# Patient Record
Sex: MALE | Race: WHITE | NOT HISPANIC OR LATINO | Employment: FULL TIME | URBAN - METROPOLITAN AREA
[De-identification: names, ages, dates, MRNs, and addresses within clinical notes are randomized per-mention and may not be internally consistent; named-entity substitution may affect disease eponyms.]

---

## 2020-11-22 ENCOUNTER — HOSPITAL ENCOUNTER (EMERGENCY)
Facility: HOSPITAL | Age: 35
Discharge: HOME | End: 2020-11-22
Attending: EMERGENCY MEDICINE | Admitting: EMERGENCY MEDICINE

## 2020-11-22 VITALS
RESPIRATION RATE: 16 BRPM | SYSTOLIC BLOOD PRESSURE: 112 MMHG | WEIGHT: 141.31 LBS | TEMPERATURE: 98.3 F | HEIGHT: 70 IN | HEART RATE: 76 BPM | BODY MASS INDEX: 20.23 KG/M2 | DIASTOLIC BLOOD PRESSURE: 73 MMHG | OXYGEN SATURATION: 100 %

## 2020-11-22 DIAGNOSIS — S91.209A AVULSION OF TOENAIL, INITIAL ENCOUNTER: Primary | ICD-10-CM

## 2020-11-22 PROCEDURE — 99283 EMERGENCY DEPT VISIT LOW MDM: CPT

## 2020-11-22 NOTE — DISCHARGE INSTRUCTIONS
Follow-up with podiatry as directed.  Return to the emergency room for any new or worsening symptoms or if you have any other questions or concerns.  Apply antibiotic ointment twice daily until healed.

## 2020-11-22 NOTE — ED NOTES
"Pt reports that he \"pulled\" his toenail off on Rt great toe yesterday and is concerned about the amount of bleeding.  Pt states that it has not stopped.  There is noted to be some surrounding redness to the rt great toe.  Oozing blood noted at the site.       Jordy Dumont, RN  11/22/20 3907    "

## 2020-11-22 NOTE — ED PROVIDER NOTES
"Subjective   Chief complaint: Right great toenail came off    35-year-old male presents stating his right great toenail came off yesterday.  He states he thinks he has a fungus on his toenails and the nail was only partially attached yesterday.  He states he felt like it needed to come off so he just pulled it off.  He states it has been bleeding slightly since that time.  He has had no fever.  He denies any other complaints.      History provided by:  Patient      Review of Systems   Constitutional: Negative for fever.   HENT: Negative for congestion.    Respiratory: Negative for cough and shortness of breath.    Cardiovascular: Negative for chest pain.       No past medical history on file.    No Known Allergies    No past surgical history on file.    No family history on file.    Social History     Socioeconomic History   • Marital status: Single     Spouse name: Not on file   • Number of children: Not on file   • Years of education: Not on file   • Highest education level: Not on file       /73   Pulse 76   Temp 98.3 °F (36.8 °C)   Resp 16   Ht 177.8 cm (70\")   Wt 64.1 kg (141 lb 5 oz)   SpO2 100%   BMI 20.28 kg/m²       Objective   Physical Exam  Vitals signs and nursing note reviewed.   Constitutional:       Appearance: Normal appearance.   HENT:      Head: Normocephalic.   Cardiovascular:      Rate and Rhythm: Normal rate and regular rhythm.   Pulmonary:      Effort: Pulmonary effort is normal. No respiratory distress.   Musculoskeletal:      Comments: Right great toenail has been removed.  Nailbed is somewhat frail-appearing but does not have any active bleeding.  There is no surrounding erythema or warmth.  There is no tenderness on palpation.   Skin:     General: Skin is warm and dry.   Neurological:      Mental Status: He is alert and oriented to person, place, and time.         Procedures           ED Course                                           MDM   Nailbed was cleaned and antibiotic " appointment and dressing were applied.  Patient will be given podiatry follow-up.      Final diagnoses:   Avulsion of toenail, initial encounter            William Beltran MD  11/22/20 4439

## 2020-11-24 ENCOUNTER — OFFICE VISIT (OUTPATIENT)
Dept: PODIATRY | Facility: CLINIC | Age: 35
End: 2020-11-24

## 2020-11-24 VITALS
WEIGHT: 147 LBS | DIASTOLIC BLOOD PRESSURE: 75 MMHG | HEART RATE: 57 BPM | BODY MASS INDEX: 21.05 KG/M2 | SYSTOLIC BLOOD PRESSURE: 117 MMHG | HEIGHT: 70 IN

## 2020-11-24 DIAGNOSIS — L60.1 ONYCHOLYSIS: Primary | ICD-10-CM

## 2020-11-24 DIAGNOSIS — B35.1 ONYCHOMYCOSIS: ICD-10-CM

## 2020-11-24 PROCEDURE — 99203 OFFICE O/P NEW LOW 30 MIN: CPT | Performed by: PODIATRIST

## 2020-11-24 NOTE — PATIENT INSTRUCTIONS
Fungal Nail Infection  A fungal nail infection is a common infection of the toenails or fingernails. This condition affects toenails more often than fingernails. It often affects the great, or big, toes. More than one nail may be infected. The condition can be passed from person to person (is contagious).  What are the causes?  This condition is caused by a fungus. Several types of fungi can cause the infection. These fungi are common in moist and warm areas. If your hands or feet come into contact with the fungus, it may get into a crack in your fingernail or toenail and cause the infection.  What increases the risk?  The following factors may make you more likely to develop this condition:  · Being male.  · Being of older age.  · Living with someone who has the fungus.  · Walking barefoot in areas where the fungus thrives, such as showers or locker rooms.  · Wearing shoes and socks that cause your feet to sweat.  · Having a nail injury or a recent nail surgery.  · Having certain medical conditions, such as:  ? Athlete's foot.  ? Diabetes.  ? Psoriasis.  ? Poor circulation.  ? A weak body defense system (immune system).  What are the signs or symptoms?  Symptoms of this condition include:  · A pale spot on the nail.  · Thickening of the nail.  · A nail that becomes yellow or brown.  · A brittle or ragged nail edge.  · A crumbling nail.  · A nail that has lifted away from the nail bed.  How is this diagnosed?  This condition is diagnosed with a physical exam. Your health care provider may take a scraping or clipping from your nail to test for the fungus.  How is this treated?  Treatment is not needed for mild infections. If you have significant nail changes, treatment may include:  · Antifungal medicines taken by mouth (orally). You may need to take the medicine for several weeks or several months, and you may not see the results for a long time. These medicines can cause side effects. Ask your health care provider  what problems to watch for.  · Antifungal nail polish or nail cream. These may be used along with oral antifungal medicines.  · Laser treatment of the nail.  · Surgery to remove the nail. This may be needed for the most severe infections.  It can take a long time, usually up to a year, for the infection to go away. The infection may also come back.  Follow these instructions at home:  Medicines  · Take or apply over-the-counter and prescription medicines only as told by your health care provider.  · Ask your health care provider about using over-the-counter mentholated ointment on your nails.  Nail care  · Trim your nails often.  · Wash and dry your hands and feet every day.  · Keep your feet dry:  ? Wear absorbent socks, and change your socks frequently.  ? Wear shoes that allow air to circulate, such as sandals or canvas tennis shoes. Throw out old shoes.  · Do not use artificial nails.  · If you go to a nail salon, make sure you choose one that uses clean instruments.  · Use antifungal foot powder on your feet and in your shoes.  General instructions  · Do not share personal items, such as towels or nail clippers.  · Do not walk barefoot in shower rooms or locker rooms.  · Wear rubber gloves if you are working with your hands in wet areas.  · Keep all follow-up visits as told by your health care provider. This is important.  Contact a health care provider if:  Your infection is not getting better or it is getting worse after several months.  Summary  · A fungal nail infection is a common infection of the toenails or fingernails.  · Treatment is not needed for mild infections. If you have significant nail changes, treatment may include taking medicine orally and applying medicine to your nails.  · It can take a long time, usually up to a year, for the infection to go away. The infection may also come back.  · Take or apply over-the-counter and prescription medicines only as told by your health care  provider.  · Follow instructions for taking care of your nails to help prevent infection from coming back or spreading.  This information is not intended to replace advice given to you by your health care provider. Make sure you discuss any questions you have with your health care provider.  Document Revised: 04/09/2020 Document Reviewed: 05/24/2019  Elsevier Patient Education © 2020 [employer]

## 2020-11-24 NOTE — PROGRESS NOTES
11/24/2020  Foot and Ankle Surgery - New Patient   Provider: Dr. Moi Sky DPM  Location: [facility]    Subjective:  Keagan Valenzuela is a 35 y.o. male.     Chief Complaint   Patient presents with   • Right Foot - Nail Problem       HPI: Patient is a 35-year-old male that presents with issues involving his right great toe.  He recently had some loosening to the toenail and remove the nail in its entirety.  He states that this occurred 2 days ago.  He reported to the ED and was referred to me.  Patient has an open wound to the nailbed region but denies any significant pain or limitation.  He has not noticed any deep tunneling wound or signs of infection.  He has had issues of discoloration and thickness involving his nails for several years.  He has tried topical and oral antifungal medications without improvement.  He denies any other pain or issues with his feet.    No Known Allergies    History reviewed. No pertinent past medical history.    History reviewed. No pertinent surgical history.    Family History   Problem Relation Age of Onset   • No Known Problems Mother    • No Known Problems Father        Social History     Socioeconomic History   • Marital status: Single     Spouse name: Not on file   • Number of children: Not on file   • Years of education: Not on file   • Highest education level: Not on file   Tobacco Use   • Smoking status: Current Every Day Smoker     Packs/day: 0.50     Types: Cigarettes   • Smokeless tobacco: Never Used   Substance and Sexual Activity   • Alcohol use: Yes     Comment: very little   • Drug use: Defer   • Sexual activity: Defer        No current outpatient medications on file prior to visit.     No current facility-administered medications on file prior to visit.        Review of Systems:  General: Denies fever, chills, fatigue, and weakness.  Eyes: Denies vision loss, blurry vision, and excessive redness.  ENT: Denies hearing issues and difficulty  "swallowing.  Cardiovascular: Denies palpitations, chest pain, or syncopal episodes.  Respiratory: Denies shortness of breath, wheezing, and coughing.  GI: Denies abdominal pain, nausea, and vomiting.   : Denies frequency, hematuria, and urgency.  Musculoskeletal: Denies muscle cramps, joint pains, and stiffness.  Derm: + Nail issues  Neuro: Denies headaches, numbness, loss of coordination, and tremors.  Psych: Denies anxiety and depression.  Endocrine: Denies temperature intolerance and changes in appetite.  Heme: Denies bleeding disorders or abnormal bruising.     Objective   /75   Pulse 57   Ht 177.8 cm (70\")   Wt 66.7 kg (147 lb)   BMI 21.09 kg/m²     Foot/Ankle Exam:       General:   Appearance: appears stated age and healthy    Orientation: AAOx3    Affect: appropriate    Gait: unimpaired      VASCULAR      Right Foot Vascularity   Normal vascular exam    Dorsalis pedis:  2+  Posterior tibial:  2+  Skin Temperature: warm    Edema Grading:  None  CFT:  < 3 seconds  Pedal Hair Growth:  Present  Varicosities: none       Left Foot Vascularity   Normal vascular exam    Dorsalis pedis:  2+  Posterior tibial:  2+  Skin Temperature: warm    Edema Grading:  None  CFT:  < 3 seconds  Pedal Hair Growth:  Present  Varicosities: none        NEUROLOGIC     Right Foot Neurologic   Light touch sensation:  Normal  Hot/Cold sensation: normal    Achilles reflex:  2+     Left Foot Neurologic   Light touch sensation:  Normal  Hot/cold sensation: normal    Achilles reflex:  2+     MUSCULOSKELETAL      Right Foot Musculoskeletal   Ecchymosis:  None  Tenderness: toe 1    Arch:  Normal     Left Foot Musculoskeletal   Ecchymosis:  None  Tenderness: none    Arch:  Normal     MUSCLE STRENGTH     Right Foot Muscle Strength   Normal strength    Foot dorsiflexion:  5  Foot plantar flexion:  5  Foot inversion:  5  Foot eversion:  5     Left Foot Muscle Strength   Normal strength    Foot dorsiflexion:  5  Foot plantar flexion:  " 5  Foot inversion:  5  Foot eversion:  5     RANGE OF MOTION      Right Foot Range of Motion   Foot and ankle ROM within normal limits       Left Foot Range of Motion   Foot and ankle ROM within normal limits       DERMATOLOGIC     Right Foot Dermatologic   Skin comment:  Open nailbed to the right great toe.  Granular base without signs of infection, debris, or necrosis.  Nails: onychomycosis, abnormally thick and dystrophic nails       Left Foot Dermatologic   Skin: skin intact    Nails: onychomycosis, abnormally thick and dystrophic nails        Right Foot Additional Comments No gross deformity or instability.  Mild discomfort with palpation to the right great toe.      Assessment/Plan   Diagnoses and all orders for this visit:    1. Onycholysis (Primary)    2. Onychomycosis      Patient presents with open nailbed to the right great toe after removing his own nail.  Patient denies any other issues today.  After exam, he does not have any gross signs of infection or other concerns.  I have recommended that he proceed with over-the-counter antibiotic ointment and a Band-Aid.  Patient may shower and bathe as needed.  He may proceed with normal daily activities and monitor closely.  I would like him to call with any progressive signs of infection.  I will see him in 2 to 3 weeks for reevaluation.     No orders of the defined types were placed in this encounter.       Note is dictated utilizing voice recognition software. Unfortunately this leads to occasional typographical errors. I apologize in advance if the situation occurs. If questions occur please do not hesitate to call our office.

## 2022-01-26 ENCOUNTER — TRANSCRIBE ORDERS (OUTPATIENT)
Dept: ADMINISTRATIVE | Facility: HOSPITAL | Age: 37
End: 2022-01-26

## 2022-01-26 ENCOUNTER — LAB (OUTPATIENT)
Dept: LAB | Facility: HOSPITAL | Age: 37
End: 2022-01-26

## 2022-01-26 DIAGNOSIS — Z20.822 EXPOSURE TO COVID-19 VIRUS: ICD-10-CM

## 2022-01-26 DIAGNOSIS — Z20.822 EXPOSURE TO COVID-19 VIRUS: Primary | ICD-10-CM

## 2022-01-26 PROCEDURE — C9803 HOPD COVID-19 SPEC COLLECT: HCPCS

## 2022-01-26 PROCEDURE — U0004 COV-19 TEST NON-CDC HGH THRU: HCPCS

## 2022-01-27 LAB — SARS-COV-2 ORF1AB RESP QL NAA+PROBE: DETECTED

## 2024-04-15 ENCOUNTER — HOSPITAL ENCOUNTER (EMERGENCY)
Facility: HOSPITAL | Age: 39
Discharge: HOME | End: 2024-04-15
Attending: EMERGENCY MEDICINE | Admitting: EMERGENCY MEDICINE
Payer: COMMERCIAL

## 2024-04-15 VITALS
HEART RATE: 68 BPM | HEIGHT: 70 IN | RESPIRATION RATE: 16 BRPM | WEIGHT: 127.87 LBS | BODY MASS INDEX: 18.31 KG/M2 | DIASTOLIC BLOOD PRESSURE: 84 MMHG | TEMPERATURE: 98.7 F | SYSTOLIC BLOOD PRESSURE: 118 MMHG | OXYGEN SATURATION: 97 %

## 2024-04-15 DIAGNOSIS — T44.3X5A: Primary | ICD-10-CM

## 2024-04-15 LAB
ALBUMIN SERPL-MCNC: 4.2 G/DL (ref 3.5–5.2)
ALBUMIN/GLOB SERPL: 1.6 G/DL
ALP SERPL-CCNC: 63 U/L (ref 39–117)
ALT SERPL W P-5'-P-CCNC: 11 U/L (ref 1–41)
AMPHET+METHAMPHET UR QL: NEGATIVE
ANION GAP SERPL CALCULATED.3IONS-SCNC: 11 MMOL/L (ref 5–15)
APAP SERPL-MCNC: <5 MCG/ML (ref 0–30)
AST SERPL-CCNC: 20 U/L (ref 1–40)
BACTERIA UR QL AUTO: ABNORMAL /HPF
BARBITURATES UR QL SCN: NEGATIVE
BASOPHILS # BLD AUTO: 0.01 10*3/MM3 (ref 0–0.2)
BASOPHILS NFR BLD AUTO: 0.1 % (ref 0–1.5)
BENZODIAZ UR QL SCN: NEGATIVE
BILIRUB SERPL-MCNC: 0.6 MG/DL (ref 0–1.2)
BILIRUB UR QL STRIP: NEGATIVE
BUN SERPL-MCNC: 9 MG/DL (ref 6–20)
BUN/CREAT SERPL: 7.1 (ref 7–25)
C TRACH RRNA CVX QL NAA+PROBE: NOT DETECTED
CALCIUM SPEC-SCNC: 9 MG/DL (ref 8.6–10.5)
CANNABINOIDS SERPL QL: NEGATIVE
CHLORIDE SERPL-SCNC: 96 MMOL/L (ref 98–107)
CK SERPL-CCNC: 158 U/L (ref 20–200)
CLARITY UR: CLEAR
CO2 SERPL-SCNC: 28 MMOL/L (ref 22–29)
COCAINE UR QL: NEGATIVE
COLOR UR: YELLOW
CREAT SERPL-MCNC: 1.27 MG/DL (ref 0.76–1.27)
DEPRECATED RDW RBC AUTO: 40.2 FL (ref 37–54)
EGFRCR SERPLBLD CKD-EPI 2021: 73.7 ML/MIN/1.73
EOSINOPHIL # BLD AUTO: 0.03 10*3/MM3 (ref 0–0.4)
EOSINOPHIL NFR BLD AUTO: 0.4 % (ref 0.3–6.2)
ERYTHROCYTE [DISTWIDTH] IN BLOOD BY AUTOMATED COUNT: 12.6 % (ref 12.3–15.4)
GLOBULIN UR ELPH-MCNC: 2.6 GM/DL
GLUCOSE SERPL-MCNC: 115 MG/DL (ref 65–99)
GLUCOSE UR STRIP-MCNC: NEGATIVE MG/DL
HCT VFR BLD AUTO: 39.9 % (ref 37.5–51)
HGB BLD-MCNC: 13.8 G/DL (ref 13–17.7)
HGB UR QL STRIP.AUTO: NEGATIVE
HYALINE CASTS UR QL AUTO: ABNORMAL /LPF
IMM GRANULOCYTES # BLD AUTO: 0.01 10*3/MM3 (ref 0–0.05)
IMM GRANULOCYTES NFR BLD AUTO: 0.1 % (ref 0–0.5)
KETONES UR QL STRIP: NEGATIVE
LEUKOCYTE ESTERASE UR QL STRIP.AUTO: ABNORMAL
LYMPHOCYTES # BLD AUTO: 1.41 10*3/MM3 (ref 0.7–3.1)
LYMPHOCYTES NFR BLD AUTO: 18.9 % (ref 19.6–45.3)
MCH RBC QN AUTO: 30.2 PG (ref 26.6–33)
MCHC RBC AUTO-ENTMCNC: 34.6 G/DL (ref 31.5–35.7)
MCV RBC AUTO: 87.3 FL (ref 79–97)
METHADONE UR QL SCN: NEGATIVE
MONOCYTES # BLD AUTO: 0.55 10*3/MM3 (ref 0.1–0.9)
MONOCYTES NFR BLD AUTO: 7.4 % (ref 5–12)
N GONORRHOEA RRNA SPEC QL NAA+PROBE: NOT DETECTED
NEUTROPHILS NFR BLD AUTO: 5.46 10*3/MM3 (ref 1.7–7)
NEUTROPHILS NFR BLD AUTO: 73.1 % (ref 42.7–76)
NITRITE UR QL STRIP: NEGATIVE
NRBC BLD AUTO-RTO: 0 /100 WBC (ref 0–0.2)
OPIATES UR QL: NEGATIVE
OXYCODONE UR QL SCN: NEGATIVE
PH UR STRIP.AUTO: 7 [PH] (ref 5–8)
PLATELET # BLD AUTO: 176 10*3/MM3 (ref 140–450)
PMV BLD AUTO: 10.8 FL (ref 6–12)
POTASSIUM SERPL-SCNC: 3.3 MMOL/L (ref 3.5–5.2)
PROT SERPL-MCNC: 6.8 G/DL (ref 6–8.5)
PROT UR QL STRIP: NEGATIVE
RBC # BLD AUTO: 4.57 10*6/MM3 (ref 4.14–5.8)
RBC # UR STRIP: ABNORMAL /HPF
REF LAB TEST METHOD: ABNORMAL
SALICYLATES SERPL-MCNC: <0.3 MG/DL
SODIUM SERPL-SCNC: 135 MMOL/L (ref 136–145)
SP GR UR STRIP: <=1.005 (ref 1–1.03)
SQUAMOUS #/AREA URNS HPF: ABNORMAL /HPF
T4 FREE SERPL-MCNC: 1.46 NG/DL (ref 0.93–1.7)
TSH SERPL DL<=0.05 MIU/L-ACNC: 1.85 UIU/ML (ref 0.27–4.2)
UROBILINOGEN UR QL STRIP: ABNORMAL
WBC # UR STRIP: ABNORMAL /HPF
WBC NRBC COR # BLD AUTO: 7.47 10*3/MM3 (ref 3.4–10.8)

## 2024-04-15 PROCEDURE — 80307 DRUG TEST PRSMV CHEM ANLYZR: CPT | Performed by: EMERGENCY MEDICINE

## 2024-04-15 PROCEDURE — 82550 ASSAY OF CK (CPK): CPT | Performed by: EMERGENCY MEDICINE

## 2024-04-15 PROCEDURE — 80050 GENERAL HEALTH PANEL: CPT | Performed by: EMERGENCY MEDICINE

## 2024-04-15 PROCEDURE — 80179 DRUG ASSAY SALICYLATE: CPT | Performed by: EMERGENCY MEDICINE

## 2024-04-15 PROCEDURE — 80143 DRUG ASSAY ACETAMINOPHEN: CPT | Performed by: EMERGENCY MEDICINE

## 2024-04-15 PROCEDURE — 25810000003 LACTATED RINGERS SOLUTION: Performed by: EMERGENCY MEDICINE

## 2024-04-15 PROCEDURE — 87491 CHLMYD TRACH DNA AMP PROBE: CPT | Performed by: EMERGENCY MEDICINE

## 2024-04-15 PROCEDURE — 87591 N.GONORRHOEAE DNA AMP PROB: CPT | Performed by: EMERGENCY MEDICINE

## 2024-04-15 PROCEDURE — 99283 EMERGENCY DEPT VISIT LOW MDM: CPT

## 2024-04-15 PROCEDURE — 84439 ASSAY OF FREE THYROXINE: CPT | Performed by: EMERGENCY MEDICINE

## 2024-04-15 PROCEDURE — 81001 URINALYSIS AUTO W/SCOPE: CPT | Performed by: EMERGENCY MEDICINE

## 2024-04-15 PROCEDURE — 93005 ELECTROCARDIOGRAM TRACING: CPT | Performed by: EMERGENCY MEDICINE

## 2024-04-15 RX ORDER — SODIUM CHLORIDE 0.9 % (FLUSH) 0.9 %
10 SYRINGE (ML) INJECTION AS NEEDED
Status: DISCONTINUED | OUTPATIENT
Start: 2024-04-15 | End: 2024-04-15 | Stop reason: HOSPADM

## 2024-04-15 RX ORDER — POTASSIUM CHLORIDE 20 MEQ/1
40 TABLET, EXTENDED RELEASE ORAL ONCE
Status: COMPLETED | OUTPATIENT
Start: 2024-04-15 | End: 2024-04-15

## 2024-04-15 RX ADMIN — SODIUM CHLORIDE, POTASSIUM CHLORIDE, SODIUM LACTATE AND CALCIUM CHLORIDE 1000 ML: 600; 310; 30; 20 INJECTION, SOLUTION INTRAVENOUS at 08:31

## 2024-04-15 RX ADMIN — POTASSIUM CHLORIDE 40 MEQ: 1500 TABLET, EXTENDED RELEASE ORAL at 10:21

## 2024-04-15 NOTE — ED NOTES
Patient came in because he drank a half bottle of zquil, pupils dilated. Said he did not want to harm himself. He just wanted to sleep.,

## 2024-04-15 NOTE — ED PROVIDER NOTES
Subjective   History of Present Illness  Chief complaint blurring of vision status post drinking half a bottle of ZzzQuil    History of present illness this is a 38-year-old male could not sleep last night took a half a bottle of ZzzQuil before he went to bed the patient complains of some blurring of his vision this morning.  He denies any headache he denies any loss of vision chest pain shortness of breath cough congestion.  States he is not suicidal or homicidal no other ingestion just want to go to sleep.  Patient has no other complaints or ingestions at this time.  He also thinks his genitalia do not look right.  No pain to his genitalia no discharge or sores or lesions but he thinks it looks smaller than usual.      Review of Systems   Constitutional:  Negative for chills and fever.   Eyes:  Positive for visual disturbance.   Respiratory:  Negative for chest tightness and shortness of breath.    Cardiovascular:  Negative for chest pain and palpitations.   Gastrointestinal:  Negative for abdominal pain and vomiting.   Genitourinary:  Negative for difficulty urinating.   Skin:  Negative for pallor and rash.   Neurological:  Positive for dizziness and light-headedness. Negative for facial asymmetry, speech difficulty and weakness.   Psychiatric/Behavioral:  Negative for confusion.        No past medical history on file.    No Known Allergies    No past surgical history on file.    Family History   Problem Relation Age of Onset    No Known Problems Mother     No Known Problems Father        Social History     Socioeconomic History    Marital status: Single   Tobacco Use    Smoking status: Every Day     Current packs/day: 0.50     Types: Cigarettes    Smokeless tobacco: Never   Substance and Sexual Activity    Alcohol use: Yes     Comment: very little    Drug use: Defer    Sexual activity: Defer     Prior to Admission medications    Not on File   No routine medication      Objective   Physical Exam  Constitutional  this is a 38-year-old male awake alert no distress triage vital signs reviewed HEENT extraocular muscles are intact pupils equal round react there is no papilledema pupils slightly dilated mouth clear but dry neck supple no adenopathy no meningeal signs lungs clear no retraction heart regular without murmur abdomen soft nontender good bowel sounds no peritoneal findings or pulsatile masses extremities no edema cords or Homans' sign or evidence of DVT skin warm dry without rashes neurologic awake alert orientated x 4 no facial asymmetry speech normal he is able to walk without difficulty no ataxia.   examination normal external genitalia no discharge lesions sores no inguinal adenopathy no pain to the testicles no swelling normal cremaster reflex no redness warmth edema no crepitus subcutaneous air no signs of foreigners gangrene or testicular torsion normal lie normal cremaster reflex.  No discharge  Procedures           ED Course      Results for orders placed or performed during the hospital encounter of 04/15/24   Chlamydia trachomatis, Neisseria gonorrhoeae, PCR - Urine, Urine, Clean Catch    Specimen: Urine, Clean Catch   Result Value Ref Range    Chlamydia DNA by PCR Not Detected Not Detected, Invalid    Neisseria gonorrhoeae by PCR Not Detected Not Detected   Comprehensive Metabolic Panel    Specimen: Blood   Result Value Ref Range    Glucose 115 (H) 65 - 99 mg/dL    BUN 9 6 - 20 mg/dL    Creatinine 1.27 0.76 - 1.27 mg/dL    Sodium 135 (L) 136 - 145 mmol/L    Potassium 3.3 (L) 3.5 - 5.2 mmol/L    Chloride 96 (L) 98 - 107 mmol/L    CO2 28.0 22.0 - 29.0 mmol/L    Calcium 9.0 8.6 - 10.5 mg/dL    Total Protein 6.8 6.0 - 8.5 g/dL    Albumin 4.2 3.5 - 5.2 g/dL    ALT (SGPT) 11 1 - 41 U/L    AST (SGOT) 20 1 - 40 U/L    Alkaline Phosphatase 63 39 - 117 U/L    Total Bilirubin 0.6 0.0 - 1.2 mg/dL    Globulin 2.6 gm/dL    A/G Ratio 1.6 g/dL    BUN/Creatinine Ratio 7.1 7.0 - 25.0    Anion Gap 11.0 5.0 - 15.0 mmol/L     eGFR 73.7 >60.0 mL/min/1.73   Salicylate Level    Specimen: Blood   Result Value Ref Range    Salicylate <0.3 <=30.0 mg/dL   Acetaminophen Level    Specimen: Blood   Result Value Ref Range    Acetaminophen <5.0 0.0 - 30.0 mcg/mL   Urine Drug Screen - Urine, Clean Catch    Specimen: Urine, Clean Catch   Result Value Ref Range    Amphet/Methamphet, Screen Negative Negative    Barbiturates Screen, Urine Negative Negative    Benzodiazepine Screen, Urine Negative Negative    Cocaine Screen, Urine Negative Negative    Opiate Screen Negative Negative    THC, Screen, Urine Negative Negative    Methadone Screen, Urine Negative Negative    Oxycodone Screen, Urine Negative Negative   CK    Specimen: Blood   Result Value Ref Range    Creatine Kinase 158 20 - 200 U/L   T4, Free    Specimen: Blood   Result Value Ref Range    Free T4 1.46 0.93 - 1.70 ng/dL   TSH    Specimen: Blood   Result Value Ref Range    TSH 1.850 0.270 - 4.200 uIU/mL   Urinalysis With Microscopic If Indicated (No Culture) - Urine, Clean Catch    Specimen: Urine, Clean Catch   Result Value Ref Range    Color, UA Yellow Yellow, Straw    Appearance, UA Clear Clear    pH, UA 7.0 5.0 - 8.0    Specific Gravity, UA <=1.005 1.005 - 1.030    Glucose, UA Negative Negative    Ketones, UA Negative Negative    Bilirubin, UA Negative Negative    Blood, UA Negative Negative    Protein, UA Negative Negative    Leuk Esterase, UA Trace (A) Negative    Nitrite, UA Negative Negative    Urobilinogen, UA 0.2 E.U./dL 0.2 - 1.0 E.U./dL   CBC Auto Differential    Specimen: Blood   Result Value Ref Range    WBC 7.47 3.40 - 10.80 10*3/mm3    RBC 4.57 4.14 - 5.80 10*6/mm3    Hemoglobin 13.8 13.0 - 17.7 g/dL    Hematocrit 39.9 37.5 - 51.0 %    MCV 87.3 79.0 - 97.0 fL    MCH 30.2 26.6 - 33.0 pg    MCHC 34.6 31.5 - 35.7 g/dL    RDW 12.6 12.3 - 15.4 %    RDW-SD 40.2 37.0 - 54.0 fl    MPV 10.8 6.0 - 12.0 fL    Platelets 176 140 - 450 10*3/mm3    Neutrophil % 73.1 42.7 - 76.0 %    Lymphocyte  % 18.9 (L) 19.6 - 45.3 %    Monocyte % 7.4 5.0 - 12.0 %    Eosinophil % 0.4 0.3 - 6.2 %    Basophil % 0.1 0.0 - 1.5 %    Immature Grans % 0.1 0.0 - 0.5 %    Neutrophils, Absolute 5.46 1.70 - 7.00 10*3/mm3    Lymphocytes, Absolute 1.41 0.70 - 3.10 10*3/mm3    Monocytes, Absolute 0.55 0.10 - 0.90 10*3/mm3    Eosinophils, Absolute 0.03 0.00 - 0.40 10*3/mm3    Basophils, Absolute 0.01 0.00 - 0.20 10*3/mm3    Immature Grans, Absolute 0.01 0.00 - 0.05 10*3/mm3    nRBC 0.0 0.0 - 0.2 /100 WBC   Urinalysis, Microscopic Only - Urine, Clean Catch    Specimen: Urine, Clean Catch   Result Value Ref Range    RBC, UA None Seen None Seen, 0-2 /HPF    WBC, UA 0-2 None Seen, 0-2 /HPF    Bacteria, UA Trace (A) None Seen /HPF    Squamous Epithelial Cells, UA None Seen None Seen, 0-2 /HPF    Hyaline Casts, UA None Seen None Seen /LPF    Methodology Manual Light Microscopy    ECG 12 Lead Altered Mental Status   Result Value Ref Range    QT Interval 403 ms    QTC Interval 411 ms     No radiology results for the last day  Medications   sodium chloride 0.9 % flush 10 mL (has no administration in time range)   lactated ringers bolus 1,000 mL (1,000 mL Intravenous New Bag 4/15/24 0831)   potassium chloride (KLOR-CON M20) CR tablet 40 mEq (40 mEq Oral Given 4/15/24 1021)                                       EKG my interpretation normal sinus rhythm rate 63 normal axis hypertrophy QTc of 411 normal EKG.  Visit acuity with his correction 20/70 left 20/40 on the right bilateral 20/40       Medical Decision Making  Medical decision making.  Patient had IV established patient was given a liter of lactated Ringer's.  He had an EKG my interpretation normal sinus rhythm normal axis hypertrophy rate of 63 QTc 411 was normal EKG visual acuity with his contacts was 2070 left 20/40 the right and 4040 out of both eyes.  Labs obtained my independent review gonorrhea chlamydia normal.  Comprehensive metabolic profile unremarkable and the sodium 135  potassium 3.3.  Tylenol aspirin negative drug screen negative CK normal TSH T4 normal urine normal.  CBC unremarkable.  Please control would also been consulted as well and just recommended the EKG and no further treatment at this time.  Patient given potassium 40 mill equivalents p.o.  He is up awake he is alert and orientated x 4 no facial asymmetry speech normal he is walking without difficulty.  I do not see evidence of an acute stroke meningitis encephalitis he denies any injury no evidence of intracerebral hemorrhage or sentinel bleeding cardiac issue dysrhythmia prolonged QT patient is not suicidal or homicidal.  At this point I suspect he is suffering ill effects of drinking half a bottle of ZzzQuil from anticholinergic effects we did talk about this he voices understanding he was discharged home for outpatient management I do not see evidence testicular torsion or any type of genitalia issue at this time.    Problems Addressed:  Adverse effect of anticholinergic: complicated acute illness or injury    Amount and/or Complexity of Data Reviewed  Labs: ordered. Decision-making details documented in ED Course.  ECG/medicine tests: ordered and independent interpretation performed. Decision-making details documented in ED Course.        Final diagnoses:   Adverse effect of anticholinergic       ED Disposition  ED Disposition       ED Disposition   Discharge    Condition   Stable    Comment   --               Keri Shrestha, APRN  [address] [facility], [place] [address] [phone]    In 1 day           Medication List      No changes were made to your prescriptions during this visit.            Chandrakant Blas MD  04/17/24 2458

## 2024-04-15 NOTE — DISCHARGE INSTRUCTIONS
Rest today plenty of fluids  No more Benadryl ZzzQuil or any other antihistamine or decongestant or over-the-counter cold medicine.  Return for altered mental status vomiting loss of vision paralysis unable to eat drink talk walk or function normally or any other new or worse problems or concerns return immediately ER.

## 2024-04-16 LAB
QT INTERVAL: 403 MS
QTC INTERVAL: 411 MS

## 2024-04-17 ENCOUNTER — APPOINTMENT (OUTPATIENT)
Dept: GENERAL RADIOLOGY | Facility: HOSPITAL | Age: 39
DRG: 918 | End: 2024-04-17
Payer: COMMERCIAL

## 2024-04-17 ENCOUNTER — APPOINTMENT (OUTPATIENT)
Dept: CT IMAGING | Facility: HOSPITAL | Age: 39
DRG: 918 | End: 2024-04-17
Payer: COMMERCIAL

## 2024-04-17 ENCOUNTER — HOSPITAL ENCOUNTER (INPATIENT)
Facility: HOSPITAL | Age: 39
LOS: 5 days | Discharge: HOME | DRG: 918 | End: 2024-04-22
Attending: INTERNAL MEDICINE | Admitting: INTERNAL MEDICINE
Payer: COMMERCIAL

## 2024-04-17 DIAGNOSIS — K92.0 HEMATEMESIS, UNSPECIFIED WHETHER NAUSEA PRESENT: ICD-10-CM

## 2024-04-17 DIAGNOSIS — T44.3X4A ANTICHOLINERGIC DRUG OVERDOSE, UNDETERMINED INTENT, INITIAL ENCOUNTER: ICD-10-CM

## 2024-04-17 DIAGNOSIS — M62.82 NON-TRAUMATIC RHABDOMYOLYSIS: ICD-10-CM

## 2024-04-17 DIAGNOSIS — R41.82 ALTERED MENTAL STATUS, UNSPECIFIED ALTERED MENTAL STATUS TYPE: Primary | ICD-10-CM

## 2024-04-17 PROBLEM — T50.901A OVERDOSE: Status: ACTIVE | Noted: 2024-04-17

## 2024-04-17 LAB
ALBUMIN SERPL-MCNC: 3.7 G/DL (ref 3.5–5.2)
ALBUMIN SERPL-MCNC: 4.7 G/DL (ref 3.5–5.2)
ALBUMIN/GLOB SERPL: 1.9 G/DL
ALBUMIN/GLOB SERPL: 2 G/DL
ALP SERPL-CCNC: 63 U/L (ref 39–117)
ALP SERPL-CCNC: 79 U/L (ref 39–117)
ALT SERPL W P-5'-P-CCNC: 31 U/L (ref 1–41)
ALT SERPL W P-5'-P-CCNC: 51 U/L (ref 1–41)
AMPHET+METHAMPHET UR QL: NEGATIVE
ANION GAP SERPL CALCULATED.3IONS-SCNC: 11 MMOL/L (ref 5–15)
ANION GAP SERPL CALCULATED.3IONS-SCNC: 16 MMOL/L (ref 5–15)
AST SERPL-CCNC: 122 U/L (ref 1–40)
AST SERPL-CCNC: 273 U/L (ref 1–40)
ATMOSPHERIC PRESS: ABNORMAL MM[HG]
BACTERIA UR QL AUTO: ABNORMAL /HPF
BARBITURATES UR QL SCN: NEGATIVE
BASE EXCESS BLDV CALC-SCNC: -3.7 MMOL/L (ref -2–2)
BASOPHILS # BLD AUTO: 0.01 10*3/MM3 (ref 0–0.2)
BASOPHILS # BLD AUTO: 0.01 10*3/MM3 (ref 0–0.2)
BASOPHILS NFR BLD AUTO: 0.1 % (ref 0–1.5)
BASOPHILS NFR BLD AUTO: 0.1 % (ref 0–1.5)
BDY SITE: ABNORMAL
BENZODIAZ UR QL SCN: NEGATIVE
BILIRUB SERPL-MCNC: 0.6 MG/DL (ref 0–1.2)
BILIRUB SERPL-MCNC: 0.6 MG/DL (ref 0–1.2)
BILIRUB UR QL STRIP: NEGATIVE
BUN SERPL-MCNC: 10 MG/DL (ref 6–20)
BUN SERPL-MCNC: 8 MG/DL (ref 6–20)
BUN/CREAT SERPL: 6.5 (ref 7–25)
BUN/CREAT SERPL: 8.3 (ref 7–25)
CA-I BLDA-SCNC: 1.16 MMOL/L (ref 1.15–1.33)
CALCIUM SPEC-SCNC: 10 MG/DL (ref 8.6–10.5)
CALCIUM SPEC-SCNC: 8.7 MG/DL (ref 8.6–10.5)
CANNABINOIDS SERPL QL: NEGATIVE
CHLORIDE SERPL-SCNC: 106 MMOL/L (ref 98–107)
CHLORIDE SERPL-SCNC: 99 MMOL/L (ref 98–107)
CK SERPL-CCNC: ABNORMAL U/L (ref 20–200)
CLARITY UR: CLEAR
CO2 SERPL-SCNC: 23 MMOL/L (ref 22–29)
CO2 SERPL-SCNC: 25 MMOL/L (ref 22–29)
COCAINE UR QL: NEGATIVE
COLOR UR: YELLOW
CREAT BLDA-MCNC: 1.16 MG/DL (ref 0.6–1.3)
CREAT SERPL-MCNC: 1.2 MG/DL (ref 0.76–1.27)
CREAT SERPL-MCNC: 1.23 MG/DL (ref 0.76–1.27)
D-LACTATE SERPL-SCNC: 1 MMOL/L (ref 0.5–2)
D-LACTATE SERPL-SCNC: 7.1 MMOL/L (ref 0.2–2)
DEPRECATED RDW RBC AUTO: 38.2 FL (ref 37–54)
DEPRECATED RDW RBC AUTO: 41.1 FL (ref 37–54)
EGFRCR SERPLBLD CKD-EPI 2021: 76.6 ML/MIN/1.73
EGFRCR SERPLBLD CKD-EPI 2021: 78.9 ML/MIN/1.73
EGFRCR SERPLBLD CKD-EPI 2021: 82.2 ML/MIN/1.73
EOSINOPHIL # BLD AUTO: 0.01 10*3/MM3 (ref 0–0.4)
EOSINOPHIL # BLD AUTO: 0.01 10*3/MM3 (ref 0–0.4)
EOSINOPHIL NFR BLD AUTO: 0.1 % (ref 0.3–6.2)
EOSINOPHIL NFR BLD AUTO: 0.1 % (ref 0.3–6.2)
ERYTHROCYTE [DISTWIDTH] IN BLOOD BY AUTOMATED COUNT: 12.5 % (ref 12.3–15.4)
ERYTHROCYTE [DISTWIDTH] IN BLOOD BY AUTOMATED COUNT: 12.9 % (ref 12.3–15.4)
ETHANOL UR QL: <0.01 %
FINE GRAN CASTS URNS QL MICRO: ABNORMAL /LPF
GLOBULIN UR ELPH-MCNC: 2 GM/DL
GLOBULIN UR ELPH-MCNC: 2.4 GM/DL
GLUCOSE BLDC GLUCOMTR-MCNC: 100 MG/DL (ref 70–105)
GLUCOSE BLDC GLUCOMTR-MCNC: 107 MG/DL (ref 74–100)
GLUCOSE BLDC GLUCOMTR-MCNC: 107 MG/DL (ref 74–100)
GLUCOSE SERPL-MCNC: 85 MG/DL (ref 65–99)
GLUCOSE SERPL-MCNC: 92 MG/DL (ref 65–99)
GLUCOSE UR STRIP-MCNC: NEGATIVE MG/DL
HCO3 BLDV-SCNC: 19.9 MMOL/L (ref 22–26)
HCT VFR BLD AUTO: 37.5 % (ref 37.5–51)
HCT VFR BLD AUTO: 40.9 % (ref 37.5–51)
HCT VFR BLDA CALC: 41 % (ref 38–51)
HGB BLD-MCNC: 12.8 G/DL (ref 13–17.7)
HGB BLD-MCNC: 14.7 G/DL (ref 13–17.7)
HGB BLDA-MCNC: 14 G/DL (ref 12–17)
HGB UR QL STRIP.AUTO: ABNORMAL
HOLD SPECIMEN: NORMAL
HOLD SPECIMEN: NORMAL
HYALINE CASTS UR QL AUTO: ABNORMAL /LPF
IMM GRANULOCYTES # BLD AUTO: 0.03 10*3/MM3 (ref 0–0.05)
IMM GRANULOCYTES # BLD AUTO: 0.05 10*3/MM3 (ref 0–0.05)
IMM GRANULOCYTES NFR BLD AUTO: 0.3 % (ref 0–0.5)
IMM GRANULOCYTES NFR BLD AUTO: 0.4 % (ref 0–0.5)
INHALED O2 CONCENTRATION: 21 %
KETONES UR QL STRIP: NEGATIVE
LEUKOCYTE ESTERASE UR QL STRIP.AUTO: NEGATIVE
LYMPHOCYTES # BLD AUTO: 1.13 10*3/MM3 (ref 0.7–3.1)
LYMPHOCYTES # BLD AUTO: 1.49 10*3/MM3 (ref 0.7–3.1)
LYMPHOCYTES NFR BLD AUTO: 11.2 % (ref 19.6–45.3)
LYMPHOCYTES NFR BLD AUTO: 11.8 % (ref 19.6–45.3)
MCH RBC QN AUTO: 29.8 PG (ref 26.6–33)
MCH RBC QN AUTO: 30.4 PG (ref 26.6–33)
MCHC RBC AUTO-ENTMCNC: 34.1 G/DL (ref 31.5–35.7)
MCHC RBC AUTO-ENTMCNC: 35.9 G/DL (ref 31.5–35.7)
MCV RBC AUTO: 84.5 FL (ref 79–97)
MCV RBC AUTO: 87.4 FL (ref 79–97)
METHADONE UR QL SCN: NEGATIVE
MODALITY: ABNORMAL
MONOCYTES # BLD AUTO: 0.44 10*3/MM3 (ref 0.1–0.9)
MONOCYTES # BLD AUTO: 0.6 10*3/MM3 (ref 0.1–0.9)
MONOCYTES NFR BLD AUTO: 4.5 % (ref 5–12)
MONOCYTES NFR BLD AUTO: 4.6 % (ref 5–12)
NEUTROPHILS NFR BLD AUTO: 11.15 10*3/MM3 (ref 1.7–7)
NEUTROPHILS NFR BLD AUTO: 7.98 10*3/MM3 (ref 1.7–7)
NEUTROPHILS NFR BLD AUTO: 83.1 % (ref 42.7–76)
NEUTROPHILS NFR BLD AUTO: 83.7 % (ref 42.7–76)
NITRITE UR QL STRIP: NEGATIVE
NRBC BLD AUTO-RTO: 0 /100 WBC (ref 0–0.2)
NRBC BLD AUTO-RTO: 0 /100 WBC (ref 0–0.2)
OPIATES UR QL: NEGATIVE
OXYCODONE UR QL SCN: NEGATIVE
PCO2 BLDV: 31.5 MM HG (ref 42–51)
PH BLDV: 7.41 PH UNITS (ref 7.32–7.43)
PH UR STRIP.AUTO: 5.5 [PH] (ref 5–8)
PLATELET # BLD AUTO: 164 10*3/MM3 (ref 140–450)
PLATELET # BLD AUTO: 213 10*3/MM3 (ref 140–450)
PMV BLD AUTO: 11 FL (ref 6–12)
PMV BLD AUTO: 11.6 FL (ref 6–12)
PO2 BLDV: 87.3 MM HG (ref 40–42)
POTASSIUM BLDA-SCNC: 3.9 MMOL/L (ref 3.5–4.5)
POTASSIUM SERPL-SCNC: 3.4 MMOL/L (ref 3.5–5.2)
POTASSIUM SERPL-SCNC: 4.3 MMOL/L (ref 3.5–5.2)
PROT SERPL-MCNC: 5.7 G/DL (ref 6–8.5)
PROT SERPL-MCNC: 7.1 G/DL (ref 6–8.5)
PROT UR QL STRIP: ABNORMAL
RBC # BLD AUTO: 4.29 10*6/MM3 (ref 4.14–5.8)
RBC # BLD AUTO: 4.84 10*6/MM3 (ref 4.14–5.8)
RBC # UR STRIP: ABNORMAL /HPF
REF LAB TEST METHOD: ABNORMAL
SAO2 % BLDCOV: 96.9 % (ref 45–75)
SODIUM BLD-SCNC: 140 MMOL/L (ref 138–146)
SODIUM SERPL-SCNC: 140 MMOL/L (ref 136–145)
SODIUM SERPL-SCNC: 140 MMOL/L (ref 136–145)
SP GR UR STRIP: 1.02 (ref 1–1.03)
SQUAMOUS #/AREA URNS HPF: ABNORMAL /HPF
UROBILINOGEN UR QL STRIP: ABNORMAL
WBC # UR STRIP: ABNORMAL /HPF
WBC CLUMPS # UR AUTO: ABNORMAL /HPF
WBC NRBC COR # BLD AUTO: 13.31 10*3/MM3 (ref 3.4–10.8)
WBC NRBC COR # BLD AUTO: 9.6 10*3/MM3 (ref 3.4–10.8)
WHOLE BLOOD HOLD COAG: NORMAL
WHOLE BLOOD HOLD SPECIMEN: NORMAL

## 2024-04-17 PROCEDURE — 82550 ASSAY OF CK (CPK): CPT | Performed by: PHYSICIAN ASSISTANT

## 2024-04-17 PROCEDURE — 93005 ELECTROCARDIOGRAM TRACING: CPT

## 2024-04-17 PROCEDURE — 25810000003 SODIUM CHLORIDE 0.9 % SOLUTION: Performed by: PHYSICIAN ASSISTANT

## 2024-04-17 PROCEDURE — 25810000003 SODIUM CHLORIDE 0.9 % SOLUTION: Performed by: NURSE PRACTITIONER

## 2024-04-17 PROCEDURE — 85018 HEMOGLOBIN: CPT

## 2024-04-17 PROCEDURE — 82077 ASSAY SPEC XCP UR&BREATH IA: CPT | Performed by: PHYSICIAN ASSISTANT

## 2024-04-17 PROCEDURE — 25010000002 LORAZEPAM PER 2 MG

## 2024-04-17 PROCEDURE — 85025 COMPLETE CBC W/AUTO DIFF WBC: CPT | Performed by: PHYSICIAN ASSISTANT

## 2024-04-17 PROCEDURE — 25010000002 LORAZEPAM PER 2 MG: Performed by: STUDENT IN AN ORGANIZED HEALTH CARE EDUCATION/TRAINING PROGRAM

## 2024-04-17 PROCEDURE — 25010000002 LORAZEPAM PER 2 MG: Performed by: INTERNAL MEDICINE

## 2024-04-17 PROCEDURE — 80307 DRUG TEST PRSMV CHEM ANLYZR: CPT | Performed by: PHYSICIAN ASSISTANT

## 2024-04-17 PROCEDURE — 83605 ASSAY OF LACTIC ACID: CPT

## 2024-04-17 PROCEDURE — 82565 ASSAY OF CREATININE: CPT

## 2024-04-17 PROCEDURE — 81001 URINALYSIS AUTO W/SCOPE: CPT | Performed by: PHYSICIAN ASSISTANT

## 2024-04-17 PROCEDURE — 25510000001 IOPAMIDOL PER 1 ML: Performed by: PHYSICIAN ASSISTANT

## 2024-04-17 PROCEDURE — 25010000002 LORAZEPAM PER 2 MG: Performed by: NURSE PRACTITIONER

## 2024-04-17 PROCEDURE — 82552 ASSAY OF CPK IN BLOOD: CPT | Performed by: STUDENT IN AN ORGANIZED HEALTH CARE EDUCATION/TRAINING PROGRAM

## 2024-04-17 PROCEDURE — 80179 DRUG ASSAY SALICYLATE: CPT | Performed by: STUDENT IN AN ORGANIZED HEALTH CARE EDUCATION/TRAINING PROGRAM

## 2024-04-17 PROCEDURE — 70450 CT HEAD/BRAIN W/O DYE: CPT

## 2024-04-17 PROCEDURE — 80051 ELECTROLYTE PANEL: CPT

## 2024-04-17 PROCEDURE — 74177 CT ABD & PELVIS W/CONTRAST: CPT

## 2024-04-17 PROCEDURE — 82550 ASSAY OF CK (CPK): CPT | Performed by: STUDENT IN AN ORGANIZED HEALTH CARE EDUCATION/TRAINING PROGRAM

## 2024-04-17 PROCEDURE — 80053 COMPREHEN METABOLIC PANEL: CPT | Performed by: NURSE PRACTITIONER

## 2024-04-17 PROCEDURE — 36415 COLL VENOUS BLD VENIPUNCTURE: CPT

## 2024-04-17 PROCEDURE — 93010 ELECTROCARDIOGRAM REPORT: CPT | Performed by: INTERNAL MEDICINE

## 2024-04-17 PROCEDURE — 25010000002 CEFTRIAXONE PER 250 MG: Performed by: INTERNAL MEDICINE

## 2024-04-17 PROCEDURE — 82948 REAGENT STRIP/BLOOD GLUCOSE: CPT

## 2024-04-17 PROCEDURE — 80053 COMPREHEN METABOLIC PANEL: CPT | Performed by: PHYSICIAN ASSISTANT

## 2024-04-17 PROCEDURE — 82803 BLOOD GASES ANY COMBINATION: CPT

## 2024-04-17 PROCEDURE — 82330 ASSAY OF CALCIUM: CPT

## 2024-04-17 PROCEDURE — 25810000003 SODIUM CHLORIDE 0.9 % SOLUTION: Performed by: STUDENT IN AN ORGANIZED HEALTH CARE EDUCATION/TRAINING PROGRAM

## 2024-04-17 PROCEDURE — 99285 EMERGENCY DEPT VISIT HI MDM: CPT

## 2024-04-17 PROCEDURE — 25010000002 HEPARIN (PORCINE) PER 1000 UNITS: Performed by: INTERNAL MEDICINE

## 2024-04-17 PROCEDURE — 87040 BLOOD CULTURE FOR BACTERIA: CPT | Performed by: INTERNAL MEDICINE

## 2024-04-17 PROCEDURE — 85025 COMPLETE CBC W/AUTO DIFF WBC: CPT | Performed by: NURSE PRACTITIONER

## 2024-04-17 PROCEDURE — 80143 DRUG ASSAY ACETAMINOPHEN: CPT | Performed by: STUDENT IN AN ORGANIZED HEALTH CARE EDUCATION/TRAINING PROGRAM

## 2024-04-17 PROCEDURE — 93005 ELECTROCARDIOGRAM TRACING: CPT | Performed by: STUDENT IN AN ORGANIZED HEALTH CARE EDUCATION/TRAINING PROGRAM

## 2024-04-17 PROCEDURE — 71045 X-RAY EXAM CHEST 1 VIEW: CPT

## 2024-04-17 RX ORDER — AMOXICILLIN 250 MG
2 CAPSULE ORAL 2 TIMES DAILY PRN
Status: DISCONTINUED | OUTPATIENT
Start: 2024-04-17 | End: 2024-04-22 | Stop reason: HOSPADM

## 2024-04-17 RX ORDER — LORAZEPAM 2 MG/ML
4 INJECTION INTRAMUSCULAR ONCE
Status: COMPLETED | OUTPATIENT
Start: 2024-04-17 | End: 2024-04-17

## 2024-04-17 RX ORDER — SODIUM CHLORIDE 9 MG/ML
150 INJECTION, SOLUTION INTRAVENOUS CONTINUOUS
Status: DISCONTINUED | OUTPATIENT
Start: 2024-04-17 | End: 2024-04-18

## 2024-04-17 RX ORDER — ONDANSETRON 4 MG/1
4 TABLET, ORALLY DISINTEGRATING ORAL EVERY 6 HOURS PRN
Status: DISCONTINUED | OUTPATIENT
Start: 2024-04-17 | End: 2024-04-22 | Stop reason: HOSPADM

## 2024-04-17 RX ORDER — POLYETHYLENE GLYCOL 3350 17 G/17G
17 POWDER, FOR SOLUTION ORAL DAILY PRN
Status: DISCONTINUED | OUTPATIENT
Start: 2024-04-17 | End: 2024-04-22 | Stop reason: HOSPADM

## 2024-04-17 RX ORDER — LORAZEPAM 2 MG/ML
INJECTION INTRAMUSCULAR
Status: COMPLETED
Start: 2024-04-17 | End: 2024-04-17

## 2024-04-17 RX ORDER — SODIUM CHLORIDE 0.9 % (FLUSH) 0.9 %
10 SYRINGE (ML) INJECTION AS NEEDED
Status: DISCONTINUED | OUTPATIENT
Start: 2024-04-17 | End: 2024-04-22 | Stop reason: HOSPADM

## 2024-04-17 RX ORDER — PANTOPRAZOLE SODIUM 40 MG/10ML
40 INJECTION, POWDER, LYOPHILIZED, FOR SOLUTION INTRAVENOUS EVERY 12 HOURS
Status: DISCONTINUED | OUTPATIENT
Start: 2024-04-18 | End: 2024-04-22 | Stop reason: HOSPADM

## 2024-04-17 RX ORDER — SODIUM CHLORIDE 9 MG/ML
40 INJECTION, SOLUTION INTRAVENOUS AS NEEDED
Status: DISCONTINUED | OUTPATIENT
Start: 2024-04-17 | End: 2024-04-22 | Stop reason: HOSPADM

## 2024-04-17 RX ORDER — SODIUM CHLORIDE 0.9 % (FLUSH) 0.9 %
10 SYRINGE (ML) INJECTION EVERY 12 HOURS SCHEDULED
Status: DISCONTINUED | OUTPATIENT
Start: 2024-04-17 | End: 2024-04-22 | Stop reason: HOSPADM

## 2024-04-17 RX ORDER — LORAZEPAM 2 MG/ML
4 INJECTION INTRAMUSCULAR
Status: DISCONTINUED | OUTPATIENT
Start: 2024-04-17 | End: 2024-04-22 | Stop reason: HOSPADM

## 2024-04-17 RX ORDER — ONDANSETRON 2 MG/ML
4 INJECTION INTRAMUSCULAR; INTRAVENOUS EVERY 6 HOURS PRN
Status: DISCONTINUED | OUTPATIENT
Start: 2024-04-17 | End: 2024-04-22 | Stop reason: HOSPADM

## 2024-04-17 RX ORDER — PANTOPRAZOLE SODIUM 40 MG/10ML
80 INJECTION, POWDER, LYOPHILIZED, FOR SOLUTION INTRAVENOUS ONCE
Status: COMPLETED | OUTPATIENT
Start: 2024-04-17 | End: 2024-04-17

## 2024-04-17 RX ORDER — LORAZEPAM 2 MG/ML
4 INJECTION INTRAMUSCULAR ONCE
Status: DISCONTINUED | OUTPATIENT
Start: 2024-04-17 | End: 2024-04-17

## 2024-04-17 RX ORDER — BISACODYL 10 MG
10 SUPPOSITORY, RECTAL RECTAL DAILY PRN
Status: DISCONTINUED | OUTPATIENT
Start: 2024-04-17 | End: 2024-04-22 | Stop reason: HOSPADM

## 2024-04-17 RX ORDER — LORAZEPAM 2 MG/ML
1 INJECTION INTRAMUSCULAR EVERY 6 HOURS PRN
Status: DISCONTINUED | OUTPATIENT
Start: 2024-04-17 | End: 2024-04-22 | Stop reason: HOSPADM

## 2024-04-17 RX ORDER — LORAZEPAM 2 MG/ML
2 INJECTION INTRAMUSCULAR
Status: DISCONTINUED | OUTPATIENT
Start: 2024-04-17 | End: 2024-04-22 | Stop reason: HOSPADM

## 2024-04-17 RX ORDER — HEPARIN SODIUM 5000 [USP'U]/ML
5000 INJECTION, SOLUTION INTRAVENOUS; SUBCUTANEOUS EVERY 8 HOURS SCHEDULED
Status: DISCONTINUED | OUTPATIENT
Start: 2024-04-17 | End: 2024-04-18

## 2024-04-17 RX ORDER — BISACODYL 5 MG/1
5 TABLET, DELAYED RELEASE ORAL DAILY PRN
Status: DISCONTINUED | OUTPATIENT
Start: 2024-04-17 | End: 2024-04-22 | Stop reason: HOSPADM

## 2024-04-17 RX ORDER — SODIUM CHLORIDE 9 MG/ML
150 INJECTION, SOLUTION INTRAVENOUS CONTINUOUS
Status: DISCONTINUED | OUTPATIENT
Start: 2024-04-17 | End: 2024-04-17

## 2024-04-17 RX ORDER — POTASSIUM CHLORIDE 20 MEQ/1
40 TABLET, EXTENDED RELEASE ORAL ONCE
Status: COMPLETED | OUTPATIENT
Start: 2024-04-17 | End: 2024-04-17

## 2024-04-17 RX ADMIN — LORAZEPAM 4 MG: 2 INJECTION, SOLUTION INTRAMUSCULAR; INTRAVENOUS at 21:23

## 2024-04-17 RX ADMIN — LORAZEPAM 4 MG: 2 INJECTION INTRAMUSCULAR; INTRAVENOUS at 21:04

## 2024-04-17 RX ADMIN — SODIUM CHLORIDE 150 ML/HR: 9 INJECTION, SOLUTION INTRAVENOUS at 23:31

## 2024-04-17 RX ADMIN — LORAZEPAM 1 MG: 2 INJECTION INTRAMUSCULAR; INTRAVENOUS at 18:44

## 2024-04-17 RX ADMIN — CEFTRIAXONE 1000 MG: 1 INJECTION, POWDER, FOR SOLUTION INTRAMUSCULAR; INTRAVENOUS at 18:44

## 2024-04-17 RX ADMIN — SODIUM CHLORIDE 1000 ML: 9 INJECTION, SOLUTION INTRAVENOUS at 15:10

## 2024-04-17 RX ADMIN — IOPAMIDOL 100 ML: 755 INJECTION, SOLUTION INTRAVENOUS at 15:06

## 2024-04-17 RX ADMIN — LORAZEPAM 4 MG: 2 INJECTION INTRAMUSCULAR; INTRAVENOUS at 21:23

## 2024-04-17 RX ADMIN — Medication 10 ML: at 21:44

## 2024-04-17 RX ADMIN — PANTOPRAZOLE SODIUM 80 MG: 40 INJECTION, POWDER, FOR SOLUTION INTRAVENOUS at 17:44

## 2024-04-17 RX ADMIN — SODIUM CHLORIDE 1000 ML: 9 INJECTION, SOLUTION INTRAVENOUS at 21:43

## 2024-04-17 RX ADMIN — POTASSIUM CHLORIDE 40 MEQ: 1500 TABLET, EXTENDED RELEASE ORAL at 18:42

## 2024-04-17 RX ADMIN — LORAZEPAM 4 MG: 2 INJECTION, SOLUTION INTRAMUSCULAR; INTRAVENOUS at 21:15

## 2024-04-17 RX ADMIN — HEPARIN SODIUM 5000 UNITS: 5000 INJECTION INTRAVENOUS; SUBCUTANEOUS at 23:30

## 2024-04-17 NOTE — LETTER
EMS Transport Request  For use at [facility], [place], [place], [place], and [place] only   Patient Name: Keagan Valenzuela : 1985   Weight:58 kg (127 lb 13.9 oz) Pick-up Location: [facility] BLS/ALS: BLS/ALS: BLS   Insurance: [employer] BLUE CROSS Auth End Date: N/A   Pre-Cert #: D/C Summary complete:    Destination: Other [facility]  ([address] [place] [id number])   Contact Precautions: None   Equipment (O2, Fluids, etc.): None   Arrive By Date/Time: bed ready after 4 PM Stretcher/WC: Stretcher   CM Requesting: Megan Naegele, RN Ext: [phone]   Notes/Medical Necessity: SI, intentional overdose.     ______________________________________________________________________    *Only 2 patient bags OR 1 carry-on size bag are permitted.  Wheelchairs and walkers CANNOT transported with the patient. Acknowledge: Yes

## 2024-04-17 NOTE — ED NOTES
Pt brought in via EMS; EMS stated multiple OTC medications observed @  alcohol in the home; pt altered, localizing with eyes, pt answering some questions asked by RN with head nods; pt unable to tell RN what he took; pt laying in bed at this time; applied to monitor; pt in gown;

## 2024-04-17 NOTE — Clinical Note
Level of Care: Telemetry [5]   Admitting Physician: LUISANA BRYAN [[id number]]   Attending Physician: LUISANA BRYAN [[id number]]

## 2024-04-17 NOTE — ED PROVIDER NOTES
Subjective   History of Present Illness  Patient is a 38-year-old male who presents today with confusion.  His mother is at bedside and reportedly called EMS and police for a wellness check.  They had to break down the door to get into the home.  Because he was unresponsive on the floor.  He had vomited on the floor gaudencio blood.        Review of Systems   Unable to perform ROS: Mental status change       No past medical history on file.    No Known Allergies    No past surgical history on file.    Family History   Problem Relation Age of Onset    No Known Problems Mother     No Known Problems Father        Social History     Socioeconomic History    Marital status: Single   Tobacco Use    Smoking status: Every Day     Current packs/day: 0.50     Types: Cigarettes    Smokeless tobacco: Never   Substance and Sexual Activity    Alcohol use: Yes     Comment: very little    Drug use: Defer    Sexual activity: Defer           Objective   Physical Exam  Constitutional:       Appearance: Normal appearance. He is well-developed. He is ill-appearing and toxic-appearing.   HENT:      Head: Normocephalic and atraumatic.      Nose: Nose normal.   Eyes:      Conjunctiva/sclera: Conjunctivae normal.      Pupils:      Right eye: Pupil is round and reactive.      Left eye: Pupil is not reactive. Pupil is round.      Comments: Pupils equal and sluggish.   Cardiovascular:      Rate and Rhythm: Normal rate and regular rhythm.   Pulmonary:      Effort: Pulmonary effort is normal. No respiratory distress.      Breath sounds: Normal breath sounds. No stridor. No wheezing, rhonchi or rales.   Musculoskeletal:         General: Normal range of motion.      Cervical back: Normal range of motion.   Skin:     General: Skin is warm and dry.   Neurological:      General: No focal deficit present.      Mental Status: He is disoriented and confused.   Psychiatric:         Mood and Affect: Mood is anxious.         Thought Content: Thought content  "normal.         Judgment: Judgment normal.         Procedures           ED Course  ED Course as of 04/17/24 1633   Wed Apr 17, 2024   1611 EKG interpreted by ER physician reviewed by myself.  Sinus tachycardia rate of 115 PVC present. [MG]      ED Course User Index  [MG] Aretha Up PA-C                                             Medical Decision Making  Appropriate PPE worn during exam.    /95   Pulse 100   Temp 98.5 °F (36.9 °C) (Rectal)   Resp 16   Ht 177.8 cm (70\")   Wt 58 kg (127 lb 13.9 oz)   SpO2 97%   BMI 18.35 kg/m²      Co-morbidities --  has no past medical history on file.  Radiology interpretation --  X-rays reviewed by me and independently interpreted by radiologist:  CT Abdomen Pelvis With Contrast    Result Date: 4/17/2024  Impression: 1. Generalized air of fluid distention of the bowel, greatest in the ascending colon and transverse colon. Findings favor the presence of diffuse ileus. 2. There is some layering high density material within the lumen of the stomach, which could represent ingested material or perhaps could represent a small amount of blood products, given the provided history of hematemesis. No focal gastric inflammatory  change or mass lesion is identified. 3. Tiny dots of air are seen along the periphery of the ascending colon, favored represent air within the lumen of the bowel rather than that of pneumatosis. However, to be on the safe side, I would recommend short-term abdominal x-ray/KUB follow-up to ensure stability, improvement or resolution. Electronically Signed: Vane Silva MD  4/17/2024 3:18 PM EDT  Workstation ID: [id number]    CT Head Without Contrast    Result Date: 4/17/2024  Impression: No acute intracranial pathology. Examination limited due to patient motion. Electronically Signed: Davis Hassan MD  4/17/2024 3:12 PM EDT  Workstation ID: [id number]    XR Chest 1 View    Result Date: 4/17/2024  Impression: No acute chest finding. Electronically Signed: " Vane Silva MD  4/17/2024 2:07 PM EDT  Workstation ID: [id number]    Patient is a 38-year-old male who presents with confusion and altered mental status of note he was seen here 2 days ago after having an effect from Benadryl use.  He again reports that he took a lot of Benadryl to try to help him sleep.  His urine tox was negative.  Does have acute rhabdomyolysis.  He did vomit gaudencio blood.  GI consult will be obtained.  Patient was given IV fluids for rhabdomyolysis.  I did discuss with case with Dr. Beltran who recommended admittance and IV fluids and monitoring for resolution of symptoms.  CT head was negative.  He was stable with mild improvement while he was here family at bedside in agreement with plan.  I discussed the findings and recommendations with the patient who voices understanding. Stable while in the ER.     Note Disclaimer: At [facility], we believe that sharing information builds trust and better relationships. You are receiving this note because you are receiving care at [facility] or recently visited. It is possible you will see health information before a provider has talked with you about it. This kind of information can be easy to misunderstand. To help you fully understand what it means for your health, we urge you to discuss this note with your provider.        Problems Addressed:  Altered mental status, unspecified altered mental status type: complicated acute illness or injury  Anticholinergic drug overdose, undetermined intent, initial encounter: complicated acute illness or injury  Hematemesis, unspecified whether nausea present: complicated acute illness or injury  Non-traumatic rhabdomyolysis: complicated acute illness or injury    Amount and/or Complexity of Data Reviewed  Labs: ordered.  Radiology: ordered.    Risk  Prescription drug management.  Decision regarding hospitalization.        Final diagnoses:   Altered mental status, unspecified altered mental status type    Non-traumatic rhabdomyolysis   Hematemesis, unspecified whether nausea present   Anticholinergic drug overdose, undetermined intent, initial encounter       ED Disposition  ED Disposition       ED Disposition   Decision to Admit    Condition   --    Comment   --               No follow-up provider specified.       Medication List      No changes were made to your prescriptions during this visit.            Aretha Up PA-C  04/17/24 1130

## 2024-04-17 NOTE — H&P
[facility]  History & Physical    Patient Name: Keagan Valenzuela  : 1985  MRN: [id number]  Primary Care Physician:  Keri Shrestha APRN  Date of admission: 2024  Date and Time of Service: 2024    Subjective      Chief Complaint: Unresponsive    History of Present Illness:   This is a 37 years old male with no significant past medical history who was brought in after he was found unconscious at home.  Per family he has been acting strange and isolating himself, looks depressed and was recently seen in the ED, he was discharged home and instructed to stay away from Benadryl and NyQuil.  Given his family did not hear from him today EMS was called, his stool was broken and patient was followed unresponsive on the floor with empty bottles of Benadryl, NyQuil, DayQuil and he was then brought to the ED for further workup and management    Vital signs on presentation showed a blood pressure of 135/95, afebrile, respiratory rate 16 saturating 97% on room air with a heart rate of 100.     Labs showed creatinine kinase 13,383, sodium 140, potassium 3.4, bicarb 25, anion gap 16, WBC 13.3 otherwise CBC within normal limit, urinalysis was positive for WBC and protein, urine culture was negative.    Chest x-ray showed no acute chest finding  CT abdomen and pelvis showed generalized air of fluid distention of the bowel, greatest in the ascending colon and transverse colon. Findings favor the presence of diffuse ileus.  There is some layering high density material within the lumen of the stomach, which could represent ingested material or perhaps could represent a small amount of blood products, given the provided history of hematemesis. Tiny dots of air are seen along the periphery of the ascending colon, favored represent air within the lumen of the bowel rather than that of pneumatosis.       eReview of Systems    Personal History     No past medical history on file.    No  past surgical history on file.    Family History: family history includes No Known Problems in his father and mother. Otherwise pertinent FHx was reviewed and not pertinent to current issue.    Social History:  reports that he has been smoking cigarettes. He has never used smokeless tobacco. He reports current alcohol use. Drug use questions deferred to the physician.    Home Medications:  Prior to Admission Medications       None              Allergies:  No Known Allergies    Objective      Vitals:   Temp:  [98.5 °F (36.9 °C)] 98.5 °F (36.9 °C)  Heart Rate:  [100-113] 100  Resp:  [16] 16  BP: (114-155)/(55-95) 135/95  Body mass index is 18.35 kg/m².  Physical Exam    Appearance: No apparent distress, non-toxic appearing   HEENT/Neck: Neck is supple, Extraocular movements intact,   Cardiovascular: Tachycardic with regular rhythm  Pulmonary: Clear to auscultation Bilaterally.   Abdomen: BS+, Soft, non-tender, non-distended.   Ext: No Cyanosis, Clubbing, Edema.   Neuro: Non-focal, Alert & Oriented x 3          Diagnostic Data:  Lab Results (last 24 hours)       Procedure Component Value Units Date/Time    Urine Drug Screen - Urine, Clean Catch [[id number]]  (Normal) Collected: 04/17/24 1416    Specimen: Urine, Clean Catch Updated: 04/17/24 1609     Amphet/Methamphet, Screen Negative     Barbiturates Screen, Urine Negative     Benzodiazepine Screen, Urine Negative     Cocaine Screen, Urine Negative     Opiate Screen Negative     THC, Screen, Urine Negative     Methadone Screen, Urine Negative     Oxycodone Screen, Urine Negative    Narrative:      Negative Thresholds Per Drugs Screened:    Amphetamines                 500 ng/ml  Barbiturates                 200 ng/ml  Benzodiazepines              100 ng/ml  Cocaine                      300 ng/ml  Methadone                    300 ng/ml  Opiates                      300 ng/ml  Oxycodone                    100 ng/ml  THC                           50 ng/ml    The Normal  Value for all drugs tested is negative. This report includes final unconfirmed screening results to be used for medical treatment purposes only. Unconfirmed results must not be used for non-medical purposes such as employment or legal testing. Clinical consideration should be applied to any drug of abuse test, particularly when unconfirmed results are used.          All urine drugs of abuse requests without chain of custody are for medical screening purposes only.  False positives are possible.      Urinalysis, Microscopic Only - Indwelling Urethral Catheter [[id number]]  (Abnormal) Collected: 04/17/24 1416    Specimen: Urine from Indwelling Urethral Catheter Updated: 04/17/24 1520     RBC, UA 0-2 /HPF      WBC, UA 3-5 /HPF      Bacteria, UA None Seen /HPF      Squamous Epithelial Cells, UA 0-2 /HPF      Hyaline Casts, UA 7-12 /LPF      Fine Granular Casts, UA 0-2 /LPF      WBC Clumps, UA Small/1+ /HPF      Methodology Manual Light Microscopy    Gilman Draw [[id number]] Collected: 04/17/24 1405    Specimen: Blood Updated: 04/17/24 1515    Narrative:      The following orders were created for panel order Gilman Draw.  Procedure                               Abnormality         Status                     ---------                               -----------         ------                     Green Top (Gel)[[id number]]                                  Final result               Lavender Top[[id number]]                                     Final result               Gold Top - SST[[id number]]                                   Final result               Light Blue Top[[id number]]                                   Final result                 Please view results for these tests on the individual orders.    Green Top (Gel) [[id number]] Collected: 04/17/24 1405    Specimen: Blood Updated: 04/17/24 1515     Extra Tube Hold for add-ons.     Comment: Auto resulted.       Lavender Top [[id number]] Collected: 04/17/24 1405    Specimen:  Blood Updated: 04/17/24 1515     Extra Tube hold for add-on     Comment: Auto resulted       Light Blue Top [[id number]] Collected: 04/17/24 1405    Specimen: Blood Updated: 04/17/24 1515     Extra Tube Hold for add-ons.     Comment: Auto resulted       CK [[id number]]  (Abnormal) Collected: 04/17/24 1405    Specimen: Blood Updated: 04/17/24 1446     Creatine Kinase 13,383 U/L     Ethanol [[id number]] Collected: 04/17/24 1405    Specimen: Blood Updated: 04/17/24 1444     Ethanol % <0.010 %     Narrative:      Plasma Ethanol Clinical Symptoms:    ETOH (%)               Clinical Symptom  .01-.05              No apparent influence  .03-.12              Euphoria, Diminished judgment and attention   .09-.25              Impaired comprehension, Muscle incoordination  .18-.30              Confusion, Staggered gait, Slurred speech  .25-.40              Markedly decreased response to stimuli, unable to stand or                        walk, vomitting, sleep or stupor  .35-.50              Comatose, Anesthesia, Subnormal body temperature        Gold Top - SST [[id number]] Collected: 04/17/24 1405    Specimen: Blood Updated: 04/17/24 1439     Extra Tube hold    POC Glucose Once [[id number]]  (Normal) Collected: 04/17/24 1435    Specimen: Blood Updated: 04/17/24 1436     Glucose 100 mg/dL      Comment: Serial Number: [id number]:  [id number]       Comprehensive Metabolic Panel [[id number]]  (Abnormal) Collected: 04/17/24 1405    Specimen: Blood Updated: 04/17/24 1434     Glucose 92 mg/dL      BUN 10 mg/dL      Creatinine 1.20 mg/dL      Sodium 140 mmol/L      Potassium 3.4 mmol/L      Comment: Slight hemolysis detected by analyzer. Result may be falsely elevated.        Chloride 99 mmol/L      CO2 25.0 mmol/L      Calcium 10.0 mg/dL      Total Protein 7.1 g/dL      Albumin 4.7 g/dL      ALT (SGPT) 31 U/L      AST (SGOT) 122 U/L      Alkaline Phosphatase 79 U/L      Total Bilirubin 0.6 mg/dL      Globulin 2.4 gm/dL      A/G Ratio  2.0 g/dL      BUN/Creatinine Ratio 8.3     Anion Gap 16.0 mmol/L      eGFR 78.9 mL/min/1.73     Narrative:      GFR Normal >60  Chronic Kidney Disease <60  Kidney Failure <15      Urinalysis With Microscopic If Indicated (No Culture) - Indwelling Urethral Catheter [[id number]]  (Abnormal) Collected: 04/17/24 1416    Specimen: Urine from Indwelling Urethral Catheter Updated: 04/17/24 1431     Color, UA Yellow     Appearance, UA Clear     pH, UA 5.5     Specific Gravity, UA 1.020     Glucose, UA Negative     Ketones, UA Negative     Bilirubin, UA Negative     Blood, UA Large (3+)     Protein, UA 30 mg/dL (1+)     Leuk Esterase, UA Negative     Nitrite, UA Negative     Urobilinogen, UA 1.0 E.U./dL    CBC & Differential [[id number]]  (Abnormal) Collected: 04/17/24 1405    Specimen: Blood Updated: 04/17/24 1412    Narrative:      The following orders were created for panel order CBC & Differential.  Procedure                               Abnormality         Status                     ---------                               -----------         ------                     CBC Auto Differential[[id number]]        Abnormal            Final result                 Please view results for these tests on the individual orders.    CBC Auto Differential [[id number]]  (Abnormal) Collected: 04/17/24 1405    Specimen: Blood Updated: 04/17/24 1412     WBC 13.31 10*3/mm3      RBC 4.84 10*6/mm3      Hemoglobin 14.7 g/dL      Hematocrit 40.9 %      MCV 84.5 fL      MCH 30.4 pg      MCHC 35.9 g/dL      RDW 12.5 %      RDW-SD 38.2 fl      MPV 11.0 fL      Platelets 213 10*3/mm3      Neutrophil % 83.7 %      Lymphocyte % 11.2 %      Monocyte % 4.5 %      Eosinophil % 0.1 %      Basophil % 0.1 %      Immature Grans % 0.4 %      Neutrophils, Absolute 11.15 10*3/mm3      Lymphocytes, Absolute 1.49 10*3/mm3      Monocytes, Absolute 0.60 10*3/mm3      Eosinophils, Absolute 0.01 10*3/mm3      Basophils, Absolute 0.01 10*3/mm3      Immature Grans,  Absolute 0.05 10*3/mm3      nRBC 0.0 /100 WBC              Imaging Results (Last 24 Hours)       Procedure Component Value Units Date/Time    CT Abdomen Pelvis With Contrast [[id number]] Collected: 04/17/24 1509     Updated: 04/17/24 1520    Narrative:      CT ABDOMEN PELVIS W CONTRAST    Date of Exam: 4/17/2024 3:02 PM EDT    Indication: vomiting blood. AMS.    Comparison: None available.    Technique: Axial CT images were obtained of the abdomen and pelvis following the uneventful intravenous administration of iodinated contrast. Sagittal and coronal reconstructions were performed.  Automated exposure control and iterative reconstruction   methods were used.        Findings:  Layering high density material is seen within the stomach, which could represent blood products, given the history of hematemesis, or could represent ingested high density material within the gastric lumen. No gastric mass or focal gastric inflammatory   change is appreciated.    There is moderate air-fluid distention of the ascending colon and transverse colon and splenic flexure of the colon. More mild air-fluid distention is seen within the large and small bowel loops. The overall pattern is favored to represent generalized   ileus.    The appendix is filled with air and appears normal (series 4 image 87).    Small dots of air are seen along the walls of the ascending colon, thought to be within the lumen of the bowel rather than representing bowel wall pneumatosis.    No free air or free fluid or adenopathy is seen within the abdomen/pelvis.    Urinary bladder, prostate and rectum are normal.    Liver, gallbladder, spleen, pancreas, adrenals, and kidneys are within normal limits.    The lung bases are clear. The heart size is within normal limits.    No acute or suspicious osseous abnormalities are identified.      Impression:      Impression:    1. Generalized air of fluid distention of the bowel, greatest in the ascending colon and  transverse colon. Findings favor the presence of diffuse ileus.  2. There is some layering high density material within the lumen of the stomach, which could represent ingested material or perhaps could represent a small amount of blood products, given the provided history of hematemesis. No focal gastric inflammatory   change or mass lesion is identified.  3. Tiny dots of air are seen along the periphery of the ascending colon, favored represent air within the lumen of the bowel rather than that of pneumatosis. However, to be on the safe side, I would recommend short-term abdominal x-ray/KUB follow-up to   ensure stability, improvement or resolution.          Electronically Signed: Vane Silva MD    4/17/2024 3:18 PM EDT    Workstation ID: [id number]    CT Head Without Contrast [[id number]] Collected: 04/17/24 1510     Updated: 04/17/24 1514    Narrative:      CT HEAD WO CONTRAST    Date of Exam: 4/17/2024 3:02 PM EDT    Indication: AMS.    Comparison: None available.    Technique: Axial CT images were obtained of the head without contrast administration.  Coronal reconstructions were performed.  Automated exposure control and iterative reconstruction methods were used.    Findings: Exam limited by patient motion throughout the study. Gray-white matter differentiation is maintained without evidence of an acute infarction. No intracranial mass or mass effect. No extra-axial mass or collection. The ventricles and sulci are   normal in size and configuration. The posterior fossa appears normal. Sellar and suprasellar structures are normal.    Orbital and paranasal soft tissues are normal. The paranasal sinuses, ethmoid air cells, and mastoid air cells are aerated. The bony calvarium appears intact. No acute fractures. No lytic or blastic bony diseases. Cerumen within the external acoustic   canals.      Impression:      Impression: No acute intracranial pathology. Examination limited due to patient  motion.          Electronically Signed: Davis Hassan MD    4/17/2024 3:12 PM EDT    Workstation ID: [id number]    XR Chest 1 View [[id number]] Collected: 04/17/24 1407     Updated: 04/17/24 1410    Narrative:      XR CHEST 1 VW    Date of Exam: 4/17/2024 2:04 PM EDT    Indication: AMS Protocol  AMS Protocol    Comparison: None available.    Findings:  No acute airspace disease. Heart size is within normal limits. No pleural effusion or pneumothorax or acute osseous abnormality      Impression:      Impression:  No acute chest finding.      Electronically Signed: Vane Silva MD    4/17/2024 2:07 PM EDT    Workstation ID: [id number]              Assessment & Plan    Drug overdose  -Family found empty bottles of Benadryl, NyQuil, DayQuil while patient was found unconscious.  -He is awake now, still confused and mildly tachycardic.  -? hematemesis on presentation.  -Continue to monitor patient on telemetry, continue aggressive volume resuscitation  -Gastroenterology has been consulted.        Rhabdomyolysis  -Rhabdomyolysis likely from laying on the floor, it is unknown how long he was down.    -Continue volume resuscitation and trend creatinine kinase.  -continue to monitor       Leukocytosis  -Mild leukocytosis, with WBC on urinalysis, could be hemoconcentration however will cover with ceftriaxone for now until patient is more alert to answer questions.      Hypokalemia  -Replete with 40 mEq of potassium chloride x 1          DVT prophylaxis:  Full code  Continue inpatient level care  Medical DVT prophylaxis orders are signed and held.            Signature:     This document has been electronically signed by Anitra Martino MD on April 17, 2024 17:21 EDT   [facility] Hospitalist Team

## 2024-04-17 NOTE — ED NOTES
Family at bedside; family states pt took OTC meds Monday due to not being able to sleep; pt denies medical history for pt

## 2024-04-17 NOTE — ED NOTES
Pt states he is feeling better; pt states he took benadryl, unknown amount; pt states he didn't drink alcohol; pt would not respond as to why he took benadryl;

## 2024-04-17 NOTE — PROGRESS NOTES
At this time RT is unable to attain ABG. Patient was flailing arms, RT had assistance with holding patient arms, as soon as RT stuck patient began jerking arms around again, at this time due to patient and staff safety RT has recommended to hold off on ABG until patient has had a chance to relax. RT spoke to NP and agree to wait for now.

## 2024-04-18 ENCOUNTER — INPATIENT HOSPITAL (OUTPATIENT)
Dept: URBAN - METROPOLITAN AREA HOSPITAL 84 | Facility: HOSPITAL | Age: 39
End: 2024-04-18

## 2024-04-18 DIAGNOSIS — K92.0 HEMATEMESIS: ICD-10-CM

## 2024-04-18 LAB
ALBUMIN SERPL-MCNC: 3.5 G/DL (ref 3.5–5.2)
ALBUMIN/GLOB SERPL: 1.5 G/DL
ALP SERPL-CCNC: 61 U/L (ref 39–117)
ALT SERPL W P-5'-P-CCNC: 61 U/L (ref 1–41)
ANION GAP SERPL CALCULATED.3IONS-SCNC: 6 MMOL/L (ref 5–15)
APAP SERPL-MCNC: <5 MCG/ML (ref 0–30)
AST SERPL-CCNC: 325 U/L (ref 1–40)
BASOPHILS # BLD AUTO: 0.02 10*3/MM3 (ref 0–0.2)
BASOPHILS NFR BLD AUTO: 0.3 % (ref 0–1.5)
BILIRUB SERPL-MCNC: 0.6 MG/DL (ref 0–1.2)
BUN SERPL-MCNC: 9 MG/DL (ref 6–20)
BUN/CREAT SERPL: 7.9 (ref 7–25)
CALCIUM SPEC-SCNC: 8.5 MG/DL (ref 8.6–10.5)
CHLORIDE SERPL-SCNC: 108 MMOL/L (ref 98–107)
CK SERPL-CCNC: ABNORMAL U/L (ref 20–200)
CO2 SERPL-SCNC: 26 MMOL/L (ref 22–29)
CREAT SERPL-MCNC: 1.14 MG/DL (ref 0.76–1.27)
DEPRECATED RDW RBC AUTO: 42.8 FL (ref 37–54)
EGFRCR SERPLBLD CKD-EPI 2021: 83.9 ML/MIN/1.73
EOSINOPHIL # BLD AUTO: 0.03 10*3/MM3 (ref 0–0.4)
EOSINOPHIL NFR BLD AUTO: 0.4 % (ref 0.3–6.2)
ERYTHROCYTE [DISTWIDTH] IN BLOOD BY AUTOMATED COUNT: 13.2 % (ref 12.3–15.4)
GLOBULIN UR ELPH-MCNC: 2.3 GM/DL
GLUCOSE BLDC GLUCOMTR-MCNC: 119 MG/DL (ref 70–105)
GLUCOSE BLDC GLUCOMTR-MCNC: 184 MG/DL (ref 70–105)
GLUCOSE BLDC GLUCOMTR-MCNC: 69 MG/DL (ref 70–105)
GLUCOSE SERPL-MCNC: 88 MG/DL (ref 65–99)
HCT VFR BLD AUTO: 38.6 % (ref 37.5–51)
HGB BLD-MCNC: 12.8 G/DL (ref 13–17.7)
IMM GRANULOCYTES # BLD AUTO: 0.02 10*3/MM3 (ref 0–0.05)
IMM GRANULOCYTES NFR BLD AUTO: 0.3 % (ref 0–0.5)
LYMPHOCYTES # BLD AUTO: 1.13 10*3/MM3 (ref 0.7–3.1)
LYMPHOCYTES NFR BLD AUTO: 14.6 % (ref 19.6–45.3)
MCH RBC QN AUTO: 29.8 PG (ref 26.6–33)
MCHC RBC AUTO-ENTMCNC: 33.2 G/DL (ref 31.5–35.7)
MCV RBC AUTO: 89.8 FL (ref 79–97)
MONOCYTES # BLD AUTO: 0.46 10*3/MM3 (ref 0.1–0.9)
MONOCYTES NFR BLD AUTO: 6 % (ref 5–12)
NEUTROPHILS NFR BLD AUTO: 6.07 10*3/MM3 (ref 1.7–7)
NEUTROPHILS NFR BLD AUTO: 78.4 % (ref 42.7–76)
NRBC BLD AUTO-RTO: 0 /100 WBC (ref 0–0.2)
PLATELET # BLD AUTO: 145 10*3/MM3 (ref 140–450)
PMV BLD AUTO: 10.7 FL (ref 6–12)
POTASSIUM SERPL-SCNC: 3.8 MMOL/L (ref 3.5–5.2)
PROT SERPL-MCNC: 5.8 G/DL (ref 6–8.5)
QT INTERVAL: 342 MS
QTC INTERVAL: 474 MS
RBC # BLD AUTO: 4.3 10*6/MM3 (ref 4.14–5.8)
SALICYLATES SERPL-MCNC: <0.3 MG/DL
SODIUM SERPL-SCNC: 140 MMOL/L (ref 136–145)
WBC NRBC COR # BLD AUTO: 7.73 10*3/MM3 (ref 3.4–10.8)

## 2024-04-18 PROCEDURE — 25010000002 HEPARIN (PORCINE) PER 1000 UNITS: Performed by: INTERNAL MEDICINE

## 2024-04-18 PROCEDURE — 99222 1ST HOSP IP/OBS MODERATE 55: CPT | Performed by: NURSE PRACTITIONER

## 2024-04-18 PROCEDURE — 85025 COMPLETE CBC W/AUTO DIFF WBC: CPT | Performed by: STUDENT IN AN ORGANIZED HEALTH CARE EDUCATION/TRAINING PROGRAM

## 2024-04-18 PROCEDURE — 25810000003 DEXTROSE-NACL PER 500 ML: Performed by: INTERNAL MEDICINE

## 2024-04-18 PROCEDURE — 25010000002 LORAZEPAM PER 2 MG: Performed by: NURSE PRACTITIONER

## 2024-04-18 PROCEDURE — 80053 COMPREHEN METABOLIC PANEL: CPT | Performed by: STUDENT IN AN ORGANIZED HEALTH CARE EDUCATION/TRAINING PROGRAM

## 2024-04-18 PROCEDURE — 82948 REAGENT STRIP/BLOOD GLUCOSE: CPT

## 2024-04-18 PROCEDURE — 25010000002 CEFTRIAXONE PER 250 MG: Performed by: INTERNAL MEDICINE

## 2024-04-18 PROCEDURE — 82550 ASSAY OF CK (CPK): CPT | Performed by: INTERNAL MEDICINE

## 2024-04-18 RX ORDER — DEXTROSE MONOHYDRATE 25 G/50ML
INJECTION, SOLUTION INTRAVENOUS
Status: COMPLETED
Start: 2024-04-18 | End: 2024-04-18

## 2024-04-18 RX ORDER — NICOTINE POLACRILEX 4 MG
15 LOZENGE BUCCAL
Status: DISCONTINUED | OUTPATIENT
Start: 2024-04-18 | End: 2024-04-22 | Stop reason: HOSPADM

## 2024-04-18 RX ORDER — DEXTROSE AND SODIUM CHLORIDE 5; .9 G/100ML; G/100ML
150 INJECTION, SOLUTION INTRAVENOUS CONTINUOUS
Status: DISCONTINUED | OUTPATIENT
Start: 2024-04-18 | End: 2024-04-20

## 2024-04-18 RX ORDER — DEXTROSE MONOHYDRATE 25 G/50ML
25 INJECTION, SOLUTION INTRAVENOUS
Status: DISCONTINUED | OUTPATIENT
Start: 2024-04-18 | End: 2024-04-22 | Stop reason: HOSPADM

## 2024-04-18 RX ORDER — IBUPROFEN 600 MG/1
1 TABLET ORAL
Status: DISCONTINUED | OUTPATIENT
Start: 2024-04-18 | End: 2024-04-22 | Stop reason: HOSPADM

## 2024-04-18 RX ADMIN — PANTOPRAZOLE SODIUM 40 MG: 40 INJECTION, POWDER, FOR SOLUTION INTRAVENOUS at 18:00

## 2024-04-18 RX ADMIN — LORAZEPAM 2 MG: 2 INJECTION INTRAMUSCULAR; INTRAVENOUS at 01:45

## 2024-04-18 RX ADMIN — HEPARIN SODIUM 5000 UNITS: 5000 INJECTION INTRAVENOUS; SUBCUTANEOUS at 05:57

## 2024-04-18 RX ADMIN — Medication 10 ML: at 11:17

## 2024-04-18 RX ADMIN — Medication 10 ML: at 20:07

## 2024-04-18 RX ADMIN — LORAZEPAM 2 MG: 2 INJECTION INTRAMUSCULAR; INTRAVENOUS at 11:16

## 2024-04-18 RX ADMIN — DEXTROSE MONOHYDRATE 25 G: 25 INJECTION, SOLUTION INTRAVENOUS at 08:01

## 2024-04-18 RX ADMIN — LORAZEPAM 2 MG: 2 INJECTION INTRAMUSCULAR; INTRAVENOUS at 01:22

## 2024-04-18 RX ADMIN — DEXTROSE AND SODIUM CHLORIDE 150 ML/HR: 5; 900 INJECTION, SOLUTION INTRAVENOUS at 20:16

## 2024-04-18 RX ADMIN — LORAZEPAM 2 MG: 2 INJECTION INTRAMUSCULAR; INTRAVENOUS at 05:56

## 2024-04-18 RX ADMIN — CEFTRIAXONE 1000 MG: 1 INJECTION, POWDER, FOR SOLUTION INTRAMUSCULAR; INTRAVENOUS at 18:00

## 2024-04-18 RX ADMIN — PANTOPRAZOLE SODIUM 40 MG: 40 INJECTION, POWDER, FOR SOLUTION INTRAVENOUS at 05:56

## 2024-04-18 NOTE — PLAN OF CARE
Was called to evaluate patient due to change of status    Patient arrived to the ED around 2:00 this afternoon and was admitted by hospitalist for Benadryl overdose.  Nurse reports patient has been redirectable throughout the day but over the past hour has become very anxious and combative.  Patient received 1 mg of Ativan with no change in symptoms.  Patient still communicating but appears to be experiencing signs of psychosis.  Hospital security was called and patient was placed in violent restraints.  Patient's father and brother are at bedside.  Per patient's brother there was empty bottles found by fire department earlier today of NyQuil, Benadryl and melatonin.  Patient has ingested unknown amount.  Unknown if other substances were consumed.  Talk screen was negative.  Suspect overdose was intentional as patient was also admitted Monday this week with similar and was discharged home.    Review of chart shows patient positive for rhabdo myelosis with CK 13,383.  Patient pulling at lines.  Difficult to monitor vital signs difficult to provide fluids.  Noted heart rate as high in the 150s.   ALT and bilirubin within normal limits.  Kidney function appears stable as of 2 PM with BUN 10 and creatinine 1.2.  Anion gap was 16.  Need to monitor labs and vital signs closely.  Monitor urine output closely and monitor kidney function.  Repeat EKG and monitor QT interval    Patient evaluated at bedside.  Pupils dilated.  Patient very anxious and diaphoretic.  Fighting restraints.  Positive for nystagmus.  Call was placed to poison control.  I discussed case with Viktoria.  Recommended to load with benzodiazepines.  Recommended Ativan 1 to 4 mg every 30 minutes or Valium or Versed 5 to 10 mg every 30 minutes.  If no response to Ativan and Valium or Versed may escalate to phenobarbital.  Was recommended to avoid antipsychotics and Precedex.  Last resort would be intubation with propofol.  Infectious disease recommended to  continue aggressive IV fluid hydration but monitor for urinary retention by performing periodic bladder scans as this may also cause severe agitation.  Recommending not routinely using medications such as physostigmine but could be a possibility but would need to discuss case with toxicology prior to administration.  Controlled advised to call back for any further questions and also will also follow-up .Will need to monitor EKG for widening QT interval    Placed patient on suicide precautions.  Consult psychiatry.  It was also noted that patient had vomited blood in triage.  GI has been notified and recommended PPI.  Patient is at high risk  need for intubation.  Also consider may need NG tube to remove what CT identified as ingested material perhaps small amount of blood products.  Will need to monitor for signs of aspiration    Intensivist NP has been consulted and aware of possible need for intubation and is following closely

## 2024-04-18 NOTE — CONSULTS
GI CONSULT  NOTE:    Referring Provider:  Dr. Eugene    Chief complaint: Hematemesis    Subjective .  Unable to currently assess/patient sleeping    History of present illness: Keagan Valenzuela is a 38 y.o. male who presents with an intentional overdose of Benadryl, NyQuil and DayQuil for suicide attempt.  Family found him unresponsive at home.  Per report, patient had nausea and vomiting in the ER with some blood noted.  Nurse reports no bowel movement since admission.  Father reports that no vomiting has been witnessed by him.  Patient is more alert today and has been talking to the nurse.    Endo History:  None    Past Medical History:  History reviewed. No pertinent past medical history.    Past Surgical History:  History reviewed. No pertinent surgical history.    Social History:  Social History     Tobacco Use    Smoking status: Every Day     Current packs/day: 0.50     Types: Cigarettes    Smokeless tobacco: Never   Vaping Use    Vaping status: Never Used   Substance Use Topics    Alcohol use: Yes     Comment: very little    Drug use: Defer       Family History:  Family History   Problem Relation Age of Onset    No Known Problems Mother     No Known Problems Father        Medications:  No medications prior to admission.       Scheduled Meds:cefTRIAXone, 1,000 mg, Intravenous, Q24H  heparin (porcine), 5,000 Units, Subcutaneous, Q8H  pantoprazole, 40 mg, Intravenous, Q12H  sodium chloride, 10 mL, Intravenous, Q12H      Continuous Infusions:sodium chloride, 150 mL/hr, Last Rate: 150 mL/hr (04/17/24 3859)      PRN Meds:.  senna-docusate sodium **AND** polyethylene glycol **AND** bisacodyl **AND** bisacodyl    dextrose    dextrose    glucagon (human recombinant)    LORazepam    LORazepam    LORazepam    ondansetron ODT **OR** ondansetron    sodium chloride    sodium chloride    sodium chloride    ALLERGIES:  Patient has no known allergies.    ROS:  Unable to fully assess    Objective sleeping comfortably,  "ill-appearing but no acute distress, family at bedside, sitter in room    Vital Signs:   Vitals:    04/18/24 0600 04/18/24 0630 04/18/24 0700 04/18/24 0800   BP: 116/74 100/72 104/72 109/75   Pulse: 91 87 90 93   Resp:   18    Temp: 97.7 °F (36.5 °C) 97.9 °F (36.6 °C) 98.1 °F (36.7 °C) 98.2 °F (36.8 °C)   TempSrc:    Bladder   SpO2: 99% 99% 98% 100%   Weight:       Height:           Physical Exam:     General Appearance:  Sleeping   Head:    Normocephalic, without obvious abnormality, atraumatic   Throat:   No oral lesions, no thrush, oral mucosa moist   Lungs:     Respirations regular, even and unlabored   Chest Wall:    No abnormalities observed   Abdomen:     nondistended   Rectal:     Deferred   Extremities:   Moves all extremities,  no cyanosis       Skin:   No rash, no jaundice, normal palpation       Neurologic: Sleeping       Results Review:   I reviewed the patient's labs and imaging.  CBC    Results from last 7 days   Lab Units 04/18/24 0922 04/17/24 2318 04/17/24 2120 04/17/24  1405 04/15/24  0831   WBC 10*3/mm3 7.73 9.60  --  13.31* 7.47   HEMOGLOBIN g/dL 12.8* 12.8*  --  14.7 13.8   HEMOGLOBIN, POC g/dL  --   --  14.0  --   --    PLATELETS 10*3/mm3 145 164  --  213 176     CMP   Results from last 7 days   Lab Units 04/18/24 0922 04/17/24 2318 04/17/24 2120 04/17/24  1405 04/15/24  0831   SODIUM mmol/L 140 140  --  140 135*   POTASSIUM mmol/L 3.8 4.3  --  3.4* 3.3*   CHLORIDE mmol/L 108* 106  --  99 96*   CO2 mmol/L 26.0 23.0  --  25.0 28.0   BUN mg/dL 9 8  --  10 9   CREATININE mg/dL 1.14 1.23 1.16 1.20 1.27   GLUCOSE mg/dL 88 85  --  92 115*   ALBUMIN g/dL 3.5 3.7  --  4.7 4.2   BILIRUBIN mg/dL 0.6 0.6  --  0.6 0.6   ALK PHOS U/L 61 63  --  79 63   AST (SGOT) U/L 325* 273*  --  122* 20   ALT (SGPT) U/L 61* 51*  --  31 11     Cr Clearance Estimated Creatinine Clearance: 71.4 mL/min (by C-G formula based on SCr of 1.14 mg/dL).  Coag     HbA1C No results found for: \"HGBA1C\"  Blood Glucose   Glucose "   Date/Time Value Ref Range Status   04/18/2024 0814 184 (H) 70 - 105 mg/dL Final     Comment:     Serial Number: [id number]:  [id number]   04/18/2024 0741 69 (L) 70 - 105 mg/dL Final     Comment:     Serial Number: [id number]:  [id number]   04/17/2024 2120 107 (H) 74 - 100 mg/dL Final   04/17/2024 2120 107 (H) 74 - 100 mg/dL Final     Comment:     Serial Number: [id number]:  [id number]   04/17/2024 1435 100 70 - 105 mg/dL Final     Comment:     Serial Number: [id number]:  [id number]     Infection     UA    Results from last 7 days   Lab Units 04/17/24  1416   NITRITE UA  Negative   WBC UA /HPF 3-5*   BACTERIA UA /HPF None Seen   SQUAM EPITHEL UA /HPF 0-2     Imaging Results (Last 72 Hours)       Procedure Component Value Units Date/Time    CT Abdomen Pelvis With Contrast [[id number]] Collected: 04/17/24 1509     Updated: 04/17/24 1520    Narrative:      CT ABDOMEN PELVIS W CONTRAST    Date of Exam: 4/17/2024 3:02 PM EDT    Indication: vomiting blood. AMS.    Comparison: None available.    Technique: Axial CT images were obtained of the abdomen and pelvis following the uneventful intravenous administration of iodinated contrast. Sagittal and coronal reconstructions were performed.  Automated exposure control and iterative reconstruction   methods were used.        Findings:  Layering high density material is seen within the stomach, which could represent blood products, given the history of hematemesis, or could represent ingested high density material within the gastric lumen. No gastric mass or focal gastric inflammatory   change is appreciated.    There is moderate air-fluid distention of the ascending colon and transverse colon and splenic flexure of the colon. More mild air-fluid distention is seen within the large and small bowel loops. The overall pattern is favored to represent generalized   ileus.    The appendix is filled with air and appears normal (series 4 image 87).    Small dots of air  are seen along the walls of the ascending colon, thought to be within the lumen of the bowel rather than representing bowel wall pneumatosis.    No free air or free fluid or adenopathy is seen within the abdomen/pelvis.    Urinary bladder, prostate and rectum are normal.    Liver, gallbladder, spleen, pancreas, adrenals, and kidneys are within normal limits.    The lung bases are clear. The heart size is within normal limits.    No acute or suspicious osseous abnormalities are identified.      Impression:      Impression:    1. Generalized air of fluid distention of the bowel, greatest in the ascending colon and transverse colon. Findings favor the presence of diffuse ileus.  2. There is some layering high density material within the lumen of the stomach, which could represent ingested material or perhaps could represent a small amount of blood products, given the provided history of hematemesis. No focal gastric inflammatory   change or mass lesion is identified.  3. Tiny dots of air are seen along the periphery of the ascending colon, favored represent air within the lumen of the bowel rather than that of pneumatosis. However, to be on the safe side, I would recommend short-term abdominal x-ray/KUB follow-up to   ensure stability, improvement or resolution.          Electronically Signed: Vane Silva MD    4/17/2024 3:18 PM EDT    Workstation ID: [id number]    CT Head Without Contrast [[id number]] Collected: 04/17/24 1510     Updated: 04/17/24 1514    Narrative:      CT HEAD WO CONTRAST    Date of Exam: 4/17/2024 3:02 PM EDT    Indication: AMS.    Comparison: None available.    Technique: Axial CT images were obtained of the head without contrast administration.  Coronal reconstructions were performed.  Automated exposure control and iterative reconstruction methods were used.    Findings: Exam limited by patient motion throughout the study. Gray-white matter differentiation is maintained without evidence of an  acute infarction. No intracranial mass or mass effect. No extra-axial mass or collection. The ventricles and sulci are   normal in size and configuration. The posterior fossa appears normal. Sellar and suprasellar structures are normal.    Orbital and paranasal soft tissues are normal. The paranasal sinuses, ethmoid air cells, and mastoid air cells are aerated. The bony calvarium appears intact. No acute fractures. No lytic or blastic bony diseases. Cerumen within the external acoustic   canals.      Impression:      Impression: No acute intracranial pathology. Examination limited due to patient motion.          Electronically Signed: Davis Hassan MD    4/17/2024 3:12 PM EDT    Workstation ID: [id number]    XR Chest 1 View [[id number]] Collected: 04/17/24 1407     Updated: 04/17/24 1410    Narrative:      XR CHEST 1 VW    Date of Exam: 4/17/2024 2:04 PM EDT    Indication: AMS Protocol  AMS Protocol    Comparison: None available.    Findings:  No acute airspace disease. Heart size is within normal limits. No pleural effusion or pneumothorax or acute osseous abnormality      Impression:      Impression:  No acute chest finding.      Electronically Signed: Vane Silva MD    4/17/2024 2:07 PM EDT    Workstation ID: [id number]            ASSESSMENT:  Nausea and vomiting with reported hematemesis  Elevated LFTs -likely multifactorial  Rhabdomyolysis  Suicide attempt with overdose  Delirium  Hypokalemia    PLAN:  Patient is a 38-year-old male who presents after being found unresponsive at home after a suicide attempt with overdose of Benadryl, NyQuil and DayQuil.  GI asked consult for nausea and vomiting with a hematemesis episode apparently witnessed in the ER.  Nurse reports no bowel movement since admission and no further vomiting overnight.    Hemoglobin 12.8 this morning which is stable from midnight last night.  BUN 9.  Would continue twice daily PPI and we will hold off on endoscopic evaluation unless overt bleeding  recurs or significant drop in hemoglobin.  Elevated LFTs noted, likely multifactorial with rhabdo and possible Tylenol overdose.  Tylenol level negative on admission.  Start Acetadote per protocol.  Continue supportive care.    I discussed the patients findings and my recommendations with the patient.    We appreciate the referral    Electronically signed by DHARMESH Braun, 04/18/24, 10:20 AM EDT.

## 2024-04-18 NOTE — CASE MANAGEMENT/SOCIAL WORK
Discharge Planning Assessment   Sami     Patient Name: Keagan Valenzuela  MRN: [id number]  Today's Date: 4/18/2024    Admit Date: 4/17/2024    Plan: Plan to return home alone, pending clinical course, can stay with mother if need.   Discharge Needs Assessment       Row Name 04/18/24 1056       Living Environment    People in Home alone    Current Living Arrangements apartment    Potentially Unsafe Housing Conditions none    In the past 12 months has the electric, gas, oil, or water company threatened to shut off services in your home? No    Primary Care Provided by self    Provides Primary Care For no one    Family Caregiver if Needed child(hemant), adult    Family Caregiver Names Parents - Dewayne/Jo Ann ()    Quality of Family Relationships helpful;involved;supportive    Able to Return to Prior Arrangements yes    Living Arrangement Comments fernando lives alone in apartment but can stay with mother if need       Resource/Environmental Concerns    Resource/Environmental Concerns none    Transportation Concerns none       Transportation Needs    In the past 12 months, has lack of transportation kept you from medical appointments or from getting medications? no    In the past 12 months, has lack of transportation kept you from meetings, work, or from getting things needed for daily living? No       Food Insecurity    Within the past 12 months, you worried that your food would run out before you got the money to buy more. Never true    Within the past 12 months, the food you bought just didn't last and you didn't have money to get more. Never true       Transition Planning    Patient/Family Anticipates Transition to home    Patient/Family Anticipated Services at Transition none    Transportation Anticipated car, drives self;family or friend will provide       Discharge Needs Assessment    Readmission Within the Last 30 Days no previous admission in last 30 days    Equipment Currently Used at Home  none    Concerns to be Addressed discharge planning    Anticipated Changes Related to Illness none    Equipment Needed After Discharge none                   Discharge Plan       Row Name 04/18/24 1058       Plan    Plan Plan to return home alone, pending clinical course, can stay with mother if need.    Patient/Family in Agreement with Plan yes    Plan Comments CM met with father at bedside, patient unable to answer questions - not oriented or verbal at this time.  Patient lives in apartment alone.  Parents are  but are available if need, can stay with mother if need.  Family will transport at discharge. Patient performs IADLs. PCP and pharmacy confirmed. Agreeable to M2B.  Denies financial assistance needs for medication and/or food. Denies any current DME, HH, Caregiver, or rehab services. DC Barriers:  sitter, confused, FC, 2L NC, IVF, Poison control following, Psych consulted/pending eval.               Expected Discharge Date and Time       Expected Discharge Date Expected Discharge Time    Apr 19, 2024            Demographic Summary       Row Name 04/18/24 1056       General Information    Arrived From emergency department    Referral Source admission list    Reason for Consult discharge planning    Preferred Language English                   Functional Status       Row Name 04/18/24 1056       Functional Status    Usual Activity Tolerance excellent    Current Activity Tolerance fair       Functional Status, IADL    Medications independent    Meal Preparation independent    Housekeeping independent    Laundry independent    Shopping independent       Mental Status    General Appearance WDL WDL       Mental Status Summary    Recent Changes in Mental Status/Cognitive Functioning no changes           AMPARO Fleming RN  SIPS/ICU   O: [phone]  C: [phone]  [email or web address]

## 2024-04-18 NOTE — ED NOTES
Pt becoming extremely agitated; pt was previously redirectable; pt is now not following commands; pt has 3 family members at bedside; pt was gripping parents arm and would not let go for multiple minutes; provider stated they would be here soon to evaluate pt

## 2024-04-18 NOTE — ED NOTES
SI precautions ordered, unnecessary cords and wires removed, all ligature risks removed. Pt connected to the heart monitor, oxygen sensor and blood pressure cuff.sitter at bedside. Provider at bedside, provider wants patient in SI precautions due to him being unable to answer.

## 2024-04-18 NOTE — SIGNIFICANT NOTE
Violent or Self-Destructive Behavioral Restraint Provider Face to Face Evaluation      Patient Name: Keagan Valenzuela  : 1985  MRN: [id number]  Date: 2024    Face to Face Evaluation:   I have completed a face to face evaluation of the patient for dangerous or threatening behavior within 1 hour of the restraint placement.     Time restraints were placed:     Time patient was evaluated:     Assessment of Patient's Immediate Situation: Aggressive, Assaultive, and Self Harm    Assessment of Patient's Reaction to Intervention: Patient's behaviors have lessened but are still present.     Evaluation of Patient's Medical and Behavioral Conditions That Could Impact Impact the Response to Interventions:  Behavioral Assessment: Altered Mental Status, Delirium, and Psychosis    I have reviewed all available relevant medical and behavioral history for the patient including:     Complete Review of Systems: Yes  Review of Patient's Medical History: Yes  Drugs and/or Alcohol: Yes  Medications: Yes  Recent Labs and Diagnostics: Yes  Allergies: Yes    Restraints/Seclusion Continued or Discontinued: Continue    Discontinued Indications: Calm and Cooperative      Brenden Weiss DO  2024   22:37 EDT

## 2024-04-18 NOTE — PLAN OF CARE
Goal Outcome Evaluation:      New admit from ED for PCU overflow. Medicated x2 with ativan, see MAR. De-escalated violent restraints with vest to x4 soft restraints at 2300. X4 soft wrist restraints maintained for pt safety, puling lines, tubes and equipment. No s/s pain/discomfort at this time. VSS.

## 2024-04-18 NOTE — CONSULTS
NEPHROLOGY CONSULTATION-----[facility]    [facility]  [phone]  Maikol Mclean MD    Patient Care Team:  Keri Shrestha APRN as PCP - General (Nurse Practitioner)  Maikol Mclean MD as Consulting Physician (Nephrology)    CC/REASON FOR CONSULTATION: Elevated creatinine/rhabdomyolysis    PHYSICIAN REQUESTING CONSULTATION: Dr. Eugene    History of Present Illness  37-year-old male with no significant past medical history brought in after he was found unconscious at home.  He is admitted with multidrug overdose.  Mostly Benadryl, NyQuil, DayQuil.  His creatinine was 1.2 on admission.  He CK was 13,000, increased to greater than 20,000 today.  His current Steiner catheter and making urine.  No chest pain or shortness of air.  No gross hematuria.  CT abdomen negative for obstructive uropathy.    Review of Systems   As noted above otherwise 10 systems reviewed and were negative.    History reviewed. No pertinent past medical history.    History reviewed. No pertinent surgical history.    Family History   Problem Relation Age of Onset    No Known Problems Mother     No Known Problems Father        Social History     Tobacco Use    Smoking status: Every Day     Current packs/day: 0.50     Types: Cigarettes    Smokeless tobacco: Never   Vaping Use    Vaping status: Never Used   Substance Use Topics    Alcohol use: Yes     Comment: very little    Drug use: Defer       Home Meds:   No medications prior to admission.       Scheduled Meds:  cefTRIAXone, 1,000 mg, Intravenous, Q24H  pantoprazole, 40 mg, Intravenous, Q12H  sodium chloride, 10 mL, Intravenous, Q12H        Continuous Infusions:  dextrose 5 % and sodium chloride 0.9 %, 150 mL/hr  sodium chloride, 150 mL/hr, Last Rate: 150 mL/hr (04/17/24 2432)        PRN Meds:    senna-docusate sodium **AND** polyethylene glycol **AND** bisacodyl **AND** bisacodyl    dextrose    dextrose    glucagon (human  "recombinant)    LORazepam    LORazepam    LORazepam    ondansetron ODT **OR** ondansetron    sodium chloride    sodium chloride    sodium chloride    Allergies:  Patient has no known allergies.    OBJECTIVE    Vital Signs  Temp:  [97.2 °F (36.2 °C)-100.6 °F (38.1 °C)] 98.6 °F (37 °C)  Heart Rate:  [0-150] 84  Resp:  [16-21] 18  BP: ()/(60-92) 124/82    I/O this shift:  In: -   Out: 1250 [Urine:1250]  I/O last 3 completed shifts:  In: 838 [I.V.:838]  Out: 1750 [Urine:1750]    Physical Exam:  General Appearance: alert, appears stated age and cooperative  Head: normocephalic, without obvious abnormality and atraumatic  Eyes: conjunctivae and sclerae normal and no icterus  Neck: supple and no JVD  Lungs: clear to auscultation and respirations regular  Heart: regular rhythm & normal rate and normal S1, S2  Chest Wall: no abnormalities observed  Abdomen: normal bowel sounds and soft, nontender  Extremities: moves extremities well, no edema, no cyanosis  Skin: no bleeding, bruising or rash  Neurologic: Alert, and oriented. No focal deficits    Results Review:    I reviewed the patient's new clinical results.    WBC WBC   Date Value Ref Range Status   04/18/2024 7.73 3.40 - 10.80 10*3/mm3 Final   04/17/2024 9.60 3.40 - 10.80 10*3/mm3 Final   04/17/2024 13.31 (H) 3.40 - 10.80 10*3/mm3 Final      HGB Hemoglobin   Date Value Ref Range Status   04/18/2024 12.8 (L) 13.0 - 17.7 g/dL Final   04/17/2024 12.8 (L) 13.0 - 17.7 g/dL Final   04/17/2024 14.0 12.0 - 17.0 g/dL Final   04/17/2024 14.7 13.0 - 17.7 g/dL Final      HCT Hematocrit   Date Value Ref Range Status   04/18/2024 38.6 37.5 - 51.0 % Final   04/17/2024 37.5 37.5 - 51.0 % Final   04/17/2024 41 38 - 51 % Final   04/17/2024 40.9 37.5 - 51.0 % Final      Platelets No results found for: \"LABPLAT\"   MCV MCV   Date Value Ref Range Status   04/18/2024 89.8 79.0 - 97.0 fL Final   04/17/2024 87.4 79.0 - 97.0 fL Final   04/17/2024 84.5 79.0 - 97.0 fL Final          Sodium " "Sodium   Date Value Ref Range Status   04/18/2024 140 136 - 145 mmol/L Final   04/17/2024 140 136 - 145 mmol/L Final   04/17/2024 140 136 - 145 mmol/L Final      Potassium Potassium   Date Value Ref Range Status   04/18/2024 3.8 3.5 - 5.2 mmol/L Final   04/17/2024 4.3 3.5 - 5.2 mmol/L Final     Comment:     Slight hemolysis detected by analyzer. Result may be falsely elevated.  Result checked     04/17/2024 3.4 (L) 3.5 - 5.2 mmol/L Final     Comment:     Slight hemolysis detected by analyzer. Result may be falsely elevated.      Chloride Chloride   Date Value Ref Range Status   04/18/2024 108 (H) 98 - 107 mmol/L Final   04/17/2024 106 98 - 107 mmol/L Final   04/17/2024 99 98 - 107 mmol/L Final      CO2 CO2   Date Value Ref Range Status   04/18/2024 26.0 22.0 - 29.0 mmol/L Final   04/17/2024 23.0 22.0 - 29.0 mmol/L Final   04/17/2024 25.0 22.0 - 29.0 mmol/L Final      BUN BUN   Date Value Ref Range Status   04/18/2024 9 6 - 20 mg/dL Final   04/17/2024 8 6 - 20 mg/dL Final   04/17/2024 10 6 - 20 mg/dL Final      Creatinine Creatinine   Date Value Ref Range Status   04/18/2024 1.14 0.76 - 1.27 mg/dL Final   04/17/2024 1.23 0.76 - 1.27 mg/dL Final   04/17/2024 1.16 0.60 - 1.30 mg/dL Final     Comment:     Serial Number: [id number]:  [id number]   04/17/2024 1.20 0.76 - 1.27 mg/dL Final      Calcium Calcium   Date Value Ref Range Status   04/18/2024 8.5 (L) 8.6 - 10.5 mg/dL Final   04/17/2024 8.7 8.6 - 10.5 mg/dL Final   04/17/2024 10.0 8.6 - 10.5 mg/dL Final      PO4 No results found for: \"CAPO4\"   Albumin Albumin   Date Value Ref Range Status   04/18/2024 3.5 3.5 - 5.2 g/dL Final   04/17/2024 3.7 3.5 - 5.2 g/dL Final   04/17/2024 4.7 3.5 - 5.2 g/dL Final      Magnesium No results found for: \"MG\"   Uric Acid No results found for: \"URICACID\"       Imaging Results (Last 72 Hours)       Procedure Component Value Units Date/Time    CT Abdomen Pelvis With Contrast [[id number]] Collected: 04/17/24 1509     Updated: 04/17/24 1520 "    Narrative:      CT ABDOMEN PELVIS W CONTRAST    Date of Exam: 4/17/2024 3:02 PM EDT    Indication: vomiting blood. AMS.    Comparison: None available.    Technique: Axial CT images were obtained of the abdomen and pelvis following the uneventful intravenous administration of iodinated contrast. Sagittal and coronal reconstructions were performed.  Automated exposure control and iterative reconstruction   methods were used.        Findings:  Layering high density material is seen within the stomach, which could represent blood products, given the history of hematemesis, or could represent ingested high density material within the gastric lumen. No gastric mass or focal gastric inflammatory   change is appreciated.    There is moderate air-fluid distention of the ascending colon and transverse colon and splenic flexure of the colon. More mild air-fluid distention is seen within the large and small bowel loops. The overall pattern is favored to represent generalized   ileus.    The appendix is filled with air and appears normal (series 4 image 87).    Small dots of air are seen along the walls of the ascending colon, thought to be within the lumen of the bowel rather than representing bowel wall pneumatosis.    No free air or free fluid or adenopathy is seen within the abdomen/pelvis.    Urinary bladder, prostate and rectum are normal.    Liver, gallbladder, spleen, pancreas, adrenals, and kidneys are within normal limits.    The lung bases are clear. The heart size is within normal limits.    No acute or suspicious osseous abnormalities are identified.      Impression:      Impression:    1. Generalized air of fluid distention of the bowel, greatest in the ascending colon and transverse colon. Findings favor the presence of diffuse ileus.  2. There is some layering high density material within the lumen of the stomach, which could represent ingested material or perhaps could represent a small amount of blood  products, given the provided history of hematemesis. No focal gastric inflammatory   change or mass lesion is identified.  3. Tiny dots of air are seen along the periphery of the ascending colon, favored represent air within the lumen of the bowel rather than that of pneumatosis. However, to be on the safe side, I would recommend short-term abdominal x-ray/KUB follow-up to   ensure stability, improvement or resolution.          Electronically Signed: Vane Silva MD    4/17/2024 3:18 PM EDT    Workstation ID: [id number]    CT Head Without Contrast [[id number]] Collected: 04/17/24 1510     Updated: 04/17/24 1514    Narrative:      CT HEAD WO CONTRAST    Date of Exam: 4/17/2024 3:02 PM EDT    Indication: AMS.    Comparison: None available.    Technique: Axial CT images were obtained of the head without contrast administration.  Coronal reconstructions were performed.  Automated exposure control and iterative reconstruction methods were used.    Findings: Exam limited by patient motion throughout the study. Gray-white matter differentiation is maintained without evidence of an acute infarction. No intracranial mass or mass effect. No extra-axial mass or collection. The ventricles and sulci are   normal in size and configuration. The posterior fossa appears normal. Sellar and suprasellar structures are normal.    Orbital and paranasal soft tissues are normal. The paranasal sinuses, ethmoid air cells, and mastoid air cells are aerated. The bony calvarium appears intact. No acute fractures. No lytic or blastic bony diseases. Cerumen within the external acoustic   canals.      Impression:      Impression: No acute intracranial pathology. Examination limited due to patient motion.          Electronically Signed: Davis Hassan MD    4/17/2024 3:12 PM EDT    Workstation ID: [id number]    XR Chest 1 View [[id number]] Collected: 04/17/24 1407     Updated: 04/17/24 1410    Narrative:      XR CHEST 1 VW    Date of Exam: 4/17/2024 2:04  PM EDT    Indication: AMS Protocol  AMS Protocol    Comparison: None available.    Findings:  No acute airspace disease. Heart size is within normal limits. No pleural effusion or pneumothorax or acute osseous abnormality      Impression:      Impression:  No acute chest finding.      Electronically Signed: Vane Silva MD    4/17/2024 2:07 PM EDT    Workstation ID: [id number]              Results for orders placed during the hospital encounter of 04/17/24    XR Chest 1 View    Narrative  XR CHEST 1 VW    Date of Exam: 4/17/2024 2:04 PM EDT    Indication: AMS Protocol  AMS Protocol    Comparison: None available.    Findings:  No acute airspace disease. Heart size is within normal limits. No pleural effusion or pneumothorax or acute osseous abnormality    Impression  Impression:  No acute chest finding.      Electronically Signed: Vane Silva MD  4/17/2024 2:07 PM EDT  Workstation ID: [id number]            ASSESSMENT / PLAN      Overdose    TYLER-likely ATN related to rhabdomyolysis  Acute rhabdomyolysis-traumatic/fall.  Found on floor.  Aggressive IV hydration.  Trend CK  Drug overdose-Benadryl/NyQuil/DayQuil  Hypokalemia on admission-resolved  Mild metabolic acidosis-resolved    Nonoliguric  Continue aggressive IV hydration with D5 normal saline  Trend CK  Monitor renal function fluid status electrolytes  Discussed with family and RN at bedside        I discussed the patient's findings and my recommendations with patient, family, and consulting provider    Will follow along closely. Thank you for allowing us to see this patient in renal consultation.    [facility]  [phone]  MD Maikol Vasquez MD  04/18/24  17:37 EDT

## 2024-04-18 NOTE — PROGRESS NOTES
[facility] MEDICINE SERVICE  DAILY PROGRESS NOTE    NAME: Keagan Valenzuela  : 1985  MRN: [id number]      LOS: 1 day     PROVIDER OF SERVICE: DHARMESH Gary    Chief Complaint: Overdose    Subjective:     Interval History:  History taken from: patient chart family  Patient seen and examined at bedside.  Patient very sleepy during exam, with minimal interaction.  Father at bedside and answered questions.    Objective:     Vital Signs  Temp:  [97.2 °F (36.2 °C)-100.6 °F (38.1 °C)] 98.2 °F (36.8 °C)  Heart Rate:  [0-150] 93  Resp:  [16-21] 18  BP: ()/(55-95) 109/75  Flow (L/min):  [2] 2   Body mass index is 18.35 kg/m².    Physical Exam  PHYSICAL EXAM  Constitutional:  Well developed, well nourished, no acute distress, non-toxic appearance   Eyes:  PERRL, conjunctiva normal, EOMI   HENT:  Atraumatic, external ears normal, nose normal, oropharynx moist, no pharyngeal exudates. Neck-normal range of motion, no tenderness, supple, trachea midline  Respiratory: CTAB, non-labored respirations without accessory muscle use  Cardiovascular:  Normal rate, normal rhythm, no murmurs, no gallops, no rubs   GI:  Soft, nondistended, normal bowel sounds, nontender, no organomegaly, no mass, no rebound, no guarding   :  No costovertebral angle tenderness   Musculoskeletal:  No edema, no tenderness, no deformities  Integument:  Well hydrated, healing wound to the lower lip  Lymphatic:  No lymphadenopathy noted   Neurologic: Nonfocal, sleepy but arousable.  Generalized tremors noted.  Psychiatric:  Speech and behavior appropriate      Scheduled Meds   cefTRIAXone, 1,000 mg, Intravenous, Q24H  heparin (porcine), 5,000 Units, Subcutaneous, Q8H  pantoprazole, 40 mg, Intravenous, Q12H  sodium chloride, 10 mL, Intravenous, Q12H       PRN Meds     senna-docusate sodium **AND** polyethylene glycol **AND** bisacodyl **AND** bisacodyl    dextrose    dextrose    glucagon (human recombinant)    LORazepam     LORazepam    LORazepam    ondansetron ODT **OR** ondansetron    sodium chloride    sodium chloride    sodium chloride   Infusions  dextrose 5 % and sodium chloride 0.9 %, 150 mL/hr  sodium chloride, 150 mL/hr, Last Rate: 150 mL/hr (04/17/24 1492)          Diagnostic Data    Results from last 7 days   Lab Units 04/18/24  0922   WBC 10*3/mm3 7.73   HEMOGLOBIN g/dL 12.8*   HEMATOCRIT % 38.6   PLATELETS 10*3/mm3 145   GLUCOSE mg/dL 88   CREATININE mg/dL 1.14   BUN mg/dL 9   SODIUM mmol/L 140   POTASSIUM mmol/L 3.8   AST (SGOT) U/L 325*   ALT (SGPT) U/L 61*   ALK PHOS U/L 61   BILIRUBIN mg/dL 0.6   ANION GAP mmol/L 6.0       CT Abdomen Pelvis With Contrast    Result Date: 4/17/2024  Impression: 1. Generalized air of fluid distention of the bowel, greatest in the ascending colon and transverse colon. Findings favor the presence of diffuse ileus. 2. There is some layering high density material within the lumen of the stomach, which could represent ingested material or perhaps could represent a small amount of blood products, given the provided history of hematemesis. No focal gastric inflammatory  change or mass lesion is identified. 3. Tiny dots of air are seen along the periphery of the ascending colon, favored represent air within the lumen of the bowel rather than that of pneumatosis. However, to be on the safe side, I would recommend short-term abdominal x-ray/KUB follow-up to ensure stability, improvement or resolution. Electronically Signed: Vane Silva MD  4/17/2024 3:18 PM EDT  Workstation ID: [id number]    CT Head Without Contrast    Result Date: 4/17/2024  Impression: No acute intracranial pathology. Examination limited due to patient motion. Electronically Signed: Davis Hassan MD  4/17/2024 3:12 PM EDT  Workstation ID: [id number]    XR Chest 1 View    Result Date: 4/17/2024  Impression: No acute chest finding. Electronically Signed: Vane Silva MD  4/17/2024 2:07 PM EDT  Workstation ID: [id number]       I  reviewed the patient's new clinical results.  I reviewed the patient's new imaging results and agree with the interpretation.    Assessment/Plan:     Active and Resolved Problems  Active Hospital Problems    Diagnosis  POA    **Overdose [T50.901A]  Yes      Resolved Hospital Problems   No resolved problems to display.       Intentional drug ingestion  - Acute aggressive events overnight,  - Patient initially placed in abdominal restraints, downgraded to soft restraints, and now patient is resting comfortably and restraints are not needed at this time.  - Continue volume resuscitation with D5 normal saline at 150 an hour  - Continue telemetry monitoring  - Neurochecks every 4 hours  - GI consult ordered  - Psychiatry consult ordered    Rhabdomyolysis  -CK increased today to greater than 20,000 on 4/18/2024  -Lactate improved, now at 1 as of 4/18/2024  - Continue aggressive fluid resuscitation    Leukocytosis  - WBC 7.73.  UA noted to have WBCs, blood, protein.  - Ceftriaxone given.    Hypokalemia  - Most recent potassium 3.8.  - Electrolyte replacement protocol if potassium is below 3.5.      Discussed plan of care with family.  Will continue to treat patient with aggressive fluid resuscitation and monitoring lab work as well as renal function.  Consulted psychiatry due to patient's intentional harm.    DVT prophylaxis:  Medical DVT prophylaxis orders are present.         Code status is   Code Status and Medical Interventions:   Ordered at: 04/17/24 1720     Code Status (Patient has no pulse and is not breathing):    CPR (Attempt to Resuscitate)     Medical Interventions (Patient has pulse or is breathing):    Full Support       Plan for disposition: 4/22/2024    Time: 30 minutes    Signature: Electronically signed by DHARMESH Gary, 04/18/24, 12:16 EDT.  [facility] Hospitalist Team      Addendum:    I saw and examined the patient in addition to the JULIA.  I agree with assessment and plan with the  following addendum:    General Appearance:  Alert, cooperative, no distress, appears stated age, lethargic but arousable  Head:  Normocephalic, without obvious abnormality, atraumatic  Eyes:  PERRL, conjunctiva/corneas clear, EOM's intact, fundi benign, both eyes  Ears:  Normal TM's and external ear canals, both ears  Nose: Nares normal, septum midline, mucosa normal, no drainage or sinus tenderness  Throat: Lips, mucosa, and tongue normal; teeth and gums normal  Neck: Supple, symmetrical, trachea midline, no adenopathy, thyroid: not enlarged, symmetric, no tenderness/mass/nodules, no carotid bruit or JVD  Lungs:   Clear to auscultation bilaterally, respirations unlabored  Heart: Regular rate and rhythm, S1, S2 normal, no murmur, rub or gallop  Abdomen:  Soft, non-tender, bowel sounds active all four quadrants,  no masses, no organomegaly  Extremities: Extremities normal, atraumatic, no cyanosis or edema  Pulses: 2+ and symmetric  Skin: Skin color, texture, turgor normal, no rashes or lesions  Neurologic: Normal      Patient did not want to converse very much with me.  I spoke to the patient's father at the bedside.  We discussed that the patient had told the nurse that he was actively suicidal.  Most likely this was a true suicide attempt.  Medically he appears to be fairly stable at this time, and he has not had any further episodes of hematemesis or any withdrawal symptoms.  Acetaminophen level is undetectable.  He does have significant rhabdomyolysis, likely related to being found down and unresponsive for several hours prior to admission.  Continue IV fluids with D5 NS, advance diet as patient is more alert.  Awaiting psychiatry evaluation.  Anticipate he will likely need inpatient psychiatry given that he has had depressive symptoms for quite a while and has attempted suicide prior to presentation.  Patient is not yet medically stable for discharge.      Disposition: Anticipate discharge on 4/21      [facility]  MD Slavador EugeneGood [facility]  04/18/24  13:08 EDT

## 2024-04-18 NOTE — PAYOR COMM NOTE
"AUTHORIZATION PENDING:   PLEASE CALL OR FAX DETERMINATION TO CONTACT BELOW. THANK YOU.        Gauri Merritt RN MSN  /UR  [facility]  [phone] office  [phone] fax  [email or web address]    [facility]  NPI: [id number]  Tax: [phone]            Keagan Rubin (38 y.o. Male)       Date of Birth   1985    Social Security Number       Address   [address] [place] [address] [place] [facility]    Home Phone   [phone]    MRN   [id number]       Oriental orthodox   None    Marital Status   Single                            Admission Date   4/17/24    Admission Type   Emergency    Admitting Provider       Attending Provider   Miguel Angel Eugene MD    Department, Room/Bed   [facility] INTENSIVE CARE UNIT, 2316/1       Discharge Date       Discharge Disposition       Discharge Destination                                 Attending Provider: Miguel Angel Eugene MD    Allergies: No Known Allergies    Isolation: None   Infection: None   Code Status: CPR    Ht: 177.8 cm (70\")   Wt: 58 kg (127 lb 13.9 oz)    Admission Cmt: None   Principal Problem: Overdose [T50.901A]                   Active Insurance as of 4/17/2024       Primary Coverage       Payor Plan Insurance Group Employer/Plan Group    [facility] Code Fever [facility] BLUE CROSS BLUE [facility] PPO [id number]       Payor Plan Address Payor Plan Phone Number Payor Plan Fax Number Effective Dates    [address] [phone]  1/1/2022 - None Entered    [place] [address]         Subscriber Name Subscriber Birth Date Member ID       KEAGAN RUBIN 1985 [id number]                     Emergency Contacts        (Rel.) Home Phone Work Phone Mobile Phone    PILLO RUBIN (Father) -- -- [phone]    shad rubin (Mother) -- -- [phone]          04/17/24 1716  Inpatient Admission  Once     Completed     Level of Care: Telemetry  Diagnosis: Overdose [202577]  Admitting " Physician: ANITRA BRYAN [[id number]]  Attending Physician: ANITRA BRYAN [[id number]]  Certification: I Certify That Inpatient Hospital Services Are Medically Necessary For Greater Than 2 Midnights             History & Physical        Anitra Bryan MD at 24 1721              [facility] Medicine Services  History & Physical    Patient Name: Keagan Valenzuela  : 1985  MRN: [id number]  Primary Care Physician:  Keri Shrestha APRN  Date of admission: 2024  Date and Time of Service: 2024    Subjective      Chief Complaint: Unresponsive    History of Present Illness:   This is a 37 years old male with no significant past medical history who was brought in after he was found unconscious at home.  Per family he has been acting strange and isolating himself, looks depressed and was recently seen in the ED, he was discharged home and instructed to stay away from Benadryl and NyQuil.  Given his family did not hear from him today EMS was called, his stool was broken and patient was followed unresponsive on the floor with empty bottles of Benadryl, NyQuil, DayQuil and he was then brought to the ED for further workup and management    Vital signs on presentation showed a blood pressure of 135/95, afebrile, respiratory rate 16 saturating 97% on room air with a heart rate of 100.     Labs showed creatinine kinase 13,383, sodium 140, potassium 3.4, bicarb 25, anion gap 16, WBC 13.3 otherwise CBC within normal limit, urinalysis was positive for WBC and protein, urine culture was negative.    Chest x-ray showed no acute chest finding  CT abdomen and pelvis showed generalized air of fluid distention of the bowel, greatest in the ascending colon and transverse colon. Findings favor the presence of diffuse ileus.  There is some layering high density material within the lumen of the stomach, which could represent ingested material or perhaps could represent a small amount of blood products,  given the provided history of hematemesis. Tiny dots of air are seen along the periphery of the ascending colon, favored represent air within the lumen of the bowel rather than that of pneumatosis.       eReview of Systems    Personal History     No past medical history on file.    No past surgical history on file.    Family History: family history includes No Known Problems in his father and mother. Otherwise pertinent FHx was reviewed and not pertinent to current issue.    Social History:  reports that he has been smoking cigarettes. He has never used smokeless tobacco. He reports current alcohol use. Drug use questions deferred to the physician.    Home Medications:  Prior to Admission Medications       None              Allergies:  No Known Allergies    Objective      Vitals:   Temp:  [98.5 °F (36.9 °C)] 98.5 °F (36.9 °C)  Heart Rate:  [100-113] 100  Resp:  [16] 16  BP: (114-155)/(55-95) 135/95  Body mass index is 18.35 kg/m².  Physical Exam    Appearance: No apparent distress, non-toxic appearing   HEENT/Neck: Neck is supple, Extraocular movements intact,   Cardiovascular: Tachycardic with regular rhythm  Pulmonary: Clear to auscultation Bilaterally.   Abdomen: BS+, Soft, non-tender, non-distended.   Ext: No Cyanosis, Clubbing, Edema.   Neuro: Non-focal, Alert & Oriented x 3          Diagnostic Data:  Lab Results (last 24 hours)       Procedure Component Value Units Date/Time    Urine Drug Screen - Urine, Clean Catch [[id number]]  (Normal) Collected: 04/17/24 1416    Specimen: Urine, Clean Catch Updated: 04/17/24 1609     Amphet/Methamphet, Screen Negative     Barbiturates Screen, Urine Negative     Benzodiazepine Screen, Urine Negative     Cocaine Screen, Urine Negative     Opiate Screen Negative     THC, Screen, Urine Negative     Methadone Screen, Urine Negative     Oxycodone Screen, Urine Negative    Narrative:      Negative Thresholds Per Drugs Screened:    Amphetamines                 500  ng/ml  Barbiturates                 200 ng/ml  Benzodiazepines              100 ng/ml  Cocaine                      300 ng/ml  Methadone                    300 ng/ml  Opiates                      300 ng/ml  Oxycodone                    100 ng/ml  THC                           50 ng/ml    The Normal Value for all drugs tested is negative. This report includes final unconfirmed screening results to be used for medical treatment purposes only. Unconfirmed results must not be used for non-medical purposes such as employment or legal testing. Clinical consideration should be applied to any drug of abuse test, particularly when unconfirmed results are used.          All urine drugs of abuse requests without chain of custody are for medical screening purposes only.  False positives are possible.      Urinalysis, Microscopic Only - Indwelling Urethral Catheter [[id number]]  (Abnormal) Collected: 04/17/24 1416    Specimen: Urine from Indwelling Urethral Catheter Updated: 04/17/24 1520     RBC, UA 0-2 /HPF      WBC, UA 3-5 /HPF      Bacteria, UA None Seen /HPF      Squamous Epithelial Cells, UA 0-2 /HPF      Hyaline Casts, UA 7-12 /LPF      Fine Granular Casts, UA 0-2 /LPF      WBC Clumps, UA Small/1+ /HPF      Methodology Manual Light Microscopy    Girard Draw [[id number]] Collected: 04/17/24 1405    Specimen: Blood Updated: 04/17/24 1515    Narrative:      The following orders were created for panel order Girard Draw.  Procedure                               Abnormality         Status                     ---------                               -----------         ------                     Green Top (Gel)[[id number]]                                  Final result               Lavender Top[[id number]]                                     Final result               Gold Top - SST[[id number]]                                   Final result               Light Blue Top[[id number]]                                   Final result                  Please view results for these tests on the individual orders.    Green Top (Gel) [[id number]] Collected: 04/17/24 1405    Specimen: Blood Updated: 04/17/24 1515     Extra Tube Hold for add-ons.     Comment: Auto resulted.       Lavender Top [[id number]] Collected: 04/17/24 1405    Specimen: Blood Updated: 04/17/24 1515     Extra Tube hold for add-on     Comment: Auto resulted       Light Blue Top [[id number]] Collected: 04/17/24 1405    Specimen: Blood Updated: 04/17/24 1515     Extra Tube Hold for add-ons.     Comment: Auto resulted       CK [[id number]]  (Abnormal) Collected: 04/17/24 1405    Specimen: Blood Updated: 04/17/24 1446     Creatine Kinase 13,383 U/L     Ethanol [[id number]] Collected: 04/17/24 1405    Specimen: Blood Updated: 04/17/24 1444     Ethanol % <0.010 %     Narrative:      Plasma Ethanol Clinical Symptoms:    ETOH (%)               Clinical Symptom  .01-.05              No apparent influence  .03-.12              Euphoria, Diminished judgment and attention   .09-.25              Impaired comprehension, Muscle incoordination  .18-.30              Confusion, Staggered gait, Slurred speech  .25-.40              Markedly decreased response to stimuli, unable to stand or                        walk, vomitting, sleep or stupor  .35-.50              Comatose, Anesthesia, Subnormal body temperature        Gold Top - SST [[id number]] Collected: 04/17/24 1405    Specimen: Blood Updated: 04/17/24 1439     Extra Tube hold    POC Glucose Once [[id number]]  (Normal) Collected: 04/17/24 1435    Specimen: Blood Updated: 04/17/24 1436     Glucose 100 mg/dL      Comment: Serial Number: [id number]:  [id number]       Comprehensive Metabolic Panel [[id number]]  (Abnormal) Collected: 04/17/24 1405    Specimen: Blood Updated: 04/17/24 1434     Glucose 92 mg/dL      BUN 10 mg/dL      Creatinine 1.20 mg/dL      Sodium 140 mmol/L      Potassium 3.4 mmol/L      Comment: Slight hemolysis detected by analyzer.  Result may be falsely elevated.        Chloride 99 mmol/L      CO2 25.0 mmol/L      Calcium 10.0 mg/dL      Total Protein 7.1 g/dL      Albumin 4.7 g/dL      ALT (SGPT) 31 U/L      AST (SGOT) 122 U/L      Alkaline Phosphatase 79 U/L      Total Bilirubin 0.6 mg/dL      Globulin 2.4 gm/dL      A/G Ratio 2.0 g/dL      BUN/Creatinine Ratio 8.3     Anion Gap 16.0 mmol/L      eGFR 78.9 mL/min/1.73     Narrative:      GFR Normal >60  Chronic Kidney Disease <60  Kidney Failure <15      Urinalysis With Microscopic If Indicated (No Culture) - Indwelling Urethral Catheter [[id number]]  (Abnormal) Collected: 04/17/24 1416    Specimen: Urine from Indwelling Urethral Catheter Updated: 04/17/24 1431     Color, UA Yellow     Appearance, UA Clear     pH, UA 5.5     Specific Gravity, UA 1.020     Glucose, UA Negative     Ketones, UA Negative     Bilirubin, UA Negative     Blood, UA Large (3+)     Protein, UA 30 mg/dL (1+)     Leuk Esterase, UA Negative     Nitrite, UA Negative     Urobilinogen, UA 1.0 E.U./dL    CBC & Differential [[id number]]  (Abnormal) Collected: 04/17/24 1405    Specimen: Blood Updated: 04/17/24 1412    Narrative:      The following orders were created for panel order CBC & Differential.  Procedure                               Abnormality         Status                     ---------                               -----------         ------                     CBC Auto Differential[[id number]]        Abnormal            Final result                 Please view results for these tests on the individual orders.    CBC Auto Differential [[id number]]  (Abnormal) Collected: 04/17/24 1405    Specimen: Blood Updated: 04/17/24 1412     WBC 13.31 10*3/mm3      RBC 4.84 10*6/mm3      Hemoglobin 14.7 g/dL      Hematocrit 40.9 %      MCV 84.5 fL      MCH 30.4 pg      MCHC 35.9 g/dL      RDW 12.5 %      RDW-SD 38.2 fl      MPV 11.0 fL      Platelets 213 10*3/mm3      Neutrophil % 83.7 %      Lymphocyte % 11.2 %      Monocyte % 4.5  %      Eosinophil % 0.1 %      Basophil % 0.1 %      Immature Grans % 0.4 %      Neutrophils, Absolute 11.15 10*3/mm3      Lymphocytes, Absolute 1.49 10*3/mm3      Monocytes, Absolute 0.60 10*3/mm3      Eosinophils, Absolute 0.01 10*3/mm3      Basophils, Absolute 0.01 10*3/mm3      Immature Grans, Absolute 0.05 10*3/mm3      nRBC 0.0 /100 WBC              Imaging Results (Last 24 Hours)       Procedure Component Value Units Date/Time    CT Abdomen Pelvis With Contrast [[id number]] Collected: 04/17/24 1509     Updated: 04/17/24 1520    Narrative:      CT ABDOMEN PELVIS W CONTRAST    Date of Exam: 4/17/2024 3:02 PM EDT    Indication: vomiting blood. AMS.    Comparison: None available.    Technique: Axial CT images were obtained of the abdomen and pelvis following the uneventful intravenous administration of iodinated contrast. Sagittal and coronal reconstructions were performed.  Automated exposure control and iterative reconstruction   methods were used.        Findings:  Layering high density material is seen within the stomach, which could represent blood products, given the history of hematemesis, or could represent ingested high density material within the gastric lumen. No gastric mass or focal gastric inflammatory   change is appreciated.    There is moderate air-fluid distention of the ascending colon and transverse colon and splenic flexure of the colon. More mild air-fluid distention is seen within the large and small bowel loops. The overall pattern is favored to represent generalized   ileus.    The appendix is filled with air and appears normal (series 4 image 87).    Small dots of air are seen along the walls of the ascending colon, thought to be within the lumen of the bowel rather than representing bowel wall pneumatosis.    No free air or free fluid or adenopathy is seen within the abdomen/pelvis.    Urinary bladder, prostate and rectum are normal.    Liver, gallbladder, spleen, pancreas, adrenals, and  kidneys are within normal limits.    The lung bases are clear. The heart size is within normal limits.    No acute or suspicious osseous abnormalities are identified.      Impression:      Impression:    1. Generalized air of fluid distention of the bowel, greatest in the ascending colon and transverse colon. Findings favor the presence of diffuse ileus.  2. There is some layering high density material within the lumen of the stomach, which could represent ingested material or perhaps could represent a small amount of blood products, given the provided history of hematemesis. No focal gastric inflammatory   change or mass lesion is identified.  3. Tiny dots of air are seen along the periphery of the ascending colon, favored represent air within the lumen of the bowel rather than that of pneumatosis. However, to be on the safe side, I would recommend short-term abdominal x-ray/KUB follow-up to   ensure stability, improvement or resolution.          Electronically Signed: Vane Silva MD    4/17/2024 3:18 PM EDT    Workstation ID: [id number]    CT Head Without Contrast [[id number]] Collected: 04/17/24 1510     Updated: 04/17/24 1514    Narrative:      CT HEAD WO CONTRAST    Date of Exam: 4/17/2024 3:02 PM EDT    Indication: AMS.    Comparison: None available.    Technique: Axial CT images were obtained of the head without contrast administration.  Coronal reconstructions were performed.  Automated exposure control and iterative reconstruction methods were used.    Findings: Exam limited by patient motion throughout the study. Gray-white matter differentiation is maintained without evidence of an acute infarction. No intracranial mass or mass effect. No extra-axial mass or collection. The ventricles and sulci are   normal in size and configuration. The posterior fossa appears normal. Sellar and suprasellar structures are normal.    Orbital and paranasal soft tissues are normal. The paranasal sinuses, ethmoid air cells,  and mastoid air cells are aerated. The bony calvarium appears intact. No acute fractures. No lytic or blastic bony diseases. Cerumen within the external acoustic   canals.      Impression:      Impression: No acute intracranial pathology. Examination limited due to patient motion.          Electronically Signed: Davis Hassan MD    4/17/2024 3:12 PM EDT    Workstation ID: [id number]    XR Chest 1 View [[id number]] Collected: 04/17/24 1407     Updated: 04/17/24 1410    Narrative:      XR CHEST 1 VW    Date of Exam: 4/17/2024 2:04 PM EDT    Indication: AMS Protocol  AMS Protocol    Comparison: None available.    Findings:  No acute airspace disease. Heart size is within normal limits. No pleural effusion or pneumothorax or acute osseous abnormality      Impression:      Impression:  No acute chest finding.      Electronically Signed: Vane Silva MD    4/17/2024 2:07 PM EDT    Workstation ID: [id number]              Assessment & Plan    Drug overdose  -Family found empty bottles of Benadryl, NyQuil, DayQuil while patient was found unconscious.  -He is awake now, still confused and mildly tachycardic.  -? hematemesis on presentation.  -Continue to monitor patient on telemetry, continue aggressive volume resuscitation  -Gastroenterology has been consulted.        Rhabdomyolysis  -Rhabdomyolysis likely from laying on the floor, it is unknown how long he was down.    -Continue volume resuscitation and trend creatinine kinase.  -continue to monitor       Leukocytosis  -Mild leukocytosis, with WBC on urinalysis, could be hemoconcentration however will cover with ceftriaxone for now until patient is more alert to answer questions.      Hypokalemia  -Replete with 40 mEq of potassium chloride x 1          DVT prophylaxis:  Full code  Continue inpatient level care  Medical DVT prophylaxis orders are signed and held.            Signature:     This document has been electronically signed by Anitra Martino MD on April 17, 2024  17:21 EDT   [facility] [place]    Electronically signed by Anitra Martino MD at 04/17/24 1750          Emergency Department Notes        Alexandra Sr, RN at 04/17/24 2120          SI precautions ordered, unnecessary cords and wires removed, all ligature risks removed. Pt connected to the heart monitor, oxygen sensor and blood pressure cuff.sitter at bedside. Provider at bedside, provider wants patient in SI precautions due to him being unable to answer.    Electronically signed by Alexandra Sr, DANY at 04/18/24 0153       Carissa Lester, RN at 04/17/24 2037          Provider APRN doing face to face with patient and restraints ordered    Electronically signed by Carissa Lester RN at 04/17/24 2037       Alexandra Sr RN at 04/17/24 2030          Poison control called and spoke with Consuelo NP    Electronically signed by Alexandra Sr RN at 04/18/24 0154       Carmen Kaufman RN at 04/17/24 2030          Provider contacted that pt becoming more agitated; security at bedside    Electronically signed by Carmen Kaufman RN at 04/17/24 2129       Carmen Kaufman RN at 04/17/24 2014          Pt becoming extremely agitated; pt was previously redirectable; pt is now not following commands; pt has 3 family members at bedside; pt was gripping parents arm and would not let go for multiple minutes; provider stated they would be here soon to evaluate pt    Electronically signed by Carmen Kaufman RN at 04/17/24 2016       Carmen Kaufman RN at 04/17/24 1826          Pt provided urinal; pt sitting in bed at this time; family at bedside    Electronically signed by Carmen Kaufman RN at 04/17/24 1826       Carmen Kaufman RN at 04/17/24 1710          Pt has family at bedside; provider notified that pt would like some water    Electronically signed by Carmen Kaufman RN at 04/17/24 1827       Aretha Up PA-C at 04/17/24 1631       Attestation signed by Dion Beltran MD at 04/17/24 2149        SHARED APC NON FACE TO  FACE: I performed a substantive part of the MDM during the patient's E/M visit. I personally made or approved the documented management plan and acknowledge its risk of complications.   Dion Beltran MD 4/17/2024 21:49 EDT                         Subjective   History of Present Illness  Patient is a 38-year-old male who presents today with confusion.  His mother is at bedside and reportedly called EMS and police for a wellness check.  They had to break down the door to get into the home.  Because he was unresponsive on the floor.  He had vomited on the floor gaudencio blood.        Review of Systems   Unable to perform ROS: Mental status change       No past medical history on file.    No Known Allergies    No past surgical history on file.    Family History   Problem Relation Age of Onset    No Known Problems Mother     No Known Problems Father        Social History     Socioeconomic History    Marital status: Single   Tobacco Use    Smoking status: Every Day     Current packs/day: 0.50     Types: Cigarettes    Smokeless tobacco: Never   Substance and Sexual Activity    Alcohol use: Yes     Comment: very little    Drug use: Defer    Sexual activity: Defer           Objective   Physical Exam  Constitutional:       Appearance: Normal appearance. He is well-developed. He is ill-appearing and toxic-appearing.   HENT:      Head: Normocephalic and atraumatic.      Nose: Nose normal.   Eyes:      Conjunctiva/sclera: Conjunctivae normal.      Pupils:      Right eye: Pupil is round and reactive.      Left eye: Pupil is not reactive. Pupil is round.      Comments: Pupils equal and sluggish.   Cardiovascular:      Rate and Rhythm: Normal rate and regular rhythm.   Pulmonary:      Effort: Pulmonary effort is normal. No respiratory distress.      Breath sounds: Normal breath sounds. No stridor. No wheezing, rhonchi or rales.   Musculoskeletal:         General: Normal range of motion.      Cervical back: Normal range of motion.  "  Skin:     General: Skin is warm and dry.   Neurological:      General: No focal deficit present.      Mental Status: He is disoriented and confused.   Psychiatric:         Mood and Affect: Mood is anxious.         Thought Content: Thought content normal.         Judgment: Judgment normal.         Procedures          ED Course  ED Course as of 04/17/24 1633   Wed Apr 17, 2024   1611 EKG interpreted by ER physician reviewed by myself.  Sinus tachycardia rate of 115 PVC present. [MG]      ED Course User Index  [MG] Aretha Up PA-C                                             Medical Decision Making  Appropriate PPE worn during exam.    /95   Pulse 100   Temp 98.5 °F (36.9 °C) (Rectal)   Resp 16   Ht 177.8 cm (70\")   Wt 58 kg (127 lb 13.9 oz)   SpO2 97%   BMI 18.35 kg/m²      Co-morbidities --  has no past medical history on file.  Radiology interpretation --  X-rays reviewed by me and independently interpreted by radiologist:  CT Abdomen Pelvis With Contrast    Result Date: 4/17/2024  Impression: 1. Generalized air of fluid distention of the bowel, greatest in the ascending colon and transverse colon. Findings favor the presence of diffuse ileus. 2. There is some layering high density material within the lumen of the stomach, which could represent ingested material or perhaps could represent a small amount of blood products, given the provided history of hematemesis. No focal gastric inflammatory  change or mass lesion is identified. 3. Tiny dots of air are seen along the periphery of the ascending colon, favored represent air within the lumen of the bowel rather than that of pneumatosis. However, to be on the safe side, I would recommend short-term abdominal x-ray/KUB follow-up to ensure stability, improvement or resolution. Electronically Signed: Vane Silva MD  4/17/2024 3:18 PM EDT  Workstation ID: [id number]    CT Head Without Contrast    Result Date: 4/17/2024  Impression: No acute " intracranial pathology. Examination limited due to patient motion. Electronically Signed: Davis Hassan MD  4/17/2024 3:12 PM EDT  Workstation ID: [id number]    XR Chest 1 View    Result Date: 4/17/2024  Impression: No acute chest finding. Electronically Signed: Vane Silva MD  4/17/2024 2:07 PM EDT  Workstation ID: [id number]    Patient is a 38-year-old male who presents with confusion and altered mental status of note he was seen here 2 days ago after having an effect from Benadryl use.  He again reports that he took a lot of Benadryl to try to help him sleep.  His urine tox was negative.  Does have acute rhabdomyolysis.  He did vomit gaudencio blood.  GI consult will be obtained.  Patient was given IV fluids for rhabdomyolysis.  I did discuss with case with Dr. Beltran who recommended admittance and IV fluids and monitoring for resolution of symptoms.  CT head was negative.  He was stable with mild improvement while he was here family at bedside in agreement with plan.  I discussed the findings and recommendations with the patient who voices understanding. Stable while in the ER.     Note Disclaimer: At [facility], we believe that sharing information builds trust and better relationships. You are receiving this note because you are receiving care at [facility] or recently visited. It is possible you will see health information before a provider has talked with you about it. This kind of information can be easy to misunderstand. To help you fully understand what it means for your health, we urge you to discuss this note with your provider.        Problems Addressed:  Altered mental status, unspecified altered mental status type: complicated acute illness or injury  Anticholinergic drug overdose, undetermined intent, initial encounter: complicated acute illness or injury  Hematemesis, unspecified whether nausea present: complicated acute illness or injury  Non-traumatic rhabdomyolysis: complicated acute illness or  injury    Amount and/or Complexity of Data Reviewed  Labs: ordered.  Radiology: ordered.    Risk  Prescription drug management.  Decision regarding hospitalization.        Final diagnoses:   Altered mental status, unspecified altered mental status type   Non-traumatic rhabdomyolysis   Hematemesis, unspecified whether nausea present   Anticholinergic drug overdose, undetermined intent, initial encounter       ED Disposition  ED Disposition       ED Disposition   Decision to Admit    Condition   --    Comment   --               No follow-up provider specified.       Medication List      No changes were made to your prescriptions during this visit.            Aretha Up PA-C  04/17/24 1633      Electronically signed by Dion Beltran MD at 04/17/24 2149       Carmen Kaufman RN at 04/17/24 1603          Pt states he is feeling better; pt states he took benadryl, unknown amount; pt states he didn't drink alcohol; pt would not respond as to why he took benadryl;     Electronically signed by Carmen Kaufman RN at 04/17/24 1603       Carmen Kaufman RN at 04/17/24 1455          Pt currently in CT;     Electronically signed by Carmen Kaufman RN at 04/17/24 1455       Carmen Kaufman RN at 04/17/24 1430          Family at bedside; family states pt took OTC meds Monday due to not being able to sleep; pt denies medical history for pt    Electronically signed by Carmen Kaufman RN at 04/17/24 1455       Carmen Kaufman RN at 04/17/24 1400          Pt brought in via EMS; EMS stated multiple OTC medications observed @  alcohol in the home; pt altered, localizing with eyes, pt answering some questions asked by RN with head nods; pt unable to tell RN what he took; pt laying in bed at this time; applied to monitor; pt in gown;     Electronically signed by Carmen Kaufman RN at 04/17/24 1454          Physician Progress Notes (all)        Miguel Angel Eugene MD at 04/18/24 [facility]              [facility] MEDICINE SERVICE  DAILY PROGRESS  NOTE    NAME: Keagan Valenzuela  : 1985  MRN: [id number]      LOS: 1 day     PROVIDER OF SERVICE: DHARMESH Gary    Chief Complaint: Overdose    Subjective:     Interval History:  History taken from: patient chart family  Patient seen and examined at bedside.  Patient very sleepy during exam, with minimal interaction.  Father at bedside and answered questions.    Objective:     Vital Signs  Temp:  [97.2 °F (36.2 °C)-100.6 °F (38.1 °C)] 98.2 °F (36.8 °C)  Heart Rate:  [0-150] 93  Resp:  [16-21] 18  BP: ()/(55-95) 109/75  Flow (L/min):  [2] 2   Body mass index is 18.35 kg/m².    Physical Exam  PHYSICAL EXAM  Constitutional:  Well developed, well nourished, no acute distress, non-toxic appearance   Eyes:  PERRL, conjunctiva normal, EOMI   HENT:  Atraumatic, external ears normal, nose normal, oropharynx moist, no pharyngeal exudates. Neck-normal range of motion, no tenderness, supple, trachea midline  Respiratory: CTAB, non-labored respirations without accessory muscle use  Cardiovascular:  Normal rate, normal rhythm, no murmurs, no gallops, no rubs   GI:  Soft, nondistended, normal bowel sounds, nontender, no organomegaly, no mass, no rebound, no guarding   :  No costovertebral angle tenderness   Musculoskeletal:  No edema, no tenderness, no deformities  Integument:  Well hydrated, healing wound to the lower lip  Lymphatic:  No lymphadenopathy noted   Neurologic: Nonfocal, sleepy but arousable.  Generalized tremors noted.  Psychiatric:  Speech and behavior appropriate      Scheduled Meds   cefTRIAXone, 1,000 mg, Intravenous, Q24H  heparin (porcine), 5,000 Units, Subcutaneous, Q8H  pantoprazole, 40 mg, Intravenous, Q12H  sodium chloride, 10 mL, Intravenous, Q12H       PRN Meds     senna-docusate sodium **AND** polyethylene glycol **AND** bisacodyl **AND** bisacodyl    dextrose    dextrose    glucagon (human recombinant)    LORazepam    LORazepam    LORazepam    ondansetron ODT **OR**  ondansetron    sodium chloride    sodium chloride    sodium chloride   Infusions  dextrose 5 % and sodium chloride 0.9 %, 150 mL/hr  sodium chloride, 150 mL/hr, Last Rate: 150 mL/hr (04/17/24 7802)          Diagnostic Data    Results from last 7 days   Lab Units 04/18/24  0922   WBC 10*3/mm3 7.73   HEMOGLOBIN g/dL 12.8*   HEMATOCRIT % 38.6   PLATELETS 10*3/mm3 145   GLUCOSE mg/dL 88   CREATININE mg/dL 1.14   BUN mg/dL 9   SODIUM mmol/L 140   POTASSIUM mmol/L 3.8   AST (SGOT) U/L 325*   ALT (SGPT) U/L 61*   ALK PHOS U/L 61   BILIRUBIN mg/dL 0.6   ANION GAP mmol/L 6.0       CT Abdomen Pelvis With Contrast    Result Date: 4/17/2024  Impression: 1. Generalized air of fluid distention of the bowel, greatest in the ascending colon and transverse colon. Findings favor the presence of diffuse ileus. 2. There is some layering high density material within the lumen of the stomach, which could represent ingested material or perhaps could represent a small amount of blood products, given the provided history of hematemesis. No focal gastric inflammatory  change or mass lesion is identified. 3. Tiny dots of air are seen along the periphery of the ascending colon, favored represent air within the lumen of the bowel rather than that of pneumatosis. However, to be on the safe side, I would recommend short-term abdominal x-ray/KUB follow-up to ensure stability, improvement or resolution. Electronically Signed: Vane Silva MD  4/17/2024 3:18 PM EDT  Workstation ID: [id number]    CT Head Without Contrast    Result Date: 4/17/2024  Impression: No acute intracranial pathology. Examination limited due to patient motion. Electronically Signed: Davis Hassan MD  4/17/2024 3:12 PM EDT  Workstation ID: [id number]    XR Chest 1 View    Result Date: 4/17/2024  Impression: No acute chest finding. Electronically Signed: Vane Silva MD  4/17/2024 2:07 PM EDT  Workstation ID: [id number]       I reviewed the patient's new clinical results.  I  reviewed the patient's new imaging results and agree with the interpretation.    Assessment/Plan:     Active and Resolved Problems  Active Hospital Problems    Diagnosis  POA    **Overdose [T50.901A]  Yes      Resolved Hospital Problems   No resolved problems to display.       Intentional drug ingestion  - Acute aggressive events overnight,  - Patient initially placed in abdominal restraints, downgraded to soft restraints, and now patient is resting comfortably and restraints are not needed at this time.  - Continue volume resuscitation with D5 normal saline at 150 an hour  - Continue telemetry monitoring  - Neurochecks every 4 hours  - GI consult ordered  - Psychiatry consult ordered    Rhabdomyolysis  -CK increased today to greater than 20,000 on 4/18/2024  -Lactate improved, now at 1 as of 4/18/2024  - Continue aggressive fluid resuscitation    Leukocytosis  - WBC 7.73.  UA noted to have WBCs, blood, protein.  - Ceftriaxone given.    Hypokalemia  - Most recent potassium 3.8.  - Electrolyte replacement protocol if potassium is below 3.5.      Discussed plan of care with family.  Will continue to treat patient with aggressive fluid resuscitation and monitoring lab work as well as renal function.  Consulted psychiatry due to patient's intentional harm.    DVT prophylaxis:  Medical DVT prophylaxis orders are present.         Code status is   Code Status and Medical Interventions:   Ordered at: 04/17/24 1720     Code Status (Patient has no pulse and is not breathing):    CPR (Attempt to Resuscitate)     Medical Interventions (Patient has pulse or is breathing):    Full Support       Plan for disposition: 4/22/2024    Time: 30 minutes    Signature: Electronically signed by DHARMESH Gary, 04/18/24, 12:16 EDT.  [facility] Hospitalist Team      Addendum:    I saw and examined the patient in addition to the JULIA.  I agree with assessment and plan with the following addendum:    General Appearance:  Alert,  cooperative, no distress, appears stated age, lethargic but arousable  Head:  Normocephalic, without obvious abnormality, atraumatic  Eyes:  PERRL, conjunctiva/corneas clear, EOM's intact, fundi benign, both eyes  Ears:  Normal TM's and external ear canals, both ears  Nose: Nares normal, septum midline, mucosa normal, no drainage or sinus tenderness  Throat: Lips, mucosa, and tongue normal; teeth and gums normal  Neck: Supple, symmetrical, trachea midline, no adenopathy, thyroid: not enlarged, symmetric, no tenderness/mass/nodules, no carotid bruit or JVD  Lungs:   Clear to auscultation bilaterally, respirations unlabored  Heart: Regular rate and rhythm, S1, S2 normal, no murmur, rub or gallop  Abdomen:  Soft, non-tender, bowel sounds active all four quadrants,  no masses, no organomegaly  Extremities: Extremities normal, atraumatic, no cyanosis or edema  Pulses: 2+ and symmetric  Skin: Skin color, texture, turgor normal, no rashes or lesions  Neurologic: Normal      Patient did not want to converse very much with me.  I spoke to the patient's father at the bedside.  We discussed that the patient had told the nurse that he was actively suicidal.  Most likely this was a true suicide attempt.  Medically he appears to be fairly stable at this time, and he has not had any further episodes of hematemesis or any withdrawal symptoms.  Acetaminophen level is undetectable.  He does have significant rhabdomyolysis, likely related to being found down and unresponsive for several hours prior to admission.  Continue IV fluids with D5 NS, advance diet as patient is more alert.  Awaiting psychiatry evaluation.  Anticipate he will likely need inpatient psychiatry given that he has had depressive symptoms for quite a while and has attempted suicide prior to presentation.  Patient is not yet medically stable for discharge.      Disposition: Anticipate discharge on 4/21      Miguel Angel Eugene MD  [facility] Hospitalist Team  04/18/24  13:08  EDT            Electronically signed by Miguel Angel Eugene MD at 04/18/24 1308          Consult Notes (all)        Wendy Perea APRN at 04/18/24 1020        Consult Orders    1. Gastroenterology (on-call MD unless specified) [[id number]] ordered by Aretha Up PA-C at 04/17/24 1538                 GI CONSULT  NOTE:    Referring Provider:  Dr. Eugene    Chief complaint: Hematemesis    Subjective .  Unable to currently assess/patient sleeping    History of present illness: Keagan Valenzuela is a 38 y.o. male who presents with an intentional overdose of Benadryl, NyQuil and DayQuil for suicide attempt.  Family found him unresponsive at home.  Per report, patient had nausea and vomiting in the ER with some blood noted.  Nurse reports no bowel movement since admission.  Father reports that no vomiting has been witnessed by him.  Patient is more alert today and has been talking to the nurse.    Endo History:  None    Past Medical History:  History reviewed. No pertinent past medical history.    Past Surgical History:  History reviewed. No pertinent surgical history.    Social History:  Social History     Tobacco Use    Smoking status: Every Day     Current packs/day: 0.50     Types: Cigarettes    Smokeless tobacco: Never   Vaping Use    Vaping status: Never Used   Substance Use Topics    Alcohol use: Yes     Comment: very little    Drug use: Defer       Family History:  Family History   Problem Relation Age of Onset    No Known Problems Mother     No Known Problems Father        Medications:  No medications prior to admission.       Scheduled Meds:cefTRIAXone, 1,000 mg, Intravenous, Q24H  heparin (porcine), 5,000 Units, Subcutaneous, Q8H  pantoprazole, 40 mg, Intravenous, Q12H  sodium chloride, 10 mL, Intravenous, Q12H      Continuous Infusions:sodium chloride, 150 mL/hr, Last Rate: 150 mL/hr (04/17/24 2332)      PRN Meds:.  senna-docusate sodium **AND** polyethylene glycol **AND** bisacodyl **AND** bisacodyl     dextrose    dextrose    glucagon (human recombinant)    LORazepam    LORazepam    LORazepam    ondansetron ODT **OR** ondansetron    sodium chloride    sodium chloride    sodium chloride    ALLERGIES:  Patient has no known allergies.    ROS:  Unable to fully assess    Objective sleeping comfortably, ill-appearing but no acute distress, family at bedside, sitter in room    Vital Signs:   Vitals:    04/18/24 0600 04/18/24 0630 04/18/24 0700 04/18/24 0800   BP: 116/74 100/72 104/72 109/75   Pulse: 91 87 90 93   Resp:   18    Temp: 97.7 °F (36.5 °C) 97.9 °F (36.6 °C) 98.1 °F (36.7 °C) 98.2 °F (36.8 °C)   TempSrc:    Bladder   SpO2: 99% 99% 98% 100%   Weight:       Height:           Physical Exam:     General Appearance:  Sleeping   Head:    Normocephalic, without obvious abnormality, atraumatic   Throat:   No oral lesions, no thrush, oral mucosa moist   Lungs:     Respirations regular, even and unlabored   Chest Wall:    No abnormalities observed   Abdomen:     nondistended   Rectal:     Deferred   Extremities:   Moves all extremities,  no cyanosis       Skin:   No rash, no jaundice, normal palpation       Neurologic: Sleeping       Results Review:   I reviewed the patient's labs and imaging.  CBC    Results from last 7 days   Lab Units 04/18/24 0922 04/17/24 2318 04/17/24 2120 04/17/24 1405 04/15/24  0831   WBC 10*3/mm3 7.73 9.60  --  13.31* 7.47   HEMOGLOBIN g/dL 12.8* 12.8*  --  14.7 13.8   HEMOGLOBIN, POC g/dL  --   --  14.0  --   --    PLATELETS 10*3/mm3 145 164  --  213 176     CMP   Results from last 7 days   Lab Units 04/18/24  0922 04/17/24  2318 04/17/24  2120 04/17/24  1405 04/15/24  0831   SODIUM mmol/L 140 140  --  140 135*   POTASSIUM mmol/L 3.8 4.3  --  3.4* 3.3*   CHLORIDE mmol/L 108* 106  --  99 96*   CO2 mmol/L 26.0 23.0  --  25.0 28.0   BUN mg/dL 9 8  --  10 9   CREATININE mg/dL 1.14 1.23 1.16 1.20 1.27   GLUCOSE mg/dL 88 85  --  92 115*   ALBUMIN g/dL 3.5 3.7  --  4.7 4.2   BILIRUBIN mg/dL 0.6  "0.6  --  0.6 0.6   ALK PHOS U/L 61 63  --  79 63   AST (SGOT) U/L 325* 273*  --  122* 20   ALT (SGPT) U/L 61* 51*  --  31 11     Cr Clearance Estimated Creatinine Clearance: 71.4 mL/min (by C-G formula based on SCr of 1.14 mg/dL).  Coag     HbA1C No results found for: \"HGBA1C\"  Blood Glucose   Glucose   Date/Time Value Ref Range Status   04/18/2024 0814 184 (H) 70 - 105 mg/dL Final     Comment:     Serial Number: [id number]:  [id number]   04/18/2024 0741 69 (L) 70 - 105 mg/dL Final     Comment:     Serial Number: [id number]:  [id number]   04/17/2024 2120 107 (H) 74 - 100 mg/dL Final   04/17/2024 2120 107 (H) 74 - 100 mg/dL Final     Comment:     Serial Number: [id number]:  [id number]   04/17/2024 1435 100 70 - 105 mg/dL Final     Comment:     Serial Number: [id number]:  [id number]     Infection     UA    Results from last 7 days   Lab Units 04/17/24  1416   NITRITE UA  Negative   WBC UA /HPF 3-5*   BACTERIA UA /HPF None Seen   SQUAM EPITHEL UA /HPF 0-2     Imaging Results (Last 72 Hours)       Procedure Component Value Units Date/Time    CT Abdomen Pelvis With Contrast [[id number]] Collected: 04/17/24 1509     Updated: 04/17/24 1520    Narrative:      CT ABDOMEN PELVIS W CONTRAST    Date of Exam: 4/17/2024 3:02 PM EDT    Indication: vomiting blood. AMS.    Comparison: None available.    Technique: Axial CT images were obtained of the abdomen and pelvis following the uneventful intravenous administration of iodinated contrast. Sagittal and coronal reconstructions were performed.  Automated exposure control and iterative reconstruction   methods were used.        Findings:  Layering high density material is seen within the stomach, which could represent blood products, given the history of hematemesis, or could represent ingested high density material within the gastric lumen. No gastric mass or focal gastric inflammatory   change is appreciated.    There is moderate air-fluid distention of the " ascending colon and transverse colon and splenic flexure of the colon. More mild air-fluid distention is seen within the large and small bowel loops. The overall pattern is favored to represent generalized   ileus.    The appendix is filled with air and appears normal (series 4 image 87).    Small dots of air are seen along the walls of the ascending colon, thought to be within the lumen of the bowel rather than representing bowel wall pneumatosis.    No free air or free fluid or adenopathy is seen within the abdomen/pelvis.    Urinary bladder, prostate and rectum are normal.    Liver, gallbladder, spleen, pancreas, adrenals, and kidneys are within normal limits.    The lung bases are clear. The heart size is within normal limits.    No acute or suspicious osseous abnormalities are identified.      Impression:      Impression:    1. Generalized air of fluid distention of the bowel, greatest in the ascending colon and transverse colon. Findings favor the presence of diffuse ileus.  2. There is some layering high density material within the lumen of the stomach, which could represent ingested material or perhaps could represent a small amount of blood products, given the provided history of hematemesis. No focal gastric inflammatory   change or mass lesion is identified.  3. Tiny dots of air are seen along the periphery of the ascending colon, favored represent air within the lumen of the bowel rather than that of pneumatosis. However, to be on the safe side, I would recommend short-term abdominal x-ray/KUB follow-up to   ensure stability, improvement or resolution.          Electronically Signed: Vane Silva MD    4/17/2024 3:18 PM EDT    Workstation ID: [id number]    CT Head Without Contrast [[id number]] Collected: 04/17/24 1510     Updated: 04/17/24 1514    Narrative:      CT HEAD WO CONTRAST    Date of Exam: 4/17/2024 3:02 PM EDT    Indication: AMS.    Comparison: None available.    Technique: Axial CT images were  obtained of the head without contrast administration.  Coronal reconstructions were performed.  Automated exposure control and iterative reconstruction methods were used.    Findings: Exam limited by patient motion throughout the study. Gray-white matter differentiation is maintained without evidence of an acute infarction. No intracranial mass or mass effect. No extra-axial mass or collection. The ventricles and sulci are   normal in size and configuration. The posterior fossa appears normal. Sellar and suprasellar structures are normal.    Orbital and paranasal soft tissues are normal. The paranasal sinuses, ethmoid air cells, and mastoid air cells are aerated. The bony calvarium appears intact. No acute fractures. No lytic or blastic bony diseases. Cerumen within the external acoustic   canals.      Impression:      Impression: No acute intracranial pathology. Examination limited due to patient motion.          Electronically Signed: Davis Hassan MD    4/17/2024 3:12 PM EDT    Workstation ID: [id number]    XR Chest 1 View [[id number]] Collected: 04/17/24 1407     Updated: 04/17/24 1410    Narrative:      XR CHEST 1 VW    Date of Exam: 4/17/2024 2:04 PM EDT    Indication: AMS Protocol  AMS Protocol    Comparison: None available.    Findings:  No acute airspace disease. Heart size is within normal limits. No pleural effusion or pneumothorax or acute osseous abnormality      Impression:      Impression:  No acute chest finding.      Electronically Signed: Vane Silva MD    4/17/2024 2:07 PM EDT    Workstation ID: [id number]            ASSESSMENT:  Nausea and vomiting with reported hematemesis  Elevated LFTs -likely multifactorial  Rhabdomyolysis  Suicide attempt with overdose  Delirium  Hypokalemia    PLAN:  Patient is a 38-year-old male who presents after being found unresponsive at home after a suicide attempt with overdose of Benadryl, NyQuil and DayQuil.  GI asked consult for nausea and vomiting with a hematemesis  episode apparently witnessed in the ER.  Nurse reports no bowel movement since admission and no further vomiting overnight.    Hemoglobin 12.8 this morning which is stable from midnight last night.  BUN 9.  Would continue twice daily PPI and we will hold off on endoscopic evaluation unless overt bleeding recurs or significant drop in hemoglobin.  Elevated LFTs noted, likely multifactorial with rhabdo and possible Tylenol overdose.  Tylenol level negative on admission.  Start Acetadote per protocol.  Continue supportive care.    I discussed the patients findings and my recommendations with the patient.    We appreciate the referral    Electronically signed by DHARMESH Braun, 04/18/24, 10:20 AM EDT.                 Electronically signed by Wendy Perea APRN at 04/18/24 2257

## 2024-04-19 ENCOUNTER — INPATIENT HOSPITAL (OUTPATIENT)
Dept: URBAN - METROPOLITAN AREA HOSPITAL 84 | Facility: HOSPITAL | Age: 39
End: 2024-04-19

## 2024-04-19 DIAGNOSIS — K92.0 HEMATEMESIS: ICD-10-CM

## 2024-04-19 LAB
ALBUMIN SERPL-MCNC: 3.6 G/DL (ref 3.5–5.2)
ALBUMIN/GLOB SERPL: 1.3 G/DL
ALP SERPL-CCNC: 65 U/L (ref 39–117)
ALT SERPL W P-5'-P-CCNC: 76 U/L (ref 1–41)
ANION GAP SERPL CALCULATED.3IONS-SCNC: 9 MMOL/L (ref 5–15)
AST SERPL-CCNC: 272 U/L (ref 1–40)
BASOPHILS # BLD AUTO: 0.02 10*3/MM3 (ref 0–0.2)
BASOPHILS NFR BLD AUTO: 0.2 % (ref 0–1.5)
BILIRUB SERPL-MCNC: 0.3 MG/DL (ref 0–1.2)
BUN SERPL-MCNC: 3 MG/DL (ref 6–20)
BUN/CREAT SERPL: 3.3 (ref 7–25)
CALCIUM SPEC-SCNC: 8.8 MG/DL (ref 8.6–10.5)
CHLORIDE SERPL-SCNC: 103 MMOL/L (ref 98–107)
CK SERPL-CCNC: ABNORMAL U/L (ref 20–200)
CO2 SERPL-SCNC: 29 MMOL/L (ref 22–29)
CREAT SERPL-MCNC: 0.9 MG/DL (ref 0.76–1.27)
DEPRECATED RDW RBC AUTO: 41.1 FL (ref 37–54)
EGFRCR SERPLBLD CKD-EPI 2021: 111.4 ML/MIN/1.73
EOSINOPHIL # BLD AUTO: 0.03 10*3/MM3 (ref 0–0.4)
EOSINOPHIL NFR BLD AUTO: 0.3 % (ref 0.3–6.2)
ERYTHROCYTE [DISTWIDTH] IN BLOOD BY AUTOMATED COUNT: 12.7 % (ref 12.3–15.4)
GLOBULIN UR ELPH-MCNC: 2.7 GM/DL
GLUCOSE SERPL-MCNC: 103 MG/DL (ref 65–99)
HCT VFR BLD AUTO: 41.4 % (ref 37.5–51)
HGB BLD-MCNC: 14 G/DL (ref 13–17.7)
IMM GRANULOCYTES # BLD AUTO: 0.02 10*3/MM3 (ref 0–0.05)
IMM GRANULOCYTES NFR BLD AUTO: 0.2 % (ref 0–0.5)
LYMPHOCYTES # BLD AUTO: 0.83 10*3/MM3 (ref 0.7–3.1)
LYMPHOCYTES NFR BLD AUTO: 9.5 % (ref 19.6–45.3)
MCH RBC QN AUTO: 29.9 PG (ref 26.6–33)
MCHC RBC AUTO-ENTMCNC: 33.8 G/DL (ref 31.5–35.7)
MCV RBC AUTO: 88.5 FL (ref 79–97)
MONOCYTES # BLD AUTO: 0.4 10*3/MM3 (ref 0.1–0.9)
MONOCYTES NFR BLD AUTO: 4.6 % (ref 5–12)
NEUTROPHILS NFR BLD AUTO: 7.47 10*3/MM3 (ref 1.7–7)
NEUTROPHILS NFR BLD AUTO: 85.2 % (ref 42.7–76)
NRBC BLD AUTO-RTO: 0 /100 WBC (ref 0–0.2)
PHOSPHATE SERPL-MCNC: 2.3 MG/DL (ref 2.5–4.5)
PLATELET # BLD AUTO: 188 10*3/MM3 (ref 140–450)
PMV BLD AUTO: 11.3 FL (ref 6–12)
POTASSIUM SERPL-SCNC: 3.8 MMOL/L (ref 3.5–5.2)
PROT SERPL-MCNC: 6.3 G/DL (ref 6–8.5)
RBC # BLD AUTO: 4.68 10*6/MM3 (ref 4.14–5.8)
SODIUM SERPL-SCNC: 141 MMOL/L (ref 136–145)
WBC NRBC COR # BLD AUTO: 8.77 10*3/MM3 (ref 3.4–10.8)

## 2024-04-19 PROCEDURE — 25810000003 DEXTROSE-NACL PER 500 ML: Performed by: INTERNAL MEDICINE

## 2024-04-19 PROCEDURE — 82550 ASSAY OF CK (CPK): CPT | Performed by: INTERNAL MEDICINE

## 2024-04-19 PROCEDURE — 84100 ASSAY OF PHOSPHORUS: CPT | Performed by: INTERNAL MEDICINE

## 2024-04-19 PROCEDURE — 85025 COMPLETE CBC W/AUTO DIFF WBC: CPT | Performed by: NURSE PRACTITIONER

## 2024-04-19 PROCEDURE — 97161 PT EVAL LOW COMPLEX 20 MIN: CPT

## 2024-04-19 PROCEDURE — 80053 COMPREHEN METABOLIC PANEL: CPT | Performed by: NURSE PRACTITIONER

## 2024-04-19 PROCEDURE — 25010000002 CEFTRIAXONE PER 250 MG: Performed by: INTERNAL MEDICINE

## 2024-04-19 PROCEDURE — 99232 SBSQ HOSP IP/OBS MODERATE 35: CPT | Performed by: NURSE PRACTITIONER

## 2024-04-19 RX ORDER — SERTRALINE HYDROCHLORIDE 25 MG/1
25 TABLET, FILM COATED ORAL DAILY
Status: DISCONTINUED | OUTPATIENT
Start: 2024-04-19 | End: 2024-04-20

## 2024-04-19 RX ADMIN — SERTRALINE HYDROCHLORIDE 25 MG: 25 TABLET ORAL at 14:36

## 2024-04-19 RX ADMIN — CEFTRIAXONE 1000 MG: 1 INJECTION, POWDER, FOR SOLUTION INTRAMUSCULAR; INTRAVENOUS at 17:08

## 2024-04-19 RX ADMIN — DEXTROSE AND SODIUM CHLORIDE 150 ML/HR: 5; 900 INJECTION, SOLUTION INTRAVENOUS at 07:50

## 2024-04-19 RX ADMIN — DEXTROSE AND SODIUM CHLORIDE 150 ML/HR: 5; 900 INJECTION, SOLUTION INTRAVENOUS at 21:33

## 2024-04-19 RX ADMIN — PANTOPRAZOLE SODIUM 40 MG: 40 INJECTION, POWDER, FOR SOLUTION INTRAVENOUS at 17:08

## 2024-04-19 RX ADMIN — DEXTROSE AND SODIUM CHLORIDE 150 ML/HR: 5; 900 INJECTION, SOLUTION INTRAVENOUS at 14:30

## 2024-04-19 RX ADMIN — Medication 10 ML: at 10:46

## 2024-04-19 RX ADMIN — Medication 10 ML: at 21:33

## 2024-04-19 NOTE — PROGRESS NOTES
[facility] MEDICINE SERVICE  DAILY PROGRESS NOTE    NAME: Keagan Valenzuela  : 1985  MRN: [id number]      LOS: 2 days     PROVIDER OF SERVICE: Gerardo Toledo MD    Chief Complaint: Overdose    History of Present Illness:   This is a 37 years old male with no significant past medical history who was brought in after he was found unconscious at home.  Per family he has been acting strange and isolating himself, looks depressed and was recently seen in the ED, he was discharged home and instructed to stay away from Benadryl and NyQuil.  Given his family did not hear from him today EMS was called, his stool was broken and patient was followed unresponsive on the floor with empty bottles of Benadryl, NyQuil, DayQuil and he was then brought to the ED for further workup and management     Vital signs on presentation showed a blood pressure of 135/95, afebrile, respiratory rate 16 saturating 97% on room air with a heart rate of 100.      Labs showed creatinine kinase 13,383, sodium 140, potassium 3.4, bicarb 25, anion gap 16, WBC 13.3 otherwise CBC within normal limit, urinalysis was positive for WBC and protein, urine culture was negative.     Chest x-ray showed no acute chest finding  CT abdomen and pelvis showed generalized air of fluid distention of the bowel, greatest in the ascending colon and transverse colon. Findings favor the presence of diffuse ileus.  There is some layering high density material within the lumen of the stomach, which could represent ingested material or perhaps could represent a small amount of blood products, given the provided history of hematemesis. Tiny dots of air are seen along the periphery of the ascending colon, favored represent air within the lumen of the bowel rather than that of pneumatosis.     Subjective:     Interval History:    .  Patient remained stable overnight.  Today's lab work is pending.  Discussed with father.  Patient feels depressed.   Was admitted with suicide attempt.  Psychiatry is following    Review of Systems:   Negative except what is listed above     Objective:     Vital Signs  Temp:  [98 °F (36.7 °C)-98.8 °F (37.1 °C)] 98 °F (36.7 °C)  Heart Rate:  [] 115  Resp:  [16-21] 16  BP: (101-140)/(60-89) 128/86   Body mass index is 18.35 kg/m².    Physical Exam    General Appearance:    Alert, cooperative, in no acute distress   Head:    Normocephalic, without obvious abnormality, atraumatic   Eyes:            conjunctivae and sclerae normal, no   icterus, no pallor, corneas  clear, PERRLA   Neck:   No adenopathy, supple, trachea midline, no thyromegaly, no   carotid bruit, no JVD   Lungs:     Clear to auscultation,respirations regular, even and                  unlabored    Heart:    Regular rhythm and normal rate, normal S1 and S2, no            murmur, no gallop, no rub, no click   Abdomen:     Normal bowel sounds, no masses, no organomegaly, soft        non-tender, non-distended, no guarding, no rebound                No tenderness   Extremities:   Moves all extremities well, no edema, no cyanosis, no             redness   Lymph nodes:   No palpable adenopathy   Neurologic:   Cranial nerves 2 - 12 grossly intact, sensation intact, DTR       present and equal bilaterally       Scheduled Meds   cefTRIAXone, 1,000 mg, Intravenous, Q24H  pantoprazole, 40 mg, Intravenous, Q12H  sodium chloride, 10 mL, Intravenous, Q12H       PRN Meds     senna-docusate sodium **AND** polyethylene glycol **AND** bisacodyl **AND** bisacodyl    dextrose    dextrose    glucagon (human recombinant)    LORazepam    LORazepam    LORazepam    ondansetron ODT **OR** ondansetron    sodium chloride    sodium chloride    sodium chloride   Infusions  dextrose 5 % and sodium chloride 0.9 %, 150 mL/hr, Last Rate: 150 mL/hr (04/19/24 0000)          Diagnostic Data    Results from last 7 days   Lab Units 04/18/24  0922   WBC 10*3/mm3 7.73   HEMOGLOBIN g/dL 12.8*   HEMATOCRIT  % 38.6   PLATELETS 10*3/mm3 145   GLUCOSE mg/dL 88   CREATININE mg/dL 1.14   BUN mg/dL 9   SODIUM mmol/L 140   POTASSIUM mmol/L 3.8   AST (SGOT) U/L 325*   ALT (SGPT) U/L 61*   ALK PHOS U/L 61   BILIRUBIN mg/dL 0.6   ANION GAP mmol/L 6.0       CT Abdomen Pelvis With Contrast    Result Date: 4/17/2024  Impression: 1. Generalized air of fluid distention of the bowel, greatest in the ascending colon and transverse colon. Findings favor the presence of diffuse ileus. 2. There is some layering high density material within the lumen of the stomach, which could represent ingested material or perhaps could represent a small amount of blood products, given the provided history of hematemesis. No focal gastric inflammatory  change or mass lesion is identified. 3. Tiny dots of air are seen along the periphery of the ascending colon, favored represent air within the lumen of the bowel rather than that of pneumatosis. However, to be on the safe side, I would recommend short-term abdominal x-ray/KUB follow-up to ensure stability, improvement or resolution. Electronically Signed: Vane Silva MD  4/17/2024 3:18 PM EDT  Workstation ID: [id number]    CT Head Without Contrast    Result Date: 4/17/2024  Impression: No acute intracranial pathology. Examination limited due to patient motion. Electronically Signed: Davis Hassan MD  4/17/2024 3:12 PM EDT  Workstation ID: [id number]    XR Chest 1 View    Result Date: 4/17/2024  Impression: No acute chest finding. Electronically Signed: Vane Silva MD  4/17/2024 2:07 PM EDT  Workstation ID: [id number]       I have personally reviewed the patient's new results.     Assessment/Plan:     Active and Resolved Problems    Suicide attempt  Rhabdomyolysis  History of alcohol abuse  TYLER  Metabolic acidosis resolved    Suggestion:    4/19.  Patient is medically stable.  Psychiatry is following.  Patient will need to go to inpatient psych for suicide attempt.  Continue IV hydration and nephrology is  following.  Will discharge patient to psych unit once everything is arranged    DVT prophylaxis:  No DVT prophylaxis order currently exists.         Code status is   Code Status and Medical Interventions:   Ordered at: 04/17/24 1720     Code Status (Patient has no pulse and is not breathing):    CPR (Attempt to Resuscitate)     Medical Interventions (Patient has pulse or is breathing):    Full Support       Plan for disposition: 4/20    Time: 30 minutes    Signature: Electronically signed by Gerardo Toledo MD, 04/19/24, 12:24 EDT.  [facility] Team

## 2024-04-19 NOTE — PROGRESS NOTES
"NEPHROLOGY PROGRESS NOTE------[facility]    [facility]  [phone]  Maikol Mclean MD      Patient Care Team:  Keri Shrestha APRN as PCP - General (Nurse Practitioner)  Maikol Mclean MD as Consulting Physician (Nephrology)      Provider:  Maikol Mclean MD  Patient Name: Keagan Valenzuela  :  1985    SUBJECTIVE:  Follow-up TYLER/rhabdomyolysis  Steiner in place  Urine clear  No chest pain or shortness of breath    Medication:  cefTRIAXone, 1,000 mg, Intravenous, Q24H  pantoprazole, 40 mg, Intravenous, Q12H  sodium chloride, 10 mL, Intravenous, Q12H      dextrose 5 % and sodium chloride 0.9 %, 150 mL/hr, Last Rate: 150 mL/hr (24 0000)        OBJECTIVE    Vital Sign Min/Max for last 24 hours  Temp  Min: 98.1 °F (36.7 °C)  Max: 98.8 °F (37.1 °C)   BP  Min: 101/60  Max: 140/89   Pulse  Min: 79  Max: 103   Resp  Min: 16  Max: 21   SpO2  Min: 95 %  Max: 100 %   No data recorded   No data recorded     Flowsheet Rows      Flowsheet Row First Filed Value   Admission Height 177.8 cm (70\") Documented at 2024 1340   Admission Weight 58 kg (127 lb 13.9 oz) Documented at 2024 1340            I/O this shift:  In: 2365 [I.V.:2365]  Out: 2350 [Urine:2350]  I/O last 3 completed shifts:  In: 838 [I.V.:838]  Out: 3000 [Urine:3000]    Physical Exam:  General Appearance: alert, appears stated age and cooperative  Head: normocephalic, without obvious abnormality and atraumatic  Eyes: conjunctivae and sclerae normal and no icterus  Neck: supple and no JVD  Lungs: clear to auscultation and respirations regular  Heart: regular rhythm & normal rate and normal S1, S2  Chest: Wall no abnormalities observed  Abdomen: normal bowel sounds and soft, nontender  Extremities: moves extremities well, no edema, no cyanosis and no redness  Skin: no bleeding, bruising or rash, turgor normal, color normal and no lesions noted  Neurologic: Alert, and " "oriented. No focal deficits    Labs:    WBC WBC   Date Value Ref Range Status   04/18/2024 7.73 3.40 - 10.80 10*3/mm3 Final   04/17/2024 9.60 3.40 - 10.80 10*3/mm3 Final   04/17/2024 13.31 (H) 3.40 - 10.80 10*3/mm3 Final      HGB Hemoglobin   Date Value Ref Range Status   04/18/2024 12.8 (L) 13.0 - 17.7 g/dL Final   04/17/2024 12.8 (L) 13.0 - 17.7 g/dL Final   04/17/2024 14.0 12.0 - 17.0 g/dL Final   04/17/2024 14.7 13.0 - 17.7 g/dL Final      HCT Hematocrit   Date Value Ref Range Status   04/18/2024 38.6 37.5 - 51.0 % Final   04/17/2024 37.5 37.5 - 51.0 % Final   04/17/2024 41 38 - 51 % Final   04/17/2024 40.9 37.5 - 51.0 % Final      Platelets No results found for: \"LABPLAT\"   MCV MCV   Date Value Ref Range Status   04/18/2024 89.8 79.0 - 97.0 fL Final   04/17/2024 87.4 79.0 - 97.0 fL Final   04/17/2024 84.5 79.0 - 97.0 fL Final          Sodium Sodium   Date Value Ref Range Status   04/18/2024 140 136 - 145 mmol/L Final   04/17/2024 140 136 - 145 mmol/L Final   04/17/2024 140 136 - 145 mmol/L Final      Potassium Potassium   Date Value Ref Range Status   04/18/2024 3.8 3.5 - 5.2 mmol/L Final   04/17/2024 4.3 3.5 - 5.2 mmol/L Final     Comment:     Slight hemolysis detected by analyzer. Result may be falsely elevated.  Result checked     04/17/2024 3.4 (L) 3.5 - 5.2 mmol/L Final     Comment:     Slight hemolysis detected by analyzer. Result may be falsely elevated.      Chloride Chloride   Date Value Ref Range Status   04/18/2024 108 (H) 98 - 107 mmol/L Final   04/17/2024 106 98 - 107 mmol/L Final   04/17/2024 99 98 - 107 mmol/L Final      CO2 CO2   Date Value Ref Range Status   04/18/2024 26.0 22.0 - 29.0 mmol/L Final   04/17/2024 23.0 22.0 - 29.0 mmol/L Final   04/17/2024 25.0 22.0 - 29.0 mmol/L Final      BUN BUN   Date Value Ref Range Status   04/18/2024 9 6 - 20 mg/dL Final   04/17/2024 8 6 - 20 mg/dL Final   04/17/2024 10 6 - 20 mg/dL Final      Creatinine Creatinine   Date Value Ref Range Status   04/18/2024 " "1.14 0.76 - 1.27 mg/dL Final   04/17/2024 1.23 0.76 - 1.27 mg/dL Final   04/17/2024 1.16 0.60 - 1.30 mg/dL Final     Comment:     Serial Number: [id number]:  [id number]   04/17/2024 1.20 0.76 - 1.27 mg/dL Final      Calcium Calcium   Date Value Ref Range Status   04/18/2024 8.5 (L) 8.6 - 10.5 mg/dL Final   04/17/2024 8.7 8.6 - 10.5 mg/dL Final   04/17/2024 10.0 8.6 - 10.5 mg/dL Final      PO4 No components found for: \"PO4\"   Albumin Albumin   Date Value Ref Range Status   04/18/2024 3.5 3.5 - 5.2 g/dL Final   04/17/2024 3.7 3.5 - 5.2 g/dL Final   04/17/2024 4.7 3.5 - 5.2 g/dL Final      Magnesium No results found for: \"MG\"   Uric Acid No components found for: \"URIC ACID\"     Imaging Results (Last 72 Hours)       Procedure Component Value Units Date/Time    CT Abdomen Pelvis With Contrast [[id number]] Collected: 04/17/24 1509     Updated: 04/17/24 1520    Narrative:      CT ABDOMEN PELVIS W CONTRAST    Date of Exam: 4/17/2024 3:02 PM EDT    Indication: vomiting blood. AMS.    Comparison: None available.    Technique: Axial CT images were obtained of the abdomen and pelvis following the uneventful intravenous administration of iodinated contrast. Sagittal and coronal reconstructions were performed.  Automated exposure control and iterative reconstruction   methods were used.        Findings:  Layering high density material is seen within the stomach, which could represent blood products, given the history of hematemesis, or could represent ingested high density material within the gastric lumen. No gastric mass or focal gastric inflammatory   change is appreciated.    There is moderate air-fluid distention of the ascending colon and transverse colon and splenic flexure of the colon. More mild air-fluid distention is seen within the large and small bowel loops. The overall pattern is favored to represent generalized   ileus.    The appendix is filled with air and appears normal (series 4 image 87).    Small dots of air " are seen along the walls of the ascending colon, thought to be within the lumen of the bowel rather than representing bowel wall pneumatosis.    No free air or free fluid or adenopathy is seen within the abdomen/pelvis.    Urinary bladder, prostate and rectum are normal.    Liver, gallbladder, spleen, pancreas, adrenals, and kidneys are within normal limits.    The lung bases are clear. The heart size is within normal limits.    No acute or suspicious osseous abnormalities are identified.      Impression:      Impression:    1. Generalized air of fluid distention of the bowel, greatest in the ascending colon and transverse colon. Findings favor the presence of diffuse ileus.  2. There is some layering high density material within the lumen of the stomach, which could represent ingested material or perhaps could represent a small amount of blood products, given the provided history of hematemesis. No focal gastric inflammatory   change or mass lesion is identified.  3. Tiny dots of air are seen along the periphery of the ascending colon, favored represent air within the lumen of the bowel rather than that of pneumatosis. However, to be on the safe side, I would recommend short-term abdominal x-ray/KUB follow-up to   ensure stability, improvement or resolution.          Electronically Signed: Vane Silva MD    4/17/2024 3:18 PM EDT    Workstation ID: [id number]    CT Head Without Contrast [[id number]] Collected: 04/17/24 1510     Updated: 04/17/24 1514    Narrative:      CT HEAD WO CONTRAST    Date of Exam: 4/17/2024 3:02 PM EDT    Indication: AMS.    Comparison: None available.    Technique: Axial CT images were obtained of the head without contrast administration.  Coronal reconstructions were performed.  Automated exposure control and iterative reconstruction methods were used.    Findings: Exam limited by patient motion throughout the study. Gray-white matter differentiation is maintained without evidence of an  acute infarction. No intracranial mass or mass effect. No extra-axial mass or collection. The ventricles and sulci are   normal in size and configuration. The posterior fossa appears normal. Sellar and suprasellar structures are normal.    Orbital and paranasal soft tissues are normal. The paranasal sinuses, ethmoid air cells, and mastoid air cells are aerated. The bony calvarium appears intact. No acute fractures. No lytic or blastic bony diseases. Cerumen within the external acoustic   canals.      Impression:      Impression: No acute intracranial pathology. Examination limited due to patient motion.          Electronically Signed: Davis Hassan MD    4/17/2024 3:12 PM EDT    Workstation ID: [id number]    XR Chest 1 View [[id number]] Collected: 04/17/24 1407     Updated: 04/17/24 1410    Narrative:      XR CHEST 1 VW    Date of Exam: 4/17/2024 2:04 PM EDT    Indication: AMS Protocol  AMS Protocol    Comparison: None available.    Findings:  No acute airspace disease. Heart size is within normal limits. No pleural effusion or pneumothorax or acute osseous abnormality      Impression:      Impression:  No acute chest finding.      Electronically Signed: Vane Silva MD    4/17/2024 2:07 PM EDT    Workstation ID: [id number]            Results for orders placed during the hospital encounter of 04/17/24    XR Chest 1 View    Narrative  XR CHEST 1 VW    Date of Exam: 4/17/2024 2:04 PM EDT    Indication: AMS Protocol  AMS Protocol    Comparison: None available.    Findings:  No acute airspace disease. Heart size is within normal limits. No pleural effusion or pneumothorax or acute osseous abnormality    Impression  Impression:  No acute chest finding.      Electronically Signed: Vane Silva MD  4/17/2024 2:07 PM EDT  Workstation ID: [id number]            ASSESSMENT / PLAN      Overdose    TYLER-likely ATN related to rhabdomyolysis  Acute rhabdomyolysis-traumatic/fall.  Found on floor.  Aggressive IV hydration.  Trend  CK  Drug overdose-Benadryl/NyQuil/DayQuil  Hypokalemia on admission-resolved  Mild metabolic acidosis-resolved     labs pending  Continue IVF  Trend CK  Monitor renal function fluid status electrolytes  Discussed with family and RN at bedside        Maikol Mclean MD  [facility]  [phone]  04/19/24  06:48 EDT

## 2024-04-19 NOTE — PLAN OF CARE
Goal Outcome Evaluation:  Plan of Care Reviewed With: patient           Outcome Evaluation: Keagan Valenzuela is a 38 y.o. male who was found unconscious due to drug overdose. Pt with Rhabdo, delirium and hematemesis. At baseline, pt lives alone in an apt and reports no steps to enter.  He is typically independent with all mobility and gait without AD.  He repots no hx of falls in past 1 year.  Pt works full-time as a  and is an active .  At time of PT evaluation, he is A&O x 3 (states year as his year of birth).  He denies phyiscal pain but indicates he is in emotional pain/distress.  Pt demonstrates independent bed mobility.  He exhibits good ROM throughout with mild shakiness/tremoring throughout and decreased heel to shin and finger to nose accuracy.  He ambulates 50' with CGA but without AD with mildly flexed trunk, wide GLENN and unsteady gait with uneven jeannine and decreased speed. He is tachycardic at start of and throughout session.  He would benefit from continued PT while here but should be able to discharge without continued PT.      Anticipated Discharge Disposition (PT): home

## 2024-04-19 NOTE — PLAN OF CARE
Goal Outcome Evaluation:  Plan of Care Reviewed With: patient        Progress: no change  Outcome Evaluation: Pt downgraded from ICU this am,no complaints at this time, pt currently A&Ox4 when awake- remains drowsy, pt reminded to turn throuhgout the night, pt remains on IV fluids, F/C in place,

## 2024-04-19 NOTE — CASE MANAGEMENT/SOCIAL WORK
Social Work Assessment  [facility]     Patient Name: Keagan Valenzuela  MRN: [id number]  Today's Date: 4/19/2024    Admit Date: 4/17/2024     Discharge Plan       Row Name 04/19/24 1348       Plan    Plan Inpatient psychiatric facility    Plan Comments Sw attempted to meet with Pt at bedside after MDR rounds. Pt's father was at bedside. Pt did not want to particiapte in conversation. Pt's father stated he has battled with depression off and on since high school. Pt has never had a suicidal attempt before, this is first attempt. Sw followed back up with Umm, psych APRN @1230. Pt agreeable to Inpatient treatment but does not have a preference on facility. Sw following and will send clinicals and work on placement when medically ready to discharge.      ADEBAYO West, MSW    Phone: [phone]  Fax: [phone]  Email: [email or web address]

## 2024-04-19 NOTE — THERAPY EVALUATION
Patient Name: Keagan Valenzuela  : 1985    MRN: [id number]                              Today's Date: 2024       Admit Date: 2024    Visit Dx:     ICD-10-CM ICD-9-CM   1. Altered mental status, unspecified altered mental status type  R41.82 780.97   2. Non-traumatic rhabdomyolysis  M62.82 728.88   3. Hematemesis, unspecified whether nausea present  K92.0 578.0   4. Anticholinergic drug overdose, undetermined intent, initial encounter  T44.3X4A 971.1     E980.4     Patient Active Problem List   Diagnosis    Overdose     History reviewed. No pertinent past medical history.  History reviewed. No pertinent surgical history.   General Information       Row Name 24 1344          Physical Therapy Time and Intention    Document Type evaluation  -CL     Mode of Treatment individual therapy;physical therapy  -CL       Row Name 24 1344          General Information    Patient Profile Reviewed yes  -CL     Prior Level of Function independent:;driving;ADL's;work;community mobility  -CL     Existing Precautions/Restrictions other (see comments);fall  Suicide precautions  -CL       Row Name 24 1344          Living Environment    People in Home alone  -CL       Row Name 24 1344          Home Main Entrance    Number of Stairs, Main Entrance none  -CL       Row Name 24 1344          Stairs Within Home, Primary    Number of Stairs, Within Home, Primary none  -CL       Row Name 24 1344          Cognition    Orientation Status (Cognition) oriented x 3  -CL       Row Name 24 1344          Safety Issues, Functional Mobility    Impairments Affecting Function (Mobility) balance;endurance/activity tolerance;coordination  -CL               User Key  (r) = Recorded By, (t) = Taken By, (c) = Cosigned By      Initials Name Provider Type    CL Yojana Dash PT Physical Therapist                   Mobility       Row Name 24 1345          Bed Mobility    Bed Mobility  bed mobility (all) activities  -CL     All Activities, [place] (Bed Mobility) independent  -CL       Row Name 04/19/24 1345          Sit-Stand Transfer    Sit-Stand [place] (Transfers) supervision  -CL       Row Name 04/19/24 1345          Gait/Stairs (Locomotion)    [place] Level (Gait) contact guard  -CL     Patient was able to Ambulate yes  -CL     Distance in Feet (Gait) 50  -CL     Deviations/Abnormal Patterns (Gait) gait speed decreased;jeannine decreased  -CL     Bilateral Gait Deviations decreased arm swing  -CL               User Key  (r) = Recorded By, (t) = Taken By, (c) = Cosigned By      Initials Name Provider Type    Yojana Anderson, PT Physical Therapist                   Obj/Interventions       Row Name 04/19/24 1346          Range of Motion Comprehensive    General Range of Motion no range of motion deficits identified  -CL       Row Name 04/19/24 1346          Strength Comprehensive (MMT)    Comment, General Manual Muscle Testing (MMT) Assessment has difficulty following MMT; demonstrates sufficient strength to hold UEs and LEs against gravity.  -CL       Row Name 04/19/24 1346          Motor Skills    Motor Skills coordination  -CL     Coordination bilateral;minimal impairment;finger to nose;heel to shin  -CL       Row Name 04/19/24 1346          Balance    Balance Assessment sitting static balance;sitting dynamic balance;standing static balance;standing dynamic balance  -CL     Static Sitting Balance independent  -CL     Dynamic Sitting Balance supervision  -CL     Position, Sitting Balance sitting edge of bed  -CL     Static Standing Balance supervision  -CL     Dynamic Standing Balance contact guard  -CL       Row Name 04/19/24 1346          Sensory Assessment (Somatosensory)    Sensory Assessment (Somatosensory) sensation intact  -CL               User Key  (r) = Recorded By, (t) = Taken By, (c) = Cosigned By      Initials Name Provider Type    Yojana Anderson, PT  Physical Therapist                   Goals/Plan       Row Name 04/19/24 1352          Transfer Goal 1 (PT)    Activity/Assistive Device (Transfer Goal 1, PT) transfers, all  -CL     [place] Level/Cues Needed (Transfer Goal 1, PT) independent  -CL     Time Frame (Transfer Goal 1, PT) long term goal (LTG);2 weeks  -CL       Row Name 04/19/24 1352          Gait Training Goal 1 (PT)    Activity/Assistive Device (Gait Training Goal 1, PT) gait (walking locomotion);diminish gait deviation;improve balance and speed;increase endurance/gait distance  -CL     [place] Level (Gait Training Goal 1, PT) independent  -CL     Distance (Gait Training Goal 1, PT) 400'  -CL     Time Frame (Gait Training Goal 1, PT) long term goal (LTG);2 weeks  -CL       Row Name 04/19/24 1352          Stairs Goal 1 (PT)    Activity/Assistive Device (Stairs Goal 1, PT) ascending stairs;descending stairs  -CL     [place] Level/Cues Needed (Stairs Goal 1, PT) modified independence  -CL     Number of Stairs (Stairs Goal 1, PT) 12  -CL     Time Frame (Stairs Goal 1, PT) long term goal (LTG);2 weeks  -CL       Row Name 04/19/24 1352          Therapy Assessment/Plan (PT)    Planned Therapy Interventions (PT) balance training;gait training;neuromuscular re-education;patient/family education;postural re-education;stair training;strengthening;transfer training  -CL               User Key  (r) = Recorded By, (t) = Taken By, (c) = Cosigned By      Initials Name Provider Type    CL Yojana Dash, PT Physical Therapist                   Clinical Impression       Row Name 04/19/24 0514          Pain    Pretreatment Pain Rating 0/10 - no pain  -CL     Posttreatment Pain Rating 0/10 - no pain  -CL     Pre/Posttreatment Pain Comment Denies physical pain; reports emotional distress  -CL     Pain Intervention(s) Emotional support  -CL       Row Name 04/19/24 6717          Plan of Care Review    Plan of Care Reviewed With patient  -CL     Outcome  Evaluation Keagan Valenzuela is a 38 y.o. male who was found unconscious due to drug overdose. Pt with Rhabdo, delirium and hematemesis. At baseline, pt lives alone in an apt and reports no steps to enter.  He is typically independent with all mobility and gait without AD.  He repots no hx of falls in past 1 year.  Pt works full-time as a  and is an active .  At time of PT evaluation, he is A&O x 3 (states year as his year of birth).  He denies phyiscal pain but indicates he is in emotional pain/distress.  Pt demonstrates independent bed mobility.  He exhibits good ROM throughout with mild shakiness/tremoring throughout and decreased heel to shin and finger to nose accuracy.  He ambulates 50' with CGA but without AD with mildly flexed trunk, wide GLENN and unsteady gait with uneven jeannine and decreased speed. He is tachycardic at start of and throughout session.  He would benefit from continued PT while here but should be able to discharge without continued PT.  -CL       Row Name 04/19/24 7717          Therapy Assessment/Plan (PT)    Rehab Potential (PT) good, to achieve stated therapy goals  -CL     Criteria for Skilled Interventions Met (PT) yes;skilled treatment is necessary  -CL     Therapy Frequency (PT) 5 times/wk  -CL     Predicted Duration of Therapy Intervention (PT) Until discharge  -CL       Row Name 04/19/24 1347          Vital Signs    Pre Systolic BP Rehab 134  -CL     Pre Treatment Diastolic BP 93  -CL     Pretreatment Heart Rate (beats/min) 118  -CL     Pretreatment Resp Rate (breaths/min) 19  -CL       Row Name 04/19/24 1347          Positioning and Restraints    Pre-Treatment Position in bed  -CL     Post Treatment Position bed  -CL     In Bed supine;call light within reach;with family/caregiver;with other staff  -CL               User Key  (r) = Recorded By, (t) = Taken By, (c) = Cosigned By      Initials Name Provider Type    Yojana Anderson, PT Physical  Therapist                   Outcome Measures       [facility] Name 04/19/24 1353 04/19/24 1048       How much help from another person do you currently need...    Turning from your back to your side while in flat bed without using bedrails? 4  -CL 4  -MH    Moving from lying on back to sitting on the side of a flat bed without bedrails? 4  -CL 3  -MH    Moving to and from a bed to a chair (including a wheelchair)? 3  -CL 2  -MH    Standing up from a chair using your arms (e.g., wheelchair, bedside chair)? 3  -CL 2  -MH    Climbing 3-5 steps with a railing? 3  -CL 2  -MH    To walk in hospital room? 3  -CL 2  -MH    AM-PAC 6 Clicks Score (PT) 20  -CL 15  -MH    Highest Level of Mobility Goal 6 --> Walk 10 steps or more  -CL 4 --> Transfer to chair/commode  -MH              User Key  (r) = Recorded By, (t) = Taken By, (c) = Cosigned By      Initials Name Provider Type     Jesus Morgan, RN Registered Nurse     Yojana Dash, PT Physical Therapist                                 Physical Therapy Education       Title: PT OT SLP Therapies (Done)       Topic: Physical Therapy (Done)       Point: Mobility training (Done)       Learning Progress Summary             Patient Acceptance, E,TB, VU by  at 4/19/2024 1353                                         User Key       Initials Effective Dates Name Provider Type Discipline     03/02/22 -  Yojana Dash PT Physical Therapist PT                  PT Recommendation and Plan  Planned Therapy Interventions (PT): balance training, gait training, neuromuscular re-education, patient/family education, postural re-education, stair training, strengthening, transfer training  Plan of Care Reviewed With: patient  Outcome Evaluation: Keagan Valenzuela is a 38 y.o. male who was found unconscious due to drug overdose. Pt with Rhabdo, delirium and hematemesis. At baseline, pt lives alone in an apt and reports no steps to enter.  He is typically independent with all  mobility and gait without AD.  He repots no hx of falls in past 1 year.  Pt works full-time as a  and is an active .  At time of PT evaluation, he is A&O x 3 (states year as his year of birth).  He denies phyiscal pain but indicates he is in emotional pain/distress.  Pt demonstrates independent bed mobility.  He exhibits good ROM throughout with mild shakiness/tremoring throughout and decreased heel to shin and finger to nose accuracy.  He ambulates 50' with CGA but without AD with mildly flexed trunk, wide GLENN and unsteady gait with uneven jeannine and decreased speed. He is tachycardic at start of and throughout session.  He would benefit from continued PT while here but should be able to discharge without continued PT.     Time Calculation:   PT Evaluation Complexity  History, PT Evaluation Complexity: 1-2 personal factors and/or comorbidities  Examination of Body Systems (PT Eval Complexity): total of 3 or more elements  Clinical Presentation (PT Evaluation Complexity): evolving  Clinical Decision Making (PT Evaluation Complexity): low complexity  Overall Complexity (PT Evaluation Complexity): low complexity     PT Charges       Row Name 04/19/24 1354             Time Calculation    Start Time 1319  -CL      Stop Time 1335  -CL      Time Calculation (min) 16 min  -CL      PT Received On 04/19/24  -CL      PT - Next Appointment 04/21/24  -CL      PT Goal Re-Cert Due Date 05/03/24  -CL         Time Calculation- PT    Total Timed Code Minutes- PT 16 minute(s)  -CL                User Key  (r) = Recorded By, (t) = Taken By, (c) = Cosigned By      Initials Name Provider Type    CL Yojana Dash, PT Physical Therapist                  Therapy Charges for Today       Code Description Service Date Service Provider Modifiers Qty    [id number]  PT EVAL LOW COMPLEXITY 3 4/19/2024 Yojana Dash, PT GP 1            PT G-Codes  AM-PAC 6 Clicks Score (PT): 20  PT Discharge  Summary  Anticipated Discharge Disposition (PT): home    Yojana Dash, PT  4/19/2024

## 2024-04-19 NOTE — CASE MANAGEMENT/SOCIAL WORK
Social Work Assessment   Sami     Patient Name: Keaagn Valenzuela  MRN: [id number]  Today's Date: 4/19/2024    Admit Date: 4/17/2024     Discharge Plan       Row Name 04/19/24 0835       Plan    Plan Comments Sw reveiwed chart. Pt not A&O to participate in assessment. Sw following for recommendations.             ADEBAYO West, MSW    Phone: [phone]  Fax: [phone]  Email: [email or web address]

## 2024-04-19 NOTE — PLAN OF CARE
Goal Outcome Evaluation:              Outcome Evaluation: Pt. has been resting in bed without complaints. Pt. was A&Ox4. Pt. has been sleeping in between care, easy to arouse. Pt. is still drowsy. Pt. remains on IV fluids. Pt. remains in SI precautions with sitter at bedside. FC is being DC'd.

## 2024-04-19 NOTE — PROGRESS NOTES
" LOS: 2 days   Patient Care Team:  Keri Shrestha APRN as PCP - General (Nurse Practitioner)  Maikol Mclean MD as Consulting Physician (Nephrology)      Subjective \" I am okay\"    Interval History:     Subjective: Nausea or vomiting.  Tolerating diet.  No abdominal pain.  No melena or rectal bleeding.      ROS:   No chest pain, shortness of breath, or cough.        Medication Review:     Current Facility-Administered Medications:     sennosides-docusate (PERICOLACE) 8.6-50 MG per tablet 2 tablet, 2 tablet, Oral, BID PRN **AND** polyethylene glycol (MIRALAX) packet 17 g, 17 g, Oral, Daily PRN **AND** bisacodyl (DULCOLAX) EC tablet 5 mg, 5 mg, Oral, Daily PRN **AND** bisacodyl (DULCOLAX) suppository 10 mg, 10 mg, Rectal, Daily PRN, Anitra Martino MD    cefTRIAXone (ROCEPHIN) 1,000 mg in sodium chloride 0.9 % 100 mL MBP, 1,000 mg, Intravenous, Q24H, Anitra Martino MD, Last Rate: 200 mL/hr at 04/19/24 1708, 1,000 mg at 04/19/24 1708    dextrose (D50W) (25 g/50 mL) IV injection 25 g, 25 g, Intravenous, Q15 Min PRN, Miguel Angel Eugene MD, 25 g at 04/18/24 0801    dextrose (GLUTOSE) oral gel 15 g, 15 g, Oral, Q15 Min PRN, Miguel Angel Eugene MD    dextrose 5 % and sodium chloride 0.9 % infusion, 150 mL/hr, Intravenous, Continuous, Miguel Angel Eugene MD, Last Rate: 150 mL/hr at 04/19/24 1430, 150 mL/hr at 04/19/24 1430    glucagon (GLUCAGEN) injection 1 mg, 1 mg, Subcutaneous, Q15 Min PRN, Miguel Angel Eugene MD    LORazepam (ATIVAN) injection 1 mg, 1 mg, Intravenous, Q6H PRN, Anitra Martino MD, 1 mg at 04/17/24 1844    LORazepam (ATIVAN) injection 2 mg, 2 mg, Intravenous, Q15 Min PRN, Consuelo Chandler, APRN, 2 mg at 04/18/24 1116    LORazepam (ATIVAN) injection 4 mg, 4 mg, Intravenous, Q30 Min PRN, Consuelo Chandler, APRN, 4 mg at 04/17/24 2123    ondansetron ODT (ZOFRAN-ODT) disintegrating tablet 4 mg, 4 mg, Oral, Q6H PRN **OR** ondansetron (ZOFRAN) injection 4 mg, 4 mg, Intravenous, Q6H PRN, Anitra Martino MD    " pantoprazole (PROTONIX) injection 40 mg, 40 mg, Intravenous, Q12H, Viral Vega MD, 40 mg at 04/19/24 1708    sertraline (ZOLOFT) tablet 25 mg, 25 mg, Oral, Daily, Umm Astorga, ABAD, APRN, 25 mg at 04/19/24 1436    sodium chloride 0.9 % flush 10 mL, 10 mL, Intravenous, PRN, Aretha Up PA-C    sodium chloride 0.9 % flush 10 mL, 10 mL, Intravenous, Q12H, Anitra Martino MD, 10 mL at 04/19/24 1046    sodium chloride 0.9 % flush 10 mL, 10 mL, Intravenous, PRN, Anitra Martino MD    sodium chloride 0.9 % infusion 40 mL, 40 mL, Intravenous, PRN, Anitra Martino MD      Objective resting in bed, no acute distress, family and sitter at bedside    Vital Signs  Vitals:    04/19/24 0700 04/19/24 1048 04/19/24 1100 04/19/24 1600   BP: 114/68  128/86 126/66   BP Location: Right arm  Right arm Left arm   Patient Position: Lying  Lying Lying   Pulse: 89  115    Resp: 18  16 16   Temp: 98.8 °F (37.1 °C)  98 °F (36.7 °C) 98.4 °F (36.9 °C)   TempSrc: Oral  Oral Oral   SpO2: (!) 85% 96% 94%    Weight:       Height:           Physical Exam:     General Appearance:    Awake and alert, in no acute distress, flat affect   Head:    Normocephalic, without obvious abnormality   Eyes:          Conjunctivae normal, anicteric sclera   Throat:   No oral lesions, no thrush, oral mucosa moist   Neck:   supple, no JVD   Lungs:     respirations regular, even and unlabored   Abdomen:   Nondistended   Rectal:     Deferred   Extremities:   No edema, no cyanosis   Skin:   No bruising or rash, no jaundice        Results Review:    CBC    Results from last 7 days   Lab Units 04/19/24  1233 04/18/24  0922 04/17/24  2318 04/17/24  2120 04/17/24  1405 04/15/24  0831   WBC 10*3/mm3 8.77 7.73 9.60  --  13.31* 7.47   HEMOGLOBIN g/dL 14.0 12.8* 12.8*  --  14.7 13.8   HEMOGLOBIN, POC g/dL  --   --   --  14.0  --   --    PLATELETS 10*3/mm3 188 145 164  --  213 176     CMP   Results from last 7 days   Lab Units 04/19/24  1233 04/18/24  0922  "04/17/24  2318 04/17/24  2120 04/17/24  1405 04/15/24  0831   SODIUM mmol/L 141 140 140  --  140 135*   POTASSIUM mmol/L 3.8 3.8 4.3  --  3.4* 3.3*   CHLORIDE mmol/L 103 108* 106  --  99 96*   CO2 mmol/L 29.0 26.0 23.0  --  25.0 28.0   BUN mg/dL 3* 9 8  --  10 9   CREATININE mg/dL 0.90 1.14 1.23 1.16 1.20 1.27   GLUCOSE mg/dL 103* 88 85  --  92 115*   ALBUMIN g/dL 3.6 3.5 3.7  --  4.7 4.2   BILIRUBIN mg/dL 0.3 0.6 0.6  --  0.6 0.6   ALK PHOS U/L 65 61 63  --  79 63   AST (SGOT) U/L 272* 325* 273*  --  122* 20   ALT (SGPT) U/L 76* 61* 51*  --  31 11   PHOSPHORUS mg/dL 2.3*  --   --   --   --   --      Cr Clearance Estimated Creatinine Clearance: 90.4 mL/min (by C-G formula based on SCr of 0.9 mg/dL).  Coag     HbA1C No results found for: \"HGBA1C\"  Results from last 7 days   Lab Units 04/19/24  1233 04/18/24  0922 04/17/24  1405   CK TOTAL U/L >20,000* >20,000* 13,383*     Infection   Results from last 7 days   Lab Units 04/17/24  1809   BLOODCX  No growth at 24 hours  No growth at 24 hours     UA    Results from last 7 days   Lab Units 04/17/24  1416   NITRITE UA  Negative   WBC UA /HPF 3-5*   BACTERIA UA /HPF None Seen   SQUAM EPITHEL UA /HPF 0-2     Microbiology Results (last 10 days)       Procedure Component Value - Date/Time    Blood Culture - Blood, Arm, Right [[id number]]  (Normal) Collected: 04/17/24 1809    Lab Status: Preliminary result Specimen: Blood from Arm, Right Updated: 04/18/24 1831     Blood Culture No growth at 24 hours    Blood Culture - Blood, Hand, Right [[id number]]  (Normal) Collected: 04/17/24 1809    Lab Status: Preliminary result Specimen: Blood from Hand, Right Updated: 04/18/24 1831     Blood Culture No growth at 24 hours    Chlamydia trachomatis, Neisseria gonorrhoeae, PCR - Urine, Urine, Clean Catch [[id number]]  (Normal) Collected: 04/15/24 0854    Lab Status: Final result Specimen: Urine, Clean Catch Updated: 04/15/24 1030     Chlamydia DNA by PCR Not Detected     Neisseria gonorrhoeae " by PCR Not Detected          Imaging Results (Last 72 Hours)       Procedure Component Value Units Date/Time    CT Abdomen Pelvis With Contrast [[id number]] Collected: 04/17/24 1509     Updated: 04/17/24 1520    Narrative:      CT ABDOMEN PELVIS W CONTRAST    Date of Exam: 4/17/2024 3:02 PM EDT    Indication: vomiting blood. AMS.    Comparison: None available.    Technique: Axial CT images were obtained of the abdomen and pelvis following the uneventful intravenous administration of iodinated contrast. Sagittal and coronal reconstructions were performed.  Automated exposure control and iterative reconstruction   methods were used.        Findings:  Layering high density material is seen within the stomach, which could represent blood products, given the history of hematemesis, or could represent ingested high density material within the gastric lumen. No gastric mass or focal gastric inflammatory   change is appreciated.    There is moderate air-fluid distention of the ascending colon and transverse colon and splenic flexure of the colon. More mild air-fluid distention is seen within the large and small bowel loops. The overall pattern is favored to represent generalized   ileus.    The appendix is filled with air and appears normal (series 4 image 87).    Small dots of air are seen along the walls of the ascending colon, thought to be within the lumen of the bowel rather than representing bowel wall pneumatosis.    No free air or free fluid or adenopathy is seen within the abdomen/pelvis.    Urinary bladder, prostate and rectum are normal.    Liver, gallbladder, spleen, pancreas, adrenals, and kidneys are within normal limits.    The lung bases are clear. The heart size is within normal limits.    No acute or suspicious osseous abnormalities are identified.      Impression:      Impression:    1. Generalized air of fluid distention of the bowel, greatest in the ascending colon and transverse colon. Findings favor  the presence of diffuse ileus.  2. There is some layering high density material within the lumen of the stomach, which could represent ingested material or perhaps could represent a small amount of blood products, given the provided history of hematemesis. No focal gastric inflammatory   change or mass lesion is identified.  3. Tiny dots of air are seen along the periphery of the ascending colon, favored represent air within the lumen of the bowel rather than that of pneumatosis. However, to be on the safe side, I would recommend short-term abdominal x-ray/KUB follow-up to   ensure stability, improvement or resolution.          Electronically Signed: Vane Silva MD    4/17/2024 3:18 PM EDT    Workstation ID: [id number]    CT Head Without Contrast [[id number]] Collected: 04/17/24 1510     Updated: 04/17/24 1514    Narrative:      CT HEAD WO CONTRAST    Date of Exam: 4/17/2024 3:02 PM EDT    Indication: AMS.    Comparison: None available.    Technique: Axial CT images were obtained of the head without contrast administration.  Coronal reconstructions were performed.  Automated exposure control and iterative reconstruction methods were used.    Findings: Exam limited by patient motion throughout the study. Gray-white matter differentiation is maintained without evidence of an acute infarction. No intracranial mass or mass effect. No extra-axial mass or collection. The ventricles and sulci are   normal in size and configuration. The posterior fossa appears normal. Sellar and suprasellar structures are normal.    Orbital and paranasal soft tissues are normal. The paranasal sinuses, ethmoid air cells, and mastoid air cells are aerated. The bony calvarium appears intact. No acute fractures. No lytic or blastic bony diseases. Cerumen within the external acoustic   canals.      Impression:      Impression: No acute intracranial pathology. Examination limited due to patient motion.          Electronically Signed: Davis Hassan  MD    4/17/2024 3:12 PM EDT    Workstation ID: [id number]    XR Chest 1 View [[id number]] Collected: 04/17/24 1407     Updated: 04/17/24 1410    Narrative:      XR CHEST 1 VW    Date of Exam: 4/17/2024 2:04 PM EDT    Indication: AMS Protocol  AMS Protocol    Comparison: None available.    Findings:  No acute airspace disease. Heart size is within normal limits. No pleural effusion or pneumothorax or acute osseous abnormality      Impression:      Impression:  No acute chest finding.      Electronically Signed: Vane Silva MD    4/17/2024 2:07 PM EDT    Workstation ID: [id number]            Assessment & Plan   Nausea and vomiting with reported hematemesis -resolved  Elevated LFTs -likely multifactorial  Rhabdomyolysis  Suicide attempt with overdose  Delirium  Hypokalemia     PLAN:  Patient is a 38-year-old male who presents after being found unresponsive at home after a suicide attempt with overdose of Benadryl, NyQuil and DayQuil.  GI asked consult for nausea and vomiting with a hematemesis episode apparently witnessed in the ER.  Nurse reports no bowel movement since admission and no further vomiting overnight.     No nausea or vomiting and tolerating diet.  Hemoglobin normal without evidence of further GI bleeding.  Okay to continue PPI.  No plan for endoscopic evaluation.  Elevated LFTs with rhabdo and overdose.  Finish Acetadote per protocol.  No plan for further workup.  Continue supportive care    Electronically signed by DHARMESH Braun, 04/19/24, 5:33 PM EDT.

## 2024-04-19 NOTE — CONSULTS
"  Referring Provider: Consuelo Chandler APRN   Reason for Consultation: Benadryl overdose    Chief complaint : Depression , SI     Subjective .     History of present illness:  The patient is a 38 y.o. male who was admitted secondary to overdose.  Patient was found on the floor unconscious.  Past medical history includes depression.  Patient reports he intentionally overdosed on Benadryl and NyQuil and rubbing alcohol with the intent to harm himself and die.  No other previous attempts   Denies specific plan     Patient has a long history of depression hospitalized in 2018 and seen by [facility].  Patient reports that he started an antidepressant at that time cannot recall which one.  Not currently taking any medications at this time.    Patient reports misusing cold and cough medicine to get high.  Evasive with details or length of time.  History of hallucinations related to cough and cold medicine misuse.  Denies other substance use including alcohol    Family at bedside report patient lives alone.  Irritable.  Limited support  Does not work, mostly isolated.  Has been struggling with depression for some time.    Patient endorses SI, denies plan  Denies anxiety  Depression associated with low mood, low energy, feels helpless helpless.  Denies AVH/HI  Not aggressive or acutely agitated at this time      The following portions of the patient's history were reviewed and updated as appropriate: allergies, current medications, past family history, past medical history, past social history, past surgical history and problem list.    History    Objective     Vital Signs   /86 (BP Location: Right arm, Patient Position: Lying)   Pulse 115   Temp 98 °F (36.7 °C) (Oral)   Resp 16   Ht 177.8 cm (70\")   Wt 58 kg (127 lb 13.9 oz)   SpO2 94%   BMI 18.35 kg/m²     MENTAL STATUS EXAM   General Appearance:  Cleanly groomed and dressed  Eye Contact:  Poor eye contact  Attitude:  Guarded  Motor " Activity:  Slow  Speech:  Minimal spontaneity  Mood and affect:  Depressed and irritable  Thought Process:  Goal-directed and linear  Associations/ Thought Content:  No delusions  Hallucinations:  None  Suicidal Ideations:  Other  Other Comment:  Endorses SI but not specific plan  Homicidal Ideation:  Not present  Sensorium:  Alert  Orientation:  Person, place, time and situation  Intellectual Functioning:  Average range  Insight:  Limited  Judgement:  Limited  Reliability:  Poor  Impulse Control:  Fair         Assessment & Plan       Overdose       Assessment: Depression with SI, intentional overdose     1. Altered mental status, unspecified altered mental status type    2. Non-traumatic rhabdomyolysis    3. Hematemesis, unspecified whether nausea present    4. Anticholinergic drug overdose, undetermined intent, initial encounter      Treatment Plan: Pt presents after intentional OD. Pt suffers from severe depression. Not currently on medications.  Patient would likely benefit from SSRI for depression and appropriate at this time for inpatient psychiatric treatment.  Agreeable to inpatient psychiatric treatment  Agreeable to medication management  Endorsing SI, without specific plan  Family has safety concerns if patient were to leave hospital, agreeable to be here at this time, can initiate 24-hour to 48-hour medical hold if patient tries to leave before medically clear.  Continue SI precautions and sitter  Start sertraline 25 mg daily for depression  Will follow  Treatment Plan discussed with: Patient and Family    I discussed the patients findings and my recommendations with patient, family, nursing staff, and consulting provider    I have reviewed and approved the behavioral health treatment plans and problem list. Yes  Thank you for the consult   Referring MD has access to consult report and progress notes in EMR     Umm Astorga DNP, APRN  04/19/24  12:46 EDT

## 2024-04-20 LAB
ALBUMIN SERPL-MCNC: 3.6 G/DL (ref 3.5–5.2)
ANION GAP SERPL CALCULATED.3IONS-SCNC: 10 MMOL/L (ref 5–15)
BUN SERPL-MCNC: 4 MG/DL (ref 6–20)
BUN/CREAT SERPL: 4.6 (ref 7–25)
CALCIUM SPEC-SCNC: 8.9 MG/DL (ref 8.6–10.5)
CHLORIDE SERPL-SCNC: 105 MMOL/L (ref 98–107)
CK SERPL-CCNC: ABNORMAL U/L (ref 20–200)
CO2 SERPL-SCNC: 29 MMOL/L (ref 22–29)
CREAT SERPL-MCNC: 0.87 MG/DL (ref 0.76–1.27)
EGFRCR SERPLBLD CKD-EPI 2021: 112.6 ML/MIN/1.73
GLUCOSE SERPL-MCNC: 99 MG/DL (ref 65–99)
PHOSPHATE SERPL-MCNC: 3 MG/DL (ref 2.5–4.5)
POTASSIUM SERPL-SCNC: 3.7 MMOL/L (ref 3.5–5.2)
SODIUM SERPL-SCNC: 144 MMOL/L (ref 136–145)

## 2024-04-20 PROCEDURE — 25010000002 CEFTRIAXONE PER 250 MG: Performed by: INTERNAL MEDICINE

## 2024-04-20 PROCEDURE — 25010000002 LORAZEPAM PER 2 MG: Performed by: NURSE PRACTITIONER

## 2024-04-20 PROCEDURE — 82550 ASSAY OF CK (CPK): CPT | Performed by: INTERNAL MEDICINE

## 2024-04-20 PROCEDURE — 25810000003 SODIUM CHLORIDE 0.9 % SOLUTION: Performed by: INTERNAL MEDICINE

## 2024-04-20 PROCEDURE — 80069 RENAL FUNCTION PANEL: CPT | Performed by: INTERNAL MEDICINE

## 2024-04-20 PROCEDURE — 25010000002 ONDANSETRON PER 1 MG: Performed by: INTERNAL MEDICINE

## 2024-04-20 PROCEDURE — 97116 GAIT TRAINING THERAPY: CPT

## 2024-04-20 RX ORDER — SODIUM CHLORIDE 9 MG/ML
125 INJECTION, SOLUTION INTRAVENOUS CONTINUOUS
Status: DISCONTINUED | OUTPATIENT
Start: 2024-04-20 | End: 2024-04-21

## 2024-04-20 RX ADMIN — Medication 10 ML: at 09:18

## 2024-04-20 RX ADMIN — CEFTRIAXONE 1000 MG: 1 INJECTION, POWDER, FOR SOLUTION INTRAMUSCULAR; INTRAVENOUS at 16:57

## 2024-04-20 RX ADMIN — PANTOPRAZOLE SODIUM 40 MG: 40 INJECTION, POWDER, FOR SOLUTION INTRAVENOUS at 16:48

## 2024-04-20 RX ADMIN — SODIUM CHLORIDE 125 ML/HR: 9 INJECTION, SOLUTION INTRAVENOUS at 15:10

## 2024-04-20 RX ADMIN — PANTOPRAZOLE SODIUM 40 MG: 40 INJECTION, POWDER, FOR SOLUTION INTRAVENOUS at 05:47

## 2024-04-20 RX ADMIN — Medication 10 ML: at 20:55

## 2024-04-20 RX ADMIN — LORAZEPAM 2 MG: 2 INJECTION INTRAMUSCULAR; INTRAVENOUS at 23:54

## 2024-04-20 RX ADMIN — ONDANSETRON 4 MG: 2 INJECTION INTRAMUSCULAR; INTRAVENOUS at 20:54

## 2024-04-20 RX ADMIN — SERTRALINE HYDROCHLORIDE 25 MG: 25 TABLET ORAL at 09:18

## 2024-04-20 NOTE — THERAPY TREATMENT NOTE
Subjective: Pt agreeable to therapeutic plan of care. Pt very flat affect and not talkative. Sl impulsive and would just get up to walk without warning.    Objective:     Bed mobility - Independent  Transfers - Supervision  Ambulation - 200 feet CGA    Vitals: WNL    Pain: 0 VAS       Education: Provided education on the importance of mobility in the acute care setting, Verbal/Tactile Cues, Transfer Training, and Gait Training    Assessment: Keagan Valenzuela presents with functional mobility impairments which indicate the need for skilled intervention. Tolerating session today without incident. Patient very difficult to read due to flat affect and not talking. Presents with lat sway and not able to take on challenges. Plans are for psych inpt at dc. Will continue to follow and progress as tolerated.     Plan/Recommendations:   If medically appropriate, Plans on inpt psych Pt requires no DME at discharge.     Pt desires inpatient psych at discharge. Pt cooperative; agreeable to therapeutic recommendations and plan of care.         Basic Mobility 6-click:  Rollin = Total, A lot = 2, A little = 3; 4 = None  Supine>Sit:   1 = Total, A lot = 2, A little = 3; 4 = None   Sit>Stand with arms:  1 = Total, A lot = 2, A little = 3; 4 = None  Bed>Chair:   1 = Total, A lot = 2, A little = 3; 4 = None  Ambulate in room:  1 = Total, A lot = 2, A little = 3; 4 = None  3-5 Steps with railin = Total, A lot = 2, A little = 3; 4 = None  Score: 22    Post-Tx Position: Supine with HOB Elevated, Staff Present, and Call light and personal items within reach  PPE: gloves

## 2024-04-20 NOTE — CASE MANAGEMENT/SOCIAL WORK
Continued Stay Note  [facility]     Patient Name: Keagan Valenzuela  MRN: [id number]  Today's Date: 4/20/2024    Admit Date: 4/17/2024    Plan: Inpatient psych pending availability.   Discharge Plan       Row Name 04/20/24 1812       Plan    Plan Comments  will send inpatient psych referrals when IV antibiotics are discontinued. MD stated IV antibiotics needs will be re-evaluated on Sunday.             Kiesha Pendleton LCSW, APHSW-C  Medical Social Worker  [facility]  [address] [place] [address]  [facility] [place] Office:[phone]

## 2024-04-20 NOTE — PLAN OF CARE
Assessment: Keagan Valenzuela presents with functional mobility impairments which indicate the need for skilled intervention. Tolerating session today without incident. Patient very difficult to read due to flat affect and not talking. Presents with lat sway and not able to take on challenges. Plans are for psych inpt at [place]. Will continue to follow and progress as tolerated.

## 2024-04-20 NOTE — CONSULTS
"Nutrition Services  Patient Name: Keagan Valenzuela  YOB: 1985  MRN: [id number]  Admission date: 4/17/2024    NUTRITION SCREENING      Trending Narrative: 4/19: Pt evaluated due to low weight. Pt with BMI less than 19 kg/m^2, with documented downtrend in weight. Pt working with another provider x 2 attempted visits today. Pt is visibly thin - suspect presence of malnutrition, but unable to ascertain chronicity versus acuity based on visual exam only. Pt does have Hx depression, which may impact his intake long-term. Will continue to monitor and will follow up for NFPE when pt able to participate. Could benefit from ONS in addition to liberalized diet.        PO Diet: Diet: Regular/House; Safe Tray; Fluid Consistency: Thin (IDDSI 0)   PO Supplements: None ordered    Trending PO Intake:  Averaging 75% since diet resumed        Nutritionally-Pertinent Medications RDN Reviewed, C/W clinical course         Labs (reviewed below):      Results from last 7 days   Lab Units 04/19/24  1233 04/18/24  0922 04/17/24  2318   SODIUM mmol/L 141 140 140   POTASSIUM mmol/L 3.8 3.8 4.3   CHLORIDE mmol/L 103 108* 106   CO2 mmol/L 29.0 26.0 23.0   BUN mg/dL 3* 9 8   CREATININE mg/dL 0.90 1.14 1.23   CALCIUM mg/dL 8.8 8.5* 8.7   BILIRUBIN mg/dL 0.3 0.6 0.6   ALK PHOS U/L 65 61 63   ALT (SGPT) U/L 76* 61* 51*   AST (SGOT) U/L 272* 325* 273*   GLUCOSE mg/dL 103* 88 85     Results from last 7 days   Lab Units 04/19/24  1233   PHOSPHORUS mg/dL 2.3*   HEMOGLOBIN g/dL 14.0   HEMATOCRIT % 41.4     No results found for: \"HGBA1C\"       GI Function:  BM 4/19 per pt report        Skin: No breakdown documented        Weight Review: Estimated body mass index is 18.35 kg/m² as calculated from the following:    Height as of this encounter: 177.8 cm (70\").    Weight as of this encounter: 58 kg (127 lb 13.9 oz).    Comment:   4/19: Scale weight 58 kg this admission   This represents 13% decrease from historic wt of 66.7 kg in " 11/2020, but as this was several years ago, uncertain how more recent weights may trend -- will follow up    Wt Readings from Last 30 Encounters:   04/17/24 1340 58 kg (127 lb 13.9 oz)   04/15/24 0733 58 kg (127 lb 13.9 oz)   01/23/24 1340 59 kg (130 lb)   08/24/23 1601 59 kg (130 lb)   11/24/20 0802 66.7 kg (147 lb)   11/22/20 1822 64.1 kg (141 lb 5 oz)      --       Nutrition Problem Statement: Underweight R/t suspected chronic insufficient intake; as evidenced by pt with BMI < 19kg/m^2.         Nutrition Intervention: Continue liberalized, Regular diet     Add Boost Plus BID (Provides 720 kcals, 28 g protein if consumed)   Boost Glucose Control may be substituted for Boost Plus at this time, due to national shortage of many ONS products. If substituted, each Boost Glucose Control will provide 190 kcal and 16g PRO.            Monitoring/Evaluation PO intake, Pertinent labs, Weight, Skin status, GI status, POC/GOC        RD to follow up per protocol.    Electronically signed by:  Rochelle Josue RD  04/19/24

## 2024-04-20 NOTE — NURSING NOTE
Cordless call light received from maintenance. Cords removed from monitor not in use. Gait belt and tubing placed outside room for safety. Sitter at bedside to monitor IVF tubing for safety. Pt is awake and alert. No c/o pain. Flat affect noted.

## 2024-04-20 NOTE — PROGRESS NOTES
"NEPHROLOGY PROGRESS NOTE------[facility]    [facility]  [phone]  Maikol Mclean MD      Patient Care Team:  Keri Shrestha APRN as PCP - General (Nurse Practitioner)  Maikol Mclean MD as Consulting Physician (Nephrology)      Provider:  Maikol Mclean MD  Patient Name: Keagan Valenzuela  :  1985    SUBJECTIVE:  Follow-up TYLER/rhabdomyolysis  No chest pain or shortness of breath    Medication:  cefTRIAXone, 1,000 mg, Intravenous, Q24H  pantoprazole, 40 mg, Intravenous, Q12H  sertraline, 25 mg, Oral, Daily  sodium chloride, 10 mL, Intravenous, Q12H      dextrose 5 % and sodium chloride 0.9 %, 150 mL/hr, Last Rate: 150 mL/hr (243)        OBJECTIVE    Vital Sign Min/Max for last 24 hours  Temp  Min: 97.9 °F (36.6 °C)  Max: 98.4 °F (36.9 °C)   BP  Min: 110/72  Max: 130/82   Pulse  Min: 72  Max: 115   Resp  Min: 13  Max: 18   SpO2  Min: 94 %  Max: 99 %   No data recorded   No data recorded     Flowsheet Rows      Flowsheet Row First Filed Value   Admission Height 177.8 cm (70\") Documented at 2024 1340   Admission Weight 58 kg (127 lb 13.9 oz) Documented at 2024 1340            No intake/output data recorded.  I/O last 3 completed shifts:  In: 2845 [P.O.:480; I.V.:2365]  Out: 4150 [Urine:4150]    Physical Exam:  General Appearance: alert, appears stated age and cooperative  Head: normocephalic, without obvious abnormality and atraumatic  Eyes: conjunctivae and sclerae normal and no icterus  Neck: supple and no JVD  Lungs: clear to auscultation and respirations regular  Heart: regular rhythm & normal rate and normal S1, S2  Chest: Wall no abnormalities observed  Abdomen: normal bowel sounds and soft, nontender  Extremities: moves extremities well, no edema, no cyanosis and no redness  Skin: no bleeding, bruising or rash, turgor normal, color normal and no lesions noted  Neurologic: Alert, and oriented. No focal " "deficits    Labs:    WBC WBC   Date Value Ref Range Status   04/19/2024 8.77 3.40 - 10.80 10*3/mm3 Final   04/18/2024 7.73 3.40 - 10.80 10*3/mm3 Final   04/17/2024 9.60 3.40 - 10.80 10*3/mm3 Final   04/17/2024 13.31 (H) 3.40 - 10.80 10*3/mm3 Final      HGB Hemoglobin   Date Value Ref Range Status   04/19/2024 14.0 13.0 - 17.7 g/dL Final   04/18/2024 12.8 (L) 13.0 - 17.7 g/dL Final   04/17/2024 12.8 (L) 13.0 - 17.7 g/dL Final   04/17/2024 14.0 12.0 - 17.0 g/dL Final   04/17/2024 14.7 13.0 - 17.7 g/dL Final      HCT Hematocrit   Date Value Ref Range Status   04/19/2024 41.4 37.5 - 51.0 % Final   04/18/2024 38.6 37.5 - 51.0 % Final   04/17/2024 37.5 37.5 - 51.0 % Final   04/17/2024 41 38 - 51 % Final   04/17/2024 40.9 37.5 - 51.0 % Final      Platelets No results found for: \"LABPLAT\"   MCV MCV   Date Value Ref Range Status   04/19/2024 88.5 79.0 - 97.0 fL Final   04/18/2024 89.8 79.0 - 97.0 fL Final   04/17/2024 87.4 79.0 - 97.0 fL Final   04/17/2024 84.5 79.0 - 97.0 fL Final          Sodium Sodium   Date Value Ref Range Status   04/20/2024 144 136 - 145 mmol/L Final   04/19/2024 141 136 - 145 mmol/L Final   04/18/2024 140 136 - 145 mmol/L Final   04/17/2024 140 136 - 145 mmol/L Final   04/17/2024 140 136 - 145 mmol/L Final      Potassium Potassium   Date Value Ref Range Status   04/20/2024 3.7 3.5 - 5.2 mmol/L Final   04/19/2024 3.8 3.5 - 5.2 mmol/L Final   04/18/2024 3.8 3.5 - 5.2 mmol/L Final   04/17/2024 4.3 3.5 - 5.2 mmol/L Final     Comment:     Slight hemolysis detected by analyzer. Result may be falsely elevated.  Result checked     04/17/2024 3.4 (L) 3.5 - 5.2 mmol/L Final     Comment:     Slight hemolysis detected by analyzer. Result may be falsely elevated.      Chloride Chloride   Date Value Ref Range Status   04/20/2024 105 98 - 107 mmol/L Final   04/19/2024 103 98 - 107 mmol/L Final   04/18/2024 108 (H) 98 - 107 mmol/L Final   04/17/2024 106 98 - 107 mmol/L Final   04/17/2024 99 98 - 107 mmol/L Final    " "  CO2 CO2   Date Value Ref Range Status   04/20/2024 29.0 22.0 - 29.0 mmol/L Final   04/19/2024 29.0 22.0 - 29.0 mmol/L Final   04/18/2024 26.0 22.0 - 29.0 mmol/L Final   04/17/2024 23.0 22.0 - 29.0 mmol/L Final   04/17/2024 25.0 22.0 - 29.0 mmol/L Final      BUN BUN   Date Value Ref Range Status   04/20/2024 4 (L) 6 - 20 mg/dL Final   04/19/2024 3 (L) 6 - 20 mg/dL Final   04/18/2024 9 6 - 20 mg/dL Final   04/17/2024 8 6 - 20 mg/dL Final   04/17/2024 10 6 - 20 mg/dL Final      Creatinine Creatinine   Date Value Ref Range Status   04/20/2024 0.87 0.76 - 1.27 mg/dL Final   04/19/2024 0.90 0.76 - 1.27 mg/dL Final   04/18/2024 1.14 0.76 - 1.27 mg/dL Final   04/17/2024 1.23 0.76 - 1.27 mg/dL Final   04/17/2024 1.16 0.60 - 1.30 mg/dL Final     Comment:     Serial Number: [id number]:  [id number]   04/17/2024 1.20 0.76 - 1.27 mg/dL Final      Calcium Calcium   Date Value Ref Range Status   04/20/2024 8.9 8.6 - 10.5 mg/dL Final   04/19/2024 8.8 8.6 - 10.5 mg/dL Final   04/18/2024 8.5 (L) 8.6 - 10.5 mg/dL Final   04/17/2024 8.7 8.6 - 10.5 mg/dL Final   04/17/2024 10.0 8.6 - 10.5 mg/dL Final      PO4 No components found for: \"PO4\"   Albumin Albumin   Date Value Ref Range Status   04/20/2024 3.6 3.5 - 5.2 g/dL Final   04/19/2024 3.6 3.5 - 5.2 g/dL Final   04/18/2024 3.5 3.5 - 5.2 g/dL Final   04/17/2024 3.7 3.5 - 5.2 g/dL Final   04/17/2024 4.7 3.5 - 5.2 g/dL Final      Magnesium No results found for: \"MG\"   Uric Acid No components found for: \"URIC ACID\"     Imaging Results (Last 72 Hours)       Procedure Component Value Units Date/Time    CT Abdomen Pelvis With Contrast [[id number]] Collected: 04/17/24 1509     Updated: 04/17/24 1520    Narrative:      CT ABDOMEN PELVIS W CONTRAST    Date of Exam: 4/17/2024 3:02 PM EDT    Indication: vomiting blood. AMS.    Comparison: None available.    Technique: Axial CT images were obtained of the abdomen and pelvis following the uneventful intravenous administration of iodinated contrast. " Sagittal and coronal reconstructions were performed.  Automated exposure control and iterative reconstruction   methods were used.        Findings:  Layering high density material is seen within the stomach, which could represent blood products, given the history of hematemesis, or could represent ingested high density material within the gastric lumen. No gastric mass or focal gastric inflammatory   change is appreciated.    There is moderate air-fluid distention of the ascending colon and transverse colon and splenic flexure of the colon. More mild air-fluid distention is seen within the large and small bowel loops. The overall pattern is favored to represent generalized   ileus.    The appendix is filled with air and appears normal (series 4 image 87).    Small dots of air are seen along the walls of the ascending colon, thought to be within the lumen of the bowel rather than representing bowel wall pneumatosis.    No free air or free fluid or adenopathy is seen within the abdomen/pelvis.    Urinary bladder, prostate and rectum are normal.    Liver, gallbladder, spleen, pancreas, adrenals, and kidneys are within normal limits.    The lung bases are clear. The heart size is within normal limits.    No acute or suspicious osseous abnormalities are identified.      Impression:      Impression:    1. Generalized air of fluid distention of the bowel, greatest in the ascending colon and transverse colon. Findings favor the presence of diffuse ileus.  2. There is some layering high density material within the lumen of the stomach, which could represent ingested material or perhaps could represent a small amount of blood products, given the provided history of hematemesis. No focal gastric inflammatory   change or mass lesion is identified.  3. Tiny dots of air are seen along the periphery of the ascending colon, favored represent air within the lumen of the bowel rather than that of pneumatosis. However, to be on the  safe side, I would recommend short-term abdominal x-ray/KUB follow-up to   ensure stability, improvement or resolution.          Electronically Signed: Vane Silva MD    4/17/2024 3:18 PM EDT    Workstation ID: [id number]    CT Head Without Contrast [[id number]] Collected: 04/17/24 1510     Updated: 04/17/24 1514    Narrative:      CT HEAD WO CONTRAST    Date of Exam: 4/17/2024 3:02 PM EDT    Indication: AMS.    Comparison: None available.    Technique: Axial CT images were obtained of the head without contrast administration.  Coronal reconstructions were performed.  Automated exposure control and iterative reconstruction methods were used.    Findings: Exam limited by patient motion throughout the study. Gray-white matter differentiation is maintained without evidence of an acute infarction. No intracranial mass or mass effect. No extra-axial mass or collection. The ventricles and sulci are   normal in size and configuration. The posterior fossa appears normal. Sellar and suprasellar structures are normal.    Orbital and paranasal soft tissues are normal. The paranasal sinuses, ethmoid air cells, and mastoid air cells are aerated. The bony calvarium appears intact. No acute fractures. No lytic or blastic bony diseases. Cerumen within the external acoustic   canals.      Impression:      Impression: No acute intracranial pathology. Examination limited due to patient motion.          Electronically Signed: Davis Hassan MD    4/17/2024 3:12 PM EDT    Workstation ID: [id number]    XR Chest 1 View [[id number]] Collected: 04/17/24 1407     Updated: 04/17/24 1410    Narrative:      XR CHEST 1 VW    Date of Exam: 4/17/2024 2:04 PM EDT    Indication: AMS Protocol  AMS Protocol    Comparison: None available.    Findings:  No acute airspace disease. Heart size is within normal limits. No pleural effusion or pneumothorax or acute osseous abnormality      Impression:      Impression:  No acute chest finding.      Electronically  Signed: Vane Silva MD    4/17/2024 2:07 PM EDT    Workstation ID: [id number]            Results for orders placed during the hospital encounter of 04/17/24    XR Chest 1 View    Narrative  XR CHEST 1 VW    Date of Exam: 4/17/2024 2:04 PM EDT    Indication: AMS Protocol  AMS Protocol    Comparison: None available.    Findings:  No acute airspace disease. Heart size is within normal limits. No pleural effusion or pneumothorax or acute osseous abnormality    Impression  Impression:  No acute chest finding.      Electronically Signed: Vane Silva MD  4/17/2024 2:07 PM EDT  Workstation ID: [id number]            ASSESSMENT / PLAN      Overdose    TYLER-likely ATN related to rhabdomyolysis  Acute rhabdomyolysis-traumatic/fall.  Found on floor.  Aggressive IV hydration.  Trend CK  Drug overdose-Benadryl/NyQuil/DayQuil  Hypokalemia on admission-resolved  Mild metabolic acidosis-resolved     CR better, CK trending down  Continue IVF x 24 hrss  Trend CK  Monitor renal function fluid status electrolytes  Discussed with family and RN at bedside        Maikol Mclena MD  [facility]  [phone]  04/20/24  09:24 EDT

## 2024-04-20 NOTE — PLAN OF CARE
Goal Outcome Evaluation:      VSS on room air, IVF, pt denies pain/nausea, flat affect, pt did not voice SI this shift, sitter at bedside, pt did voice he wants to leave- no attempts made to leave                                         impaired

## 2024-04-20 NOTE — PLAN OF CARE
Goal Outcome Evaluation:              Outcome Evaluation: Pt. has been resting throughout the shift. Pt. remains in SI precautions with sitter at bedside. Pt. remains on IV fluids and IV abx. Pt. has not had any complaints throughout the shift.

## 2024-04-20 NOTE — CASE MANAGEMENT/SOCIAL WORK
Continued Stay Note  [facility]     Patient Name: Keagan Valenzuela  MRN: [id number]  Today's Date: 4/20/2024    Admit Date: 4/17/2024    Plan: Inpatient psych pending availability.   Discharge Plan       Row Name 04/20/24 1252       Plan    Plan Comments SI precautions.  SItter at bedside.  updated SW of need to follow for IP psych placement                   Discharge Codes    No documentation.                 Expected Discharge Date and Time       Expected Discharge Date Expected Discharge Time    Apr 20, 2024               Deena Crowe RN

## 2024-04-20 NOTE — PROGRESS NOTES
Chief complaint Depression, SI    Subjective .     History of present illness:   The patient is a 38 y.o. male who was admitted secondary to overdose.  Patient was found on the floor unconscious.  Past medical history includes depression.  Patient reports he intentionally overdosed on Benadryl and NyQuil and rubbing alcohol with the intent to harm himself and die.  No other previous attempts   Denies specific plan      Patient has a long history of depression hospitalized in 2018 and seen by [facility].  Patient reports that he started an antidepressant at that time cannot recall which one.  Not currently taking any medications at this time.     Patient reports misusing cold and cough medicine to get high.  Evasive with details or length of time.  History of hallucinations related to cough and cold medicine misuse.  Denies other substance use including alcohol     Family at bedside report patient lives alone.  Irritable.  Limited support  Does not work, mostly isolated.  Has been struggling with depression for some time.     Patient endorses SI, denies plan  Denies anxiety  Depression associated with low mood, low energy, feels helpless helpless.  Denies AVH/HI  Not aggressive or acutely agitated at this time    Today patient reports continues to endorse SI, with plan to jump off a bridge.  Denies access  Patient's family at bedside again today  Zoloft was started yesterday, patient appears to be tolerating  Patient is often selectively mute, appears to be bored, not interested in interview.  Interview somewhat limited  Per nursing staff no events overnight  Not acutely agitated or aggressive    The following portions of the patient's history were reviewed and updated as appropriate: allergies, current medications, past family history, past medical history, past social history, past surgical history and problem list.    History    Objective     Vital Signs   /72   Pulse 106   Temp 99.1  "°F (37.3 °C) (Oral)   Resp 17   Ht 177.8 cm (70\")   Wt 58 kg (127 lb 13.9 oz)   SpO2 97%   BMI 18.35 kg/m²       MENTAL STATUS EXAM   General Appearance:  Cleanly groomed and dressed  Attitude:  Evasive  Motor Activity:  Normal gait, posture  Speech:  Minimal spontaneity and selectively mute  Mood and affect:  Irritable and depressed  Thought Process:  Goal-directed and linear  Associations/ Thought Content:  No delusions  Hallucinations:  None  Suicidal Ideations:  Other  Other Comment:  Endorses SI with vague plan, no access  Homicidal Ideation:  Not present  Sensorium:  Alert and clear  Orientation:  Person, place, time and situation  Attention Span/ Concentration:  Selective attention  Intellectual Functioning:  Average range  Insight:  Limited  Judgement:  Limited  Reliability:  Fair  Impulse Control:  Fair         Assessment & Plan       Overdose       Assessment:  Depression with SI, intentional overdose      1. Altered mental status, unspecified altered mental status type    2. Non-traumatic rhabdomyolysis    3. Hematemesis, unspecified whether nausea present    4. Anticholinergic drug overdose, undetermined intent, initial encounter      Treatment Plan:  Pt presents after intentional OD. Pt suffers from severe depression. Not currently on medications.  Patient would likely benefit from SSRI for depression and appropriate at this time for inpatient psychiatric treatment.  Agreeable to inpatient psychiatric treatment  Agreeable to medication management  Endorsing SI, with vague plan of jumping off bridge.   Family has safety concerns if patient were to leave hospital, agreeable to be here at this time, can initiate 24-hour to 48-hour medical hold if patient tries to leave before medically clear.  Continue SI precautions and sitter  Increase sertraline 50 mg daily for depression  Will follow  Treatment Plan discussed with: Patient and Family    I discussed the patients findings and my recommendations with " patient, family, and nursing staff    I have reviewed and approved the behavioral health treatment plans and problem list. Yes  Thank you for the consult   Referring MD has access to consult report and progress notes in EMR     Umm Astorga DNP, APRN  04/20/24  12:35 EDT

## 2024-04-20 NOTE — PROGRESS NOTES
[facility] MEDICINE SERVICE  DAILY PROGRESS NOTE    NAME: Keagan Valenzuela  : 1985  MRN: [id number]      LOS: 3 days     PROVIDER OF SERVICE: Gerardo Toledo MD    Chief Complaint: Overdose    History of Present Illness:   This is a 37 years old male with no significant past medical history who was brought in after he was found unconscious at home.  Per family he has been acting strange and isolating himself, looks depressed and was recently seen in the ED, he was discharged home and instructed to stay away from Benadryl and NyQuil.  Given his family did not hear from him today EMS was called, his stool was broken and patient was followed unresponsive on the floor with empty bottles of Benadryl, NyQuil, DayQuil and he was then brought to the ED for further workup and management     Vital signs on presentation showed a blood pressure of 135/95, afebrile, respiratory rate 16 saturating 97% on room air with a heart rate of 100.      Labs showed creatinine kinase 13,383, sodium 140, potassium 3.4, bicarb 25, anion gap 16, WBC 13.3 otherwise CBC within normal limit, urinalysis was positive for WBC and protein, urine culture was negative.     Chest x-ray showed no acute chest finding  CT abdomen and pelvis showed generalized air of fluid distention of the bowel, greatest in the ascending colon and transverse colon. Findings favor the presence of diffuse ileus.  There is some layering high density material within the lumen of the stomach, which could represent ingested material or perhaps could represent a small amount of blood products, given the provided history of hematemesis. Tiny dots of air are seen along the periphery of the ascending colon, favored represent air within the lumen of the bowel rather than that of pneumatosis.     Subjective:     Interval History:    .  Patient remained stable overnight.  Today's lab work is pending.  Discussed with father.  Patient feels depressed.   Was admitted with suicide attempt.  Psychiatry is following    4/20.  Patient remained stable overnight,, very withdrawn.  CK has improved to 14 K and creatinine is normal.  Encouraged him to drink plenty of fluid    Review of Systems:   Negative except what is listed above     Objective:     Vital Signs  Temp:  [97.9 °F (36.6 °C)-98.4 °F (36.9 °C)] 97.9 °F (36.6 °C)  Heart Rate:  [] 72  Resp:  [13-18] 13  BP: (110-130)/(61-86) 110/72   Body mass index is 18.35 kg/m².    Physical Exam    General Appearance:    Alert, cooperative, in no acute distress   Head:    Normocephalic, without obvious abnormality, atraumatic   Eyes:            conjunctivae and sclerae normal, no   icterus, no pallor, corneas  clear, PERRLA   Neck:   No adenopathy, supple, trachea midline, no thyromegaly, no   carotid bruit, no JVD   Lungs:     Clear to auscultation,respirations regular, even and                  unlabored    Heart:    Regular rhythm and normal rate, normal S1 and S2, no            murmur, no gallop, no rub, no click   Abdomen:     Normal bowel sounds, no masses, no organomegaly, soft        non-tender, non-distended, no guarding, no rebound                No tenderness   Extremities:   Moves all extremities well, no edema, no cyanosis, no             redness   Lymph nodes:   No palpable adenopathy   Neurologic:   Cranial nerves 2 - 12 grossly intact, sensation intact, DTR       present and equal bilaterally       Scheduled Meds   cefTRIAXone, 1,000 mg, Intravenous, Q24H  pantoprazole, 40 mg, Intravenous, Q12H  sertraline, 25 mg, Oral, Daily  sodium chloride, 10 mL, Intravenous, Q12H       PRN Meds     senna-docusate sodium **AND** polyethylene glycol **AND** bisacodyl **AND** bisacodyl    dextrose    dextrose    glucagon (human recombinant)    LORazepam    LORazepam    LORazepam    ondansetron ODT **OR** ondansetron    sodium chloride    sodium chloride    sodium chloride   Infusions  dextrose 5 % and sodium chloride  0.9 %, 150 mL/hr, Last Rate: 150 mL/hr (04/19/24 2133)          Diagnostic Data    Results from last 7 days   Lab Units 04/20/24  0700 04/19/24  1233   WBC 10*3/mm3  --  8.77   HEMOGLOBIN g/dL  --  14.0   HEMATOCRIT %  --  41.4   PLATELETS 10*3/mm3  --  188   GLUCOSE mg/dL 99 103*   CREATININE mg/dL 0.87 0.90   BUN mg/dL 4* 3*   SODIUM mmol/L 144 141   POTASSIUM mmol/L 3.7 3.8   AST (SGOT) U/L  --  272*   ALT (SGPT) U/L  --  76*   ALK PHOS U/L  --  65   BILIRUBIN mg/dL  --  0.3   ANION GAP mmol/L 10.0 9.0       No radiology results for the last day      I have personally reviewed the patient's new results.     Assessment/Plan:     Active and Resolved Problems    Suicide attempt  Rhabdomyolysis  History of alcohol abuse  TYLER  Metabolic acidosis resolved    Suggestion:    4/20.  Patient is medically stable.  Patient can be transferred to psych unit for suicide attempt and ideations.    4/19.  Patient is medically stable.  Psychiatry is following.  Patient will need to go to inpatient psych for suicide attempt.  Continue IV hydration and nephrology is following.  Will discharge patient to psych unit once everything is arranged    DVT prophylaxis:  No DVT prophylaxis order currently exists.         Code status is   Code Status and Medical Interventions:   Ordered at: 04/17/24 1720     Code Status (Patient has no pulse and is not breathing):    CPR (Attempt to Resuscitate)     Medical Interventions (Patient has pulse or is breathing):    Full Support       Plan for disposition: 4/21    Time: 30 minutes    Signature: Electronically signed by Gerardo Toledo MD, 04/20/24, 10:06 EDT.  [facility], [place], operated by [facility] Hospitalist Team

## 2024-04-21 LAB
ALBUMIN SERPL-MCNC: 3.7 G/DL (ref 3.5–5.2)
ANION GAP SERPL CALCULATED.3IONS-SCNC: 12 MMOL/L (ref 5–15)
BUN SERPL-MCNC: 6 MG/DL (ref 6–20)
BUN/CREAT SERPL: 7.5 (ref 7–25)
CALCIUM SPEC-SCNC: 9.3 MG/DL (ref 8.6–10.5)
CHLORIDE SERPL-SCNC: 103 MMOL/L (ref 98–107)
CK SERPL-CCNC: 9233 U/L (ref 20–200)
CO2 SERPL-SCNC: 27 MMOL/L (ref 22–29)
CREAT SERPL-MCNC: 0.8 MG/DL (ref 0.76–1.27)
EGFRCR SERPLBLD CKD-EPI 2021: 115.5 ML/MIN/1.73
GLUCOSE SERPL-MCNC: 85 MG/DL (ref 65–99)
PHOSPHATE SERPL-MCNC: 3.3 MG/DL (ref 2.5–4.5)
POTASSIUM SERPL-SCNC: 3.5 MMOL/L (ref 3.5–5.2)
SODIUM SERPL-SCNC: 142 MMOL/L (ref 136–145)

## 2024-04-21 PROCEDURE — 80069 RENAL FUNCTION PANEL: CPT | Performed by: INTERNAL MEDICINE

## 2024-04-21 PROCEDURE — 82550 ASSAY OF CK (CPK): CPT | Performed by: INTERNAL MEDICINE

## 2024-04-21 PROCEDURE — 25010000002 CEFTRIAXONE PER 250 MG: Performed by: INTERNAL MEDICINE

## 2024-04-21 PROCEDURE — 25810000003 SODIUM CHLORIDE 0.9 % SOLUTION: Performed by: INTERNAL MEDICINE

## 2024-04-21 RX ADMIN — PANTOPRAZOLE SODIUM 40 MG: 40 INJECTION, POWDER, FOR SOLUTION INTRAVENOUS at 17:45

## 2024-04-21 RX ADMIN — Medication 10 ML: at 21:02

## 2024-04-21 RX ADMIN — PANTOPRAZOLE SODIUM 40 MG: 40 INJECTION, POWDER, FOR SOLUTION INTRAVENOUS at 04:37

## 2024-04-21 RX ADMIN — SERTRALINE HYDROCHLORIDE 50 MG: 50 TABLET ORAL at 08:52

## 2024-04-21 RX ADMIN — Medication 10 ML: at 08:53

## 2024-04-21 RX ADMIN — CEFTRIAXONE 1000 MG: 1 INJECTION, POWDER, FOR SOLUTION INTRAMUSCULAR; INTRAVENOUS at 17:45

## 2024-04-21 RX ADMIN — SODIUM CHLORIDE 125 ML/HR: 9 INJECTION, SOLUTION INTRAVENOUS at 08:53

## 2024-04-21 NOTE — CASE MANAGEMENT/SOCIAL WORK
Continued Stay Note  DAV Gallardo     Patient Name: Keagan Valenzuela  MRN: [id number]  Today's Date: 4/21/2024    Admit Date: 4/17/2024    Plan: Inpatient psych pending availability.   Discharge Plan       Row Name 04/21/24 1929       Plan    Plan Comments Received call from Tim Chandler with [facility]. Facility reviewed patient case and they have to decline, they are unable to meet pt medical needs.      Row Name 04/21/24 0463       Plan    Plan Comments [facility] notified that Pt is now agreeable to inpatient psych treatment. [facility] submitted a referral to [facility]. SW awaiting a decision.                      Expected Discharge Date and Time       Expected Discharge Date Expected Discharge Time    Apr 22, 2024               Barb Zafar RN

## 2024-04-21 NOTE — PLAN OF CARE
Problem: Adult Inpatient Plan of Care  Goal: Plan of Care Review  Outcome: Ongoing, Progressing  Goal: Patient-Specific Goal (Individualized)  Outcome: Ongoing, Progressing  Goal: Absence of Hospital-Acquired Illness or Injury  Outcome: Ongoing, Progressing  Intervention: Identify and Manage Fall Risk  Recent Flowsheet Documentation  Taken 4/21/2024 1600 by Leticia Spence RN  Safety Promotion/Fall Prevention:   safety round/check completed   clutter free environment maintained   fall prevention program maintained  Taken 4/21/2024 1500 by Leticia Spence RN  Safety Promotion/Fall Prevention: safety round/check completed  Taken 4/21/2024 1400 by Leticia Spence RN  Safety Promotion/Fall Prevention: safety round/check completed  Taken 4/21/2024 1300 by Leticia Spence RN  Safety Promotion/Fall Prevention:   safety round/check completed   fall prevention program maintained  Taken 4/21/2024 1200 by Leticia Spence RN  Safety Promotion/Fall Prevention: safety round/check completed  Taken 4/21/2024 1100 by Leticia Spence RN  Safety Promotion/Fall Prevention: safety round/check completed  Taken 4/21/2024 1000 by Leticia Spence RN  Safety Promotion/Fall Prevention: safety round/check completed  Taken 4/21/2024 0900 by Leticia Spence RN  Safety Promotion/Fall Prevention: safety round/check completed  Taken 4/21/2024 0800 by Leticia Spence RN  Safety Promotion/Fall Prevention: safety round/check completed  Taken 4/21/2024 0700 by Leticia Spence RN  Safety Promotion/Fall Prevention: safety round/check completed  Intervention: Prevent Skin Injury  Recent Flowsheet Documentation  Taken 4/21/2024 1600 by Leticia Spence RN  Body Position:   position changed independently   weight shifting  Taken 4/21/2024 1400 by Leticia Spence RN  Body Position:   position changed independently   weight shifting  Taken 4/21/2024 1200 by Leticia Spence RN  Body Position:   position changed independently   weight shifting  Taken 4/21/2024 1000 by  Leticia Spence RN  Body Position:   position changed independently   weight shifting  Taken 4/21/2024 0800 by Leticia Spence RN  Body Position:   position changed independently   weight shifting  Skin Protection:   adhesive use limited   tubing/devices free from skin contact  Intervention: Prevent and Manage VTE (Venous Thromboembolism) Risk  Recent Flowsheet Documentation  Taken 4/21/2024 1200 by Leticia Spence RN  Activity Management: activity encouraged  Taken 4/21/2024 0800 by Leticia Spence RN  Activity Management: activity encouraged  Goal: Optimal Comfort and Wellbeing  Outcome: Ongoing, Progressing  Intervention: Provide Person-Centered Care  Recent Flowsheet Documentation  Taken 4/21/2024 1600 by Leticia Spence RN  Trust Relationship/Rapport:   care explained   thoughts/feelings acknowledged   questions answered   reassurance provided   questions encouraged   choices provided  Taken 4/21/2024 1200 by Leticia Spence RN  Trust Relationship/Rapport:   care explained   thoughts/feelings acknowledged   reassurance provided   questions encouraged   questions answered   empathic listening provided   emotional support provided   choices provided  Taken 4/21/2024 0800 by Leticia Spence RN  Trust Relationship/Rapport:   care explained   thoughts/feelings acknowledged   questions answered   questions encouraged   reassurance provided   emotional support provided   empathic listening provided  Goal: Readiness for Transition of Care  Outcome: Ongoing, Progressing     Problem: Restraint, Violent or Self-Destructive  Goal: Absence of Harm or Injury  Outcome: Ongoing, Progressing  Intervention: Implement Least Restrictive Safety Strategies  Recent Flowsheet Documentation  Taken 4/21/2024 0800 by Leticia Spence RN  Diversional Activities: television  Intervention: Protect Dignity, Rights, and Personal Wellbeing  Recent Flowsheet Documentation  Taken 4/21/2024 1600 by Leticia Spence RN  Trust Relationship/Rapport:   care  explained   thoughts/feelings acknowledged   questions answered   reassurance provided   questions encouraged   choices provided  Taken 4/21/2024 1200 by Leticia Spence RN  Trust Relationship/Rapport:   care explained   thoughts/feelings acknowledged   reassurance provided   questions encouraged   questions answered   empathic listening provided   emotional support provided   choices provided  Taken 4/21/2024 0800 by Leticia Spence RN  Trust Relationship/Rapport:   care explained   thoughts/feelings acknowledged   questions answered   questions encouraged   reassurance provided   emotional support provided   empathic listening provided  Intervention: Protect Skin and Joint Integrity  Recent Flowsheet Documentation  Taken 4/21/2024 1600 by Leticia Spence RN  Body Position:   position changed independently   weight shifting  Taken 4/21/2024 1400 by Leticia Spence RN  Body Position:   position changed independently   weight shifting  Taken 4/21/2024 1200 by Leticia Spence RN  Body Position:   position changed independently   weight shifting  Taken 4/21/2024 1000 by Leticia Spence RN  Body Position:   position changed independently   weight shifting  Taken 4/21/2024 0800 by Leticia Spence RN  Body Position:   position changed independently   weight shifting     Problem: Adjustment to Illness (Sepsis/Septic Shock)  Goal: Optimal Coping  Outcome: Ongoing, Progressing     Problem: Bleeding (Sepsis/Septic Shock)  Goal: Absence of Bleeding  Outcome: Ongoing, Progressing     Problem: Glycemic Control Impaired (Sepsis/Septic Shock)  Goal: Blood Glucose Level Within Desired Range  Outcome: Ongoing, Progressing     Problem: Infection Progression (Sepsis/Septic Shock)  Goal: Absence of Infection Signs and Symptoms  Outcome: Ongoing, Progressing  Intervention: Promote Recovery  Recent Flowsheet Documentation  Taken 4/21/2024 1200 by Leticia Spence RN  Activity Management: activity encouraged  Taken 4/21/2024 0800 by Leticia Spence  RN  Activity Management: activity encouraged     Problem: Nutrition Impaired (Sepsis/Septic Shock)  Goal: Optimal Nutrition Intake  Outcome: Ongoing, Progressing     Problem: Skin Injury Risk Increased  Goal: Skin Health and Integrity  Outcome: Ongoing, Progressing  Intervention: Optimize Skin Protection  Recent Flowsheet Documentation  Taken 4/21/2024 0800 by Leticia Spence RN  Pressure Reduction Techniques:   frequent weight shift encouraged   weight shift assistance provided   pressure points protected  Head of Bed (HOB) Positioning: HOB at 30 degrees  Pressure Reduction Devices: pressure-redistributing mattress utilized  Skin Protection:   adhesive use limited   tubing/devices free from skin contact     Problem: Fall Injury Risk  Goal: Absence of Fall and Fall-Related Injury  Outcome: Ongoing, Progressing  Intervention: Promote Injury-Free Environment  Recent Flowsheet Documentation  Taken 4/21/2024 1600 by Leticia Spence RN  Safety Promotion/Fall Prevention:   safety round/check completed   clutter free environment maintained   fall prevention program maintained  Taken 4/21/2024 1500 by Leticia Spence RN  Safety Promotion/Fall Prevention: safety round/check completed  Taken 4/21/2024 1400 by Leticia Spence RN  Safety Promotion/Fall Prevention: safety round/check completed  Taken 4/21/2024 1300 by Leticia Spence RN  Safety Promotion/Fall Prevention:   safety round/check completed   fall prevention program maintained  Taken 4/21/2024 1200 by Leticia Spence RN  Safety Promotion/Fall Prevention: safety round/check completed  Taken 4/21/2024 1100 by Leticia Spence RN  Safety Promotion/Fall Prevention: safety round/check completed  Taken 4/21/2024 1000 by Leticia Spence RN  Safety Promotion/Fall Prevention: safety round/check completed  Taken 4/21/2024 0900 by Leticia Spence RN  Safety Promotion/Fall Prevention: safety round/check completed  Taken 4/21/2024 0800 by Leticia Spence RN  Safety Promotion/Fall Prevention:  safety round/check completed  Taken 4/21/2024 0700 by Leticia Spence RN  Safety Promotion/Fall Prevention: safety round/check completed     Problem: Suicide Risk  Goal: Absence of Self-Harm  Outcome: Ongoing, Progressing  Intervention: Assess Risk to Self and Maintain Safety  Recent Flowsheet Documentation  Taken 4/21/2024 1200 by Leticia Spence RN  Self-Harm Prevention:   environmental self-harm risks assessed   observed one-to-one  Taken 4/21/2024 1100 by Leticia Spence RN  Self-Harm Prevention:   environmental self-harm risks assessed   observed one-to-one  Taken 4/21/2024 1000 by Leticia Spence RN  Self-Harm Prevention:   environmental self-harm risks assessed   observed one-to-one  Taken 4/21/2024 0900 by Leticia Spence RN  Self-Harm Prevention:   observed one-to-one   environmental self-harm risks assessed  Taken 4/21/2024 0800 by Leticia Spence RN  Self-Harm Prevention:   observed one-to-one   environmental self-harm risks assessed  Goal: Absence of Self-Harm  Outcome: Ongoing, Progressing  Intervention: Assess Risk to Self and Maintain Safety  Recent Flowsheet Documentation  Taken 4/21/2024 1200 by Leticia Spence RN  Self-Harm Prevention:   environmental self-harm risks assessed   observed one-to-one  Taken 4/21/2024 1100 by eLticia Spence RN  Self-Harm Prevention:   environmental self-harm risks assessed   observed one-to-one  Taken 4/21/2024 1000 by Leticia Spence RN  Self-Harm Prevention:   environmental self-harm risks assessed   observed one-to-one  Taken 4/21/2024 0900 by Leticia Spence RN  Self-Harm Prevention:   observed one-to-one   environmental self-harm risks assessed  Taken 4/21/2024 0800 by Leticia Spence RN  Self-Harm Prevention:   observed one-to-one   environmental self-harm risks assessed   Goal Outcome Evaluation:         Continues to have sitter at bedside, All suicide ideation precautions in place. Patient remains free of harm through out shift. He says he is feeling very upset with  himself, but no thoughts of self harm during this shift. He is agreeable and would like to go to inpatient psychiatric admission.

## 2024-04-21 NOTE — PLAN OF CARE
Goal Outcome Evaluation:         Continues to have sitter at bedside on door side. Calm and cooperative this shift. All ideation precautions in place. No c/o pain.

## 2024-04-21 NOTE — DISCHARGE PLACEMENT REQUEST
"Keagan Rubin (38 y.o. Male)       Date of Birth   1985    Social Security Number       Address   [address] [place] [facility]    Home Phone   [phone]    MRN   [id number]       Mormon   None    Marital Status   Single                            Admission Date   24    Admission Type   Emergency    Admitting Provider   Miguel Angel Eugene MD    Attending Provider   Gerardo Toledo MD    Department, Room/Bed   [facility] 3C MEDICAL INPATIENT, 364/1       Discharge Date       Discharge Disposition       Discharge Destination                                 Attending Provider: Gerardo Toledo MD    Allergies: No Known Allergies    Isolation: None   Infection: None   Code Status: CPR    Ht: 177.8 cm (70\")   Wt: 58 kg (127 lb 13.9 oz)    Admission Cmt: None   Principal Problem: Overdose [T50.901A]                   Active Insurance as of 2024       Primary Coverage       Payor Plan Insurance Group Employer/Plan Group    [employer] BLUE CROSS AN[employer]M BLUE CROSS BLUE SHIELD PPO [id number]       Payor Plan Address Payor Plan Phone Number Payor Plan Fax Number Effective Dates    [address] [phone]  2022 - None Entered    [place] [id number]         Subscriber Name Subscriber Birth Date Member ID       KEAGAN RUBIN 1985 [id number]                     Emergency Contacts        (Rel.) Home Phone Work Phone Mobile Phone    PILLO RUBIN (Father) -- -- [phone]    shad rubin (Mother) -- -- [phone]    margarito rubin -- -- [phone]                 History & Physical        Anitra Martino MD at [address] [place] [address] Medicine Services  History & Physical    Patient Name: Keagan Rubin  : 1985  MRN: [id number]  Primary Care Physician:  Keri Shrestha APRN  Date of admission: 2024  Date and Time of Service: 2024    Subjective      Chief " Complaint: Unresponsive    History of Present Illness:   This is a 37 years old male with no significant past medical history who was brought in after he was found unconscious at home.  Per family he has been acting strange and isolating himself, looks depressed and was recently seen in the ED, he was discharged home and instructed to stay away from Benadryl and NyQuil.  Given his family did not hear from him today EMS was called, his stool was broken and patient was followed unresponsive on the floor with empty bottles of Benadryl, NyQuil, DayQuil and he was then brought to the ED for further workup and management    Vital signs on presentation showed a blood pressure of 135/95, afebrile, respiratory rate 16 saturating 97% on room air with a heart rate of 100.     Labs showed creatinine kinase 13,383, sodium 140, potassium 3.4, bicarb 25, anion gap 16, WBC 13.3 otherwise CBC within normal limit, urinalysis was positive for WBC and protein, urine culture was negative.    Chest x-ray showed no acute chest finding  CT abdomen and pelvis showed generalized air of fluid distention of the bowel, greatest in the ascending colon and transverse colon. Findings favor the presence of diffuse ileus.  There is some layering high density material within the lumen of the stomach, which could represent ingested material or perhaps could represent a small amount of blood products, given the provided history of hematemesis. Tiny dots of air are seen along the periphery of the ascending colon, favored represent air within the lumen of the bowel rather than that of pneumatosis.       eReview of Systems    Personal History     No past medical history on file.    No past surgical history on file.    Family History: family history includes No Known Problems in his father and mother. Otherwise pertinent FHx was reviewed and not pertinent to current issue.    Social History:  reports that he has been smoking cigarettes. He has never used  smokeless tobacco. He reports current alcohol use. Drug use questions deferred to the physician.    Home Medications:  Prior to Admission Medications       None              Allergies:  No Known Allergies    Objective      Vitals:   Temp:  [98.5 °F (36.9 °C)] 98.5 °F (36.9 °C)  Heart Rate:  [100-113] 100  Resp:  [16] 16  BP: (114-155)/(55-95) 135/95  Body mass index is 18.35 kg/m².  Physical Exam    Appearance: No apparent distress, non-toxic appearing   HEENT/Neck: Neck is supple, Extraocular movements intact,   Cardiovascular: Tachycardic with regular rhythm  Pulmonary: Clear to auscultation Bilaterally.   Abdomen: BS+, Soft, non-tender, non-distended.   Ext: No Cyanosis, Clubbing, Edema.   Neuro: Non-focal, Alert & Oriented x 3          Diagnostic Data:  Lab Results (last 24 hours)       Procedure Component Value Units Date/Time    Urine Drug Screen - Urine, Clean Catch [[id number]]  (Normal) Collected: 04/17/24 1416    Specimen: Urine, Clean Catch Updated: 04/17/24 1609     Amphet/Methamphet, Screen Negative     Barbiturates Screen, Urine Negative     Benzodiazepine Screen, Urine Negative     Cocaine Screen, Urine Negative     Opiate Screen Negative     THC, Screen, Urine Negative     Methadone Screen, Urine Negative     Oxycodone Screen, Urine Negative    Narrative:      Negative Thresholds Per Drugs Screened:    Amphetamines                 500 ng/ml  Barbiturates                 200 ng/ml  Benzodiazepines              100 ng/ml  Cocaine                      300 ng/ml  Methadone                    300 ng/ml  Opiates                      300 ng/ml  Oxycodone                    100 ng/ml  THC                           50 ng/ml    The Normal Value for all drugs tested is negative. This report includes final unconfirmed screening results to be used for medical treatment purposes only. Unconfirmed results must not be used for non-medical purposes such as employment or legal testing. Clinical consideration should be  applied to any drug of abuse test, particularly when unconfirmed results are used.          All urine drugs of abuse requests without chain of custody are for medical screening purposes only.  False positives are possible.      Urinalysis, Microscopic Only - Indwelling Urethral Catheter [[id number]]  (Abnormal) Collected: 04/17/24 1416    Specimen: Urine from Indwelling Urethral Catheter Updated: 04/17/24 1520     RBC, UA 0-2 /HPF      WBC, UA 3-5 /HPF      Bacteria, UA None Seen /HPF      Squamous Epithelial Cells, UA 0-2 /HPF      Hyaline Casts, UA 7-12 /LPF      Fine Granular Casts, UA 0-2 /LPF      WBC Clumps, UA Small/1+ /HPF      Methodology Manual Light Microscopy    [place] Draw [[id number]] Collected: 04/17/24 1405    Specimen: Blood Updated: 04/17/24 1515    Narrative:      The following orders were created for panel order [place] Draw.  Procedure                               Abnormality         Status                     ---------                               -----------         ------                     Green Top (Gel)[[id number]]                                  Final result               Lavender Top[[id number]]                                     Final result               Gold Top - SST[[id number]]                                   Final result               Light Blue Top[[id number]]                                   Final result                 Please view results for these tests on the individual orders.    Green Top (Gel) [[id number]] Collected: 04/17/24 1405    Specimen: Blood Updated: 04/17/24 1515     Extra Tube Hold for add-ons.     Comment: Auto resulted.       Lavender Top [[id number]] Collected: 04/17/24 1405    Specimen: Blood Updated: 04/17/24 1515     Extra Tube hold for add-on     Comment: Auto resulted       Light Blue Top [[id number]] Collected: 04/17/24 1405    Specimen: Blood Updated: 04/17/24 1515     Extra Tube Hold for add-ons.     Comment: Auto resulted       CK [[id number]]   (Abnormal) Collected: 04/17/24 1405    Specimen: Blood Updated: 04/17/24 1446     Creatine Kinase 13,383 U/L     Ethanol [[id number]] Collected: 04/17/24 1405    Specimen: Blood Updated: 04/17/24 1444     Ethanol % <0.010 %     Narrative:      Plasma Ethanol Clinical Symptoms:    ETOH (%)               Clinical Symptom  .01-.05              No apparent influence  .03-.12              Euphoria, Diminished judgment and attention   .09-.25              Impaired comprehension, Muscle incoordination  .18-.30              Confusion, Staggered gait, Slurred speech  .25-.40              Markedly decreased response to stimuli, unable to stand or                        walk, vomitting, sleep or stupor  .35-.50              Comatose, Anesthesia, Subnormal body temperature        Gold Top - SST [[id number]] Collected: 04/17/24 1405    Specimen: Blood Updated: 04/17/24 1439     Extra Tube hold    POC Glucose Once [[id number]]  (Normal) Collected: 04/17/24 1435    Specimen: Blood Updated: 04/17/24 1436     Glucose 100 mg/dL      Comment: Serial Number: [id number]:  [id number]       Comprehensive Metabolic Panel [[id number]]  (Abnormal) Collected: 04/17/24 1405    Specimen: Blood Updated: 04/17/24 1434     Glucose 92 mg/dL      BUN 10 mg/dL      Creatinine 1.20 mg/dL      Sodium 140 mmol/L      Potassium 3.4 mmol/L      Comment: Slight hemolysis detected by analyzer. Result may be falsely elevated.        Chloride 99 mmol/L      CO2 25.0 mmol/L      Calcium 10.0 mg/dL      Total Protein 7.1 g/dL      Albumin 4.7 g/dL      ALT (SGPT) 31 U/L      AST (SGOT) 122 U/L      Alkaline Phosphatase 79 U/L      Total Bilirubin 0.6 mg/dL      Globulin 2.4 gm/dL      A/G Ratio 2.0 g/dL      BUN/Creatinine Ratio 8.3     Anion Gap 16.0 mmol/L      eGFR 78.9 mL/min/1.73     Narrative:      GFR Normal >60  Chronic Kidney Disease <60  Kidney Failure <15      Urinalysis With Microscopic If Indicated (No Culture) - Indwelling Urethral Catheter  [[id number]]  (Abnormal) Collected: 04/17/24 1416    Specimen: Urine from Indwelling Urethral Catheter Updated: 04/17/24 1431     Color, UA Yellow     Appearance, UA Clear     pH, UA 5.5     Specific Gravity, UA 1.020     Glucose, UA Negative     Ketones, UA Negative     Bilirubin, UA Negative     Blood, UA Large (3+)     Protein, UA 30 mg/dL (1+)     Leuk Esterase, UA Negative     Nitrite, UA Negative     Urobilinogen, UA 1.0 E.U./dL    CBC & Differential [[id number]]  (Abnormal) Collected: 04/17/24 1405    Specimen: Blood Updated: 04/17/24 1412    Narrative:      The following orders were created for panel order CBC & Differential.  Procedure                               Abnormality         Status                     ---------                               -----------         ------                     CBC Auto Differential[[id number]]        Abnormal            Final result                 Please view results for these tests on the individual orders.    CBC Auto Differential [[id number]]  (Abnormal) Collected: 04/17/24 1405    Specimen: Blood Updated: 04/17/24 1412     WBC 13.31 10*3/mm3      RBC 4.84 10*6/mm3      Hemoglobin 14.7 g/dL      Hematocrit 40.9 %      MCV 84.5 fL      MCH 30.4 pg      MCHC 35.9 g/dL      RDW 12.5 %      RDW-SD 38.2 fl      MPV 11.0 fL      Platelets 213 10*3/mm3      Neutrophil % 83.7 %      Lymphocyte % 11.2 %      Monocyte % 4.5 %      Eosinophil % 0.1 %      Basophil % 0.1 %      Immature Grans % 0.4 %      Neutrophils, Absolute 11.15 10*3/mm3      Lymphocytes, Absolute 1.49 10*3/mm3      Monocytes, Absolute 0.60 10*3/mm3      Eosinophils, Absolute 0.01 10*3/mm3      Basophils, Absolute 0.01 10*3/mm3      Immature Grans, Absolute 0.05 10*3/mm3      nRBC 0.0 /100 WBC              Imaging Results (Last 24 Hours)       Procedure Component Value Units Date/Time    CT Abdomen Pelvis With Contrast [[id number]] Collected: 04/17/24 1509     Updated: 04/17/24 1520    Narrative:      CT ABDOMEN  PELVIS W CONTRAST    Date of Exam: 4/17/2024 3:02 PM EDT    Indication: vomiting blood. AMS.    Comparison: None available.    Technique: Axial CT images were obtained of the abdomen and pelvis following the uneventful intravenous administration of iodinated contrast. Sagittal and coronal reconstructions were performed.  Automated exposure control and iterative reconstruction   methods were used.        Findings:  Layering high density material is seen within the stomach, which could represent blood products, given the history of hematemesis, or could represent ingested high density material within the gastric lumen. No gastric mass or focal gastric inflammatory   change is appreciated.    There is moderate air-fluid distention of the ascending colon and transverse colon and splenic flexure of the colon. More mild air-fluid distention is seen within the large and small bowel loops. The overall pattern is favored to represent generalized   ileus.    The appendix is filled with air and appears normal (series 4 image 87).    Small dots of air are seen along the walls of the ascending colon, thought to be within the lumen of the bowel rather than representing bowel wall pneumatosis.    No free air or free fluid or adenopathy is seen within the abdomen/pelvis.    Urinary bladder, prostate and rectum are normal.    Liver, gallbladder, spleen, pancreas, adrenals, and kidneys are within normal limits.    The lung bases are clear. The heart size is within normal limits.    No acute or suspicious osseous abnormalities are identified.      Impression:      Impression:    1. Generalized air of fluid distention of the bowel, greatest in the ascending colon and transverse colon. Findings favor the presence of diffuse ileus.  2. There is some layering high density material within the lumen of the stomach, which could represent ingested material or perhaps could represent a small amount of blood products, given the provided history  of hematemesis. No focal gastric inflammatory   change or mass lesion is identified.  3. Tiny dots of air are seen along the periphery of the ascending colon, favored represent air within the lumen of the bowel rather than that of pneumatosis. However, to be on the safe side, I would recommend short-term abdominal x-ray/KUB follow-up to   ensure stability, improvement or resolution.          Electronically Signed: Vane Silva MD    4/17/2024 3:18 PM EDT    Workstation ID: [id number]    CT Head Without Contrast [[id number]] Collected: 04/17/24 1510     Updated: 04/17/24 1514    Narrative:      CT HEAD WO CONTRAST    Date of Exam: 4/17/2024 3:02 PM EDT    Indication: AMS.    Comparison: None available.    Technique: Axial CT images were obtained of the head without contrast administration.  Coronal reconstructions were performed.  Automated exposure control and iterative reconstruction methods were used.    Findings: Exam limited by patient motion throughout the study. Gray-white matter differentiation is maintained without evidence of an acute infarction. No intracranial mass or mass effect. No extra-axial mass or collection. The ventricles and sulci are   normal in size and configuration. The posterior fossa appears normal. Sellar and suprasellar structures are normal.    Orbital and paranasal soft tissues are normal. The paranasal sinuses, ethmoid air cells, and mastoid air cells are aerated. The bony calvarium appears intact. No acute fractures. No lytic or blastic bony diseases. Cerumen within the external acoustic   canals.      Impression:      Impression: No acute intracranial pathology. Examination limited due to patient motion.          Electronically Signed: Davis Hassan MD    4/17/2024 3:12 PM EDT    Workstation ID: [id number]    XR Chest 1 View [[id number]] Collected: 04/17/24 1407     Updated: 04/17/24 1410    Narrative:      XR CHEST 1 VW    Date of Exam: 4/17/2024 2:04 PM EDT    Indication: AMS  Protocol  AMS Protocol    Comparison: None available.    Findings:  No acute airspace disease. Heart size is within normal limits. No pleural effusion or pneumothorax or acute osseous abnormality      Impression:      Impression:  No acute chest finding.      Electronically Signed: Vane Silva MD    4/17/2024 2:07 PM EDT    Workstation ID: [id number]              Assessment & Plan    Drug overdose  -Family found empty bottles of Benadryl, NyQuil, DayQuil while patient was found unconscious.  -He is awake now, still confused and mildly tachycardic.  -? hematemesis on presentation.  -Continue to monitor patient on telemetry, continue aggressive volume resuscitation  -Gastroenterology has been consulted.        Rhabdomyolysis  -Rhabdomyolysis likely from laying on the floor, it is unknown how long he was down.    -Continue volume resuscitation and trend creatinine kinase.  -continue to monitor       Leukocytosis  -Mild leukocytosis, with WBC on urinalysis, could be hemoconcentration however will cover with ceftriaxone for now until patient is more alert to answer questions.      Hypokalemia  -Replete with 40 mEq of potassium chloride x 1          DVT prophylaxis:  Full code  Continue inpatient level care  Medical DVT prophylaxis orders are signed and held.            Signature:     This document has been electronically signed by Anitra Martino MD on April 17, 2024 17:21 EDT   [facility] Hospitalist Team    Electronically signed by Anitra Martino MD at 04/17/24 1750          Physician Progress Notes (last 24 hours)        Umm Astorga, DNP, APRN at 04/21/24 1454              Chief complaint Depression, SI    Subjective .     History of present illness:   The patient is a 38 y.o. male who was admitted secondary to overdose.  Patient was found on the floor unconscious.  Past medical history includes depression.  Patient reports he intentionally overdosed on Benadryl and NyQuil and rubbing alcohol with the  "intent to harm himself and die.  No other previous attempts   Denies specific plan      Patient has a long history of depression hospitalized in 2018 and seen by [facility].  Patient reports that he started an antidepressant at that time cannot recall which one.  Not currently taking any medications at this time.     Patient reports misusing cold and cough medicine to get high.  Evasive with details or length of time.  History of hallucinations related to cough and cold medicine misuse.  Denies other substance use including alcohol     Family at bedside report patient lives alone.  Irritable.  Limited support  Does not work, mostly isolated.  Has been struggling with depression for some time.     Patient endorses SI, denies plan  Denies anxiety  Depression associated with low mood, low energy, feels helpless helpless.  Denies AVH/HI  Not aggressive or acutely agitated at this time    Today   Patient is often selectively mute, appears to be bored, not interested in interview.  Patient's family at bedside again today, reports patient has made several suicidal comments to them.  Patient's family report fearing for his safety if he were to discharge from hospital.  Interview somewhat limited  Per nursing staff no events overnight  Not acutely agitated or aggressive  Patient reports eating and drinking  No issues with sleep  Denies anxiety symptoms    The following portions of the patient's history were reviewed and updated as appropriate: allergies, current medications, past family history, past medical history, past social history, past surgical history and problem list.    History    Objective     Vital Signs   /67 (BP Location: Left arm, Patient Position: Lying)   Pulse 87   Temp 97.6 °F (36.4 °C) (Oral)   Resp 16   Ht 177.8 cm (70\")   Wt 58 kg (127 lb 13.9 oz)   SpO2 95%   BMI 18.35 kg/m²       MENTAL STATUS EXAM   General Appearance:  Cleanly groomed and dressed  Attitude:  " Evasive  Motor Activity:  Normal gait, posture  Speech:  Minimal spontaneity and selectively mute  Mood and affect:  Irritable and depressed  Thought Process:  Goal-directed and linear  Associations/ Thought Content:  No delusions  Hallucinations:  None  Suicidal Ideations:  Other  Other Comment:  Endorses SI with vague plan, no access  Homicidal Ideation:  Not present  Sensorium:  Alert and clear  Orientation:  Person, place, time and situation  Attention Span/ Concentration:  Selective attention  Intellectual Functioning:  Average range  Insight:  Limited  Judgement:  Limited  Reliability:  Fair  Impulse Control:  Fair         Assessment & Plan       Overdose       Assessment:  Depression with SI, intentional overdose      1. Altered mental status, unspecified altered mental status type    2. Non-traumatic rhabdomyolysis    3. Hematemesis, unspecified whether nausea present    4. Anticholinergic drug overdose, undetermined intent, initial encounter      Treatment Plan:  Pt presents after intentional OD. Pt suffers from severe depression. Not currently on medications.  Patient would likely benefit from SSRI for depression and appropriate at this time for inpatient psychiatric treatment.  Agreeable to inpatient psychiatric treatment  Agreeable to medication management  Endorsing SI, with vague plan of jumping off bridge.   Family has safety concerns if patient were to leave hospital, agreeable to be here at this time, can initiate 24-hour to 48-hour medical hold if patient tries to leave before medically clear.  Continue SI precautions and sitter  Continue sertraline 50 mg daily for depression  Will follow  Treatment Plan discussed with: Patient and Family    I discussed the patients findings and my recommendations with patient, family, and nursing staff    I have reviewed and approved the behavioral health treatment plans and problem list. Yes  Thank you for the consult   Referring MD has access to consult report  and progress notes in EMR     mUm Astorga DNP, APRN  24  14:54 EDT             Electronically signed by Umm Astorga DNP, APRN at 24 [id number]       Gerardo Toledo MD at 24 1049              [facility] MEDICINE SERVICE  DAILY PROGRESS NOTE    NAME: Keagan Valeznuela  : 1985  MRN: [id number]      LOS: 4 days     PROVIDER OF SERVICE: Gerardo Toledo MD    Chief Complaint: Overdose    History of Present Illness:   This is a 37 years old male with no significant past medical history who was brought in after he was found unconscious at home.  Per family he has been acting strange and isolating himself, looks depressed and was recently seen in the ED, he was discharged home and instructed to stay away from Benadryl and NyQuil.  Given his family did not hear from him today EMS was called, his stool was broken and patient was followed unresponsive on the floor with empty bottles of Benadryl, NyQuil, DayQuil and he was then brought to the ED for further workup and management     Vital signs on presentation showed a blood pressure of 135/95, afebrile, respiratory rate 16 saturating 97% on room air with a heart rate of 100.      Labs showed creatinine kinase 13,383, sodium 140, potassium 3.4, bicarb 25, anion gap 16, WBC 13.3 otherwise CBC within normal limit, urinalysis was positive for WBC and protein, urine culture was negative.     Chest x-ray showed no acute chest finding  CT abdomen and pelvis showed generalized air of fluid distention of the bowel, greatest in the ascending colon and transverse colon. Findings favor the presence of diffuse ileus.  There is some layering high density material within the lumen of the stomach, which could represent ingested material or perhaps could represent a small amount of blood products, given the provided history of hematemesis. Tiny dots of air are seen along the periphery of the ascending colon, favored represent air within the lumen  of the bowel rather than that of pneumatosis.     Subjective:     Interval History:    4/19.  Patient remained stable overnight.  Today's lab work is pending.  Discussed with father.  Patient feels depressed.  Was admitted with suicide attempt.  Psychiatry is following    4/20.  Patient remained stable overnight,, very withdrawn.  CK has improved to 14 K and creatinine is normal.  Encouraged him to drink plenty of fluid    4/22.  Patient remained stable overnight.  Discussed with the staff and discontinued IV fluids    Review of Systems:   Negative except what is listed above     Objective:     Vital Signs  Temp:  [97.6 °F (36.4 °C)-99.2 °F (37.3 °C)] 97.6 °F (36.4 °C)  Heart Rate:  [] 77  Resp:  [15-19] 16  BP: ()/(52-83) 90/52   Body mass index is 18.35 kg/m².    Physical Exam    General Appearance:    Alert, cooperative, in no acute distress   Head:    Normocephalic, without obvious abnormality, atraumatic   Eyes:            conjunctivae and sclerae normal, no   icterus, no pallor, corneas  clear, PERRLA   Neck:   No adenopathy, supple, trachea midline, no thyromegaly, no   carotid bruit, no JVD   Lungs:     Clear to auscultation,respirations regular, even and                  unlabored    Heart:    Regular rhythm and normal rate, normal S1 and S2, no            murmur, no gallop, no rub, no click   Abdomen:     Normal bowel sounds, no masses, no organomegaly, soft        non-tender, non-distended, no guarding, no rebound                No tenderness   Extremities:   Moves all extremities well, no edema, no cyanosis, no             redness   Lymph nodes:   No palpable adenopathy   Neurologic:   Cranial nerves 2 - 12 grossly intact, sensation intact, DTR       present and equal bilaterally       Scheduled Meds   cefTRIAXone, 1,000 mg, Intravenous, Q24H  pantoprazole, 40 mg, Intravenous, Q12H  sertraline, 50 mg, Oral, Daily  sodium chloride, 10 mL, Intravenous, Q12H       PRN Meds     senna-docusate  sodium **AND** polyethylene glycol **AND** bisacodyl **AND** bisacodyl    dextrose    dextrose    glucagon (human recombinant)    LORazepam    LORazepam    LORazepam    ondansetron ODT **OR** ondansetron    sodium chloride    sodium chloride    sodium chloride   Infusions           Diagnostic Data    Results from last 7 days   Lab Units 04/21/24  0436 04/20/24  0700 04/19/24  1233   WBC 10*3/mm3  --   --  8.77   HEMOGLOBIN g/dL  --   --  14.0   HEMATOCRIT %  --   --  41.4   PLATELETS 10*3/mm3  --   --  188   GLUCOSE mg/dL 85   < > 103*   CREATININE mg/dL 0.80   < > 0.90   BUN mg/dL 6   < > 3*   SODIUM mmol/L 142   < > 141   POTASSIUM mmol/L 3.5   < > 3.8   AST (SGOT) U/L  --   --  272*   ALT (SGPT) U/L  --   --  76*   ALK PHOS U/L  --   --  65   BILIRUBIN mg/dL  --   --  0.3   ANION GAP mmol/L 12.0   < > 9.0    < > = values in this interval not displayed.       No radiology results for the last day      I have personally reviewed the patient's new results.     Assessment/Plan:     Active and Resolved Problems    Suicide attempt  Rhabdomyolysis  History of alcohol abuse  TYLER  Metabolic acidosis resolved    Suggestion:    4/21.  At this time IV fluid has been discontinued.  Continue supportive care.  Discharge to inpatient psych unit once everything is arranged    4/20.  Patient is medically stable.  Patient can be transferred to psych unit for suicide attempt and ideations.    4/19.  Patient is medically stable.  Psychiatry is following.  Patient will need to go to inpatient psych for suicide attempt.  Continue IV hydration and nephrology is following.  Will discharge patient to psych unit once everything is arranged    DVT prophylaxis:  No DVT prophylaxis order currently exists.         Code status is   Code Status and Medical Interventions:   Ordered at: 04/17/24 1720     Code Status (Patient has no pulse and is not breathing):    CPR (Attempt to Resuscitate)     Medical Interventions (Patient has pulse or is  "breathing):    Full Support       Plan for disposition:     Time: 30 minutes    Signature: Electronically signed by Gerardo Toledo MD, 24, 10:49 EDT.  [facility] Hospitalist Team    Electronically signed by Gerardo Toledo MD at 24 1050       Maikol Mclean MD at 24 0832          NEPHROLOGY PROGRESS NOTE------[facility]    [facility]  [phone]  Maikol Mclean MD      Patient Care Team:  Keri Shrestha APRN as PCP - General (Nurse Practitioner)  Maikol Mclean MD as Consulting Physician (Nephrology)      Provider:  Maikol Mclean MD  Patient Name: Keagan Valenzuela  :  1985    SUBJECTIVE:  Follow-up TYLER/rhabdomyolysis  No chest pain or shortness of breath    Medication:  cefTRIAXone, 1,000 mg, Intravenous, Q24H  pantoprazole, 40 mg, Intravenous, Q12H  sertraline, 50 mg, Oral, Daily  sodium chloride, 10 mL, Intravenous, Q12H      sodium chloride, 125 mL/hr, Last Rate: 125 mL/hr (24 1510)        OBJECTIVE    Vital Sign Min/Max for last 24 hours  Temp  Min: 97.6 °F (36.4 °C)  Max: 99.2 °F (37.3 °C)   BP  Min: 90/52  Max: 127/83   Pulse  Min: 77  Max: 106   Resp  Min: 15  Max: 19   SpO2  Min: 96 %  Max: 97 %   No data recorded   No data recorded     Flowsheet Rows      Flowsheet Row First Filed Value   Admission Height 177.8 cm (70\") Documented at 2024 1340   Admission Weight 58 kg (127 lb 13.9 oz) Documented at 2024 1340            No intake/output data recorded.  I/O last 3 completed shifts:  In: 1100 [P.O.:1100]  Out: -     Physical Exam:  General Appearance: alert, appears stated age and cooperative  Head: normocephalic, without obvious abnormality and atraumatic  Eyes: conjunctivae and sclerae normal and no icterus  Neck: supple and no JVD  Lungs: clear to auscultation and respirations regular  Heart: regular rhythm & normal rate and normal S1, S2  Chest: Wall no abnormalities " "observed  Abdomen: normal bowel sounds and soft, nontender  Extremities: moves extremities well, no edema, no cyanosis and no redness  Skin: no bleeding, bruising or rash, turgor normal, color normal and no lesions noted  Neurologic: Alert, and oriented. No focal deficits    Labs:    WBC WBC   Date Value Ref Range Status   04/19/2024 8.77 3.40 - 10.80 10*3/mm3 Final   04/18/2024 7.73 3.40 - 10.80 10*3/mm3 Final      HGB Hemoglobin   Date Value Ref Range Status   04/19/2024 14.0 13.0 - 17.7 g/dL Final   04/18/2024 12.8 (L) 13.0 - 17.7 g/dL Final      HCT Hematocrit   Date Value Ref Range Status   04/19/2024 41.4 37.5 - 51.0 % Final   04/18/2024 38.6 37.5 - 51.0 % Final      Platelets No results found for: \"LABPLAT\"   MCV MCV   Date Value Ref Range Status   04/19/2024 88.5 79.0 - 97.0 fL Final   04/18/2024 89.8 79.0 - 97.0 fL Final          Sodium Sodium   Date Value Ref Range Status   04/21/2024 142 136 - 145 mmol/L Final   04/20/2024 144 136 - 145 mmol/L Final   04/19/2024 141 136 - 145 mmol/L Final   04/18/2024 140 136 - 145 mmol/L Final      Potassium Potassium   Date Value Ref Range Status   04/21/2024 3.5 3.5 - 5.2 mmol/L Final   04/20/2024 3.7 3.5 - 5.2 mmol/L Final   04/19/2024 3.8 3.5 - 5.2 mmol/L Final   04/18/2024 3.8 3.5 - 5.2 mmol/L Final      Chloride Chloride   Date Value Ref Range Status   04/21/2024 103 98 - 107 mmol/L Final   04/20/2024 105 98 - 107 mmol/L Final   04/19/2024 103 98 - 107 mmol/L Final   04/18/2024 108 (H) 98 - 107 mmol/L Final      CO2 CO2   Date Value Ref Range Status   04/21/2024 27.0 22.0 - 29.0 mmol/L Final   04/20/2024 29.0 22.0 - 29.0 mmol/L Final   04/19/2024 29.0 22.0 - 29.0 mmol/L Final   04/18/2024 26.0 22.0 - 29.0 mmol/L Final      BUN BUN   Date Value Ref Range Status   04/21/2024 6 6 - 20 mg/dL Final   04/20/2024 4 (L) 6 - 20 mg/dL Final   04/19/2024 3 (L) 6 - 20 mg/dL Final   04/18/2024 9 6 - 20 mg/dL Final      Creatinine Creatinine   Date Value Ref Range Status " "  04/21/2024 0.80 0.76 - 1.27 mg/dL Final   04/20/2024 0.87 0.76 - 1.27 mg/dL Final   04/19/2024 0.90 0.76 - 1.27 mg/dL Final   04/18/2024 1.14 0.76 - 1.27 mg/dL Final      Calcium Calcium   Date Value Ref Range Status   04/21/2024 9.3 8.6 - 10.5 mg/dL Final   04/20/2024 8.9 8.6 - 10.5 mg/dL Final   04/19/2024 8.8 8.6 - 10.5 mg/dL Final   04/18/2024 8.5 (L) 8.6 - 10.5 mg/dL Final      PO4 No components found for: \"PO4\"   Albumin Albumin   Date Value Ref Range Status   04/21/2024 3.7 3.5 - 5.2 g/dL Final   04/20/2024 3.6 3.5 - 5.2 g/dL Final   04/19/2024 3.6 3.5 - 5.2 g/dL Final   04/18/2024 3.5 3.5 - 5.2 g/dL Final      Magnesium No results found for: \"MG\"   Uric Acid No components found for: \"URIC ACID\"     Imaging Results (Last 72 Hours)       ** No results found for the last 72 hours. **            Results for orders placed during the hospital encounter of 04/17/24    XR Chest 1 View    Narrative  XR CHEST 1 VW    Date of Exam: 4/17/2024 2:04 PM EDT    Indication: AMS Protocol  AMS Protocol    Comparison: None available.    Findings:  No acute airspace disease. Heart size is within normal limits. No pleural effusion or pneumothorax or acute osseous abnormality    Impression  Impression:  No acute chest finding.      Electronically Signed: Vane Silva MD  4/17/2024 2:07 PM EDT  Workstation ID: [id number]            ASSESSMENT / PLAN      Overdose    TYLER-likely ATN related to rhabdomyolysis  Acute rhabdomyolysis-traumatic/fall.  Found on floor.  Aggressive IV hydration.  Trend CK  Drug overdose-Benadryl/NyQuil/DayQuil  Hypokalemia on admission-resolved  Mild metabolic acidosis-resolved     CR better, CK trending down, < 9k now  Trend CK  Can d/c fluids later today  Will sign off/please call for any further questions        Maikol Mclean MD  [facility]  [phone]  04/21/24  08:32 EDT      Electronically signed by Maikol Mclean MD at 04/21/24 1436       "

## 2024-04-21 NOTE — PROGRESS NOTES
"NEPHROLOGY PROGRESS NOTE------[facility]    [facility]  [phone]  Maikol Mclean MD      Patient Care Team:  Keri Shrestha APRN as PCP - General (Nurse Practitioner)  Maikol Mclean MD as Consulting Physician (Nephrology)      Provider:  Maikol Mclean MD  Patient Name: Keagan Valenzuela  :  1985    SUBJECTIVE:  Follow-up TYLER/rhabdomyolysis  No chest pain or shortness of breath    Medication:  cefTRIAXone, 1,000 mg, Intravenous, Q24H  pantoprazole, 40 mg, Intravenous, Q12H  sertraline, 50 mg, Oral, Daily  sodium chloride, 10 mL, Intravenous, Q12H      sodium chloride, 125 mL/hr, Last Rate: 125 mL/hr (24 1510)        OBJECTIVE    Vital Sign Min/Max for last 24 hours  Temp  Min: 97.6 °F (36.4 °C)  Max: 99.2 °F (37.3 °C)   BP  Min: 90/52  Max: 127/83   Pulse  Min: 77  Max: 106   Resp  Min: 15  Max: 19   SpO2  Min: 96 %  Max: 97 %   No data recorded   No data recorded     Flowsheet Rows      Flowsheet Row First Filed Value   Admission Height 177.8 cm (70\") Documented at 2024 1340   Admission Weight 58 kg (127 lb 13.9 oz) Documented at 2024 1340            No intake/output data recorded.  I/O last 3 completed shifts:  In: 1100 [P.O.:1100]  Out: -     Physical Exam:  General Appearance: alert, appears stated age and cooperative  Head: normocephalic, without obvious abnormality and atraumatic  Eyes: conjunctivae and sclerae normal and no icterus  Neck: supple and no JVD  Lungs: clear to auscultation and respirations regular  Heart: regular rhythm & normal rate and normal S1, S2  Chest: Wall no abnormalities observed  Abdomen: normal bowel sounds and soft, nontender  Extremities: moves extremities well, no edema, no cyanosis and no redness  Skin: no bleeding, bruising or rash, turgor normal, color normal and no lesions noted  Neurologic: Alert, and oriented. No focal deficits    Labs:    WBC WBC   Date Value Ref Range " "Status   04/19/2024 8.77 3.40 - 10.80 10*3/mm3 Final   04/18/2024 7.73 3.40 - 10.80 10*3/mm3 Final      HGB Hemoglobin   Date Value Ref Range Status   04/19/2024 14.0 13.0 - 17.7 g/dL Final   04/18/2024 12.8 (L) 13.0 - 17.7 g/dL Final      HCT Hematocrit   Date Value Ref Range Status   04/19/2024 41.4 37.5 - 51.0 % Final   04/18/2024 38.6 37.5 - 51.0 % Final      Platelets No results found for: \"LABPLAT\"   MCV MCV   Date Value Ref Range Status   04/19/2024 88.5 79.0 - 97.0 fL Final   04/18/2024 89.8 79.0 - 97.0 fL Final          Sodium Sodium   Date Value Ref Range Status   04/21/2024 142 136 - 145 mmol/L Final   04/20/2024 144 136 - 145 mmol/L Final   04/19/2024 141 136 - 145 mmol/L Final   04/18/2024 140 136 - 145 mmol/L Final      Potassium Potassium   Date Value Ref Range Status   04/21/2024 3.5 3.5 - 5.2 mmol/L Final   04/20/2024 3.7 3.5 - 5.2 mmol/L Final   04/19/2024 3.8 3.5 - 5.2 mmol/L Final   04/18/2024 3.8 3.5 - 5.2 mmol/L Final      Chloride Chloride   Date Value Ref Range Status   04/21/2024 103 98 - 107 mmol/L Final   04/20/2024 105 98 - 107 mmol/L Final   04/19/2024 103 98 - 107 mmol/L Final   04/18/2024 108 (H) 98 - 107 mmol/L Final      CO2 CO2   Date Value Ref Range Status   04/21/2024 27.0 22.0 - 29.0 mmol/L Final   04/20/2024 29.0 22.0 - 29.0 mmol/L Final   04/19/2024 29.0 22.0 - 29.0 mmol/L Final   04/18/2024 26.0 22.0 - 29.0 mmol/L Final      BUN BUN   Date Value Ref Range Status   04/21/2024 6 6 - 20 mg/dL Final   04/20/2024 4 (L) 6 - 20 mg/dL Final   04/19/2024 3 (L) 6 - 20 mg/dL Final   04/18/2024 9 6 - 20 mg/dL Final      Creatinine Creatinine   Date Value Ref Range Status   04/21/2024 0.80 0.76 - 1.27 mg/dL Final   04/20/2024 0.87 0.76 - 1.27 mg/dL Final   04/19/2024 0.90 0.76 - 1.27 mg/dL Final   04/18/2024 1.14 0.76 - 1.27 mg/dL Final      Calcium Calcium   Date Value Ref Range Status   04/21/2024 9.3 8.6 - 10.5 mg/dL Final   04/20/2024 8.9 8.6 - 10.5 mg/dL Final   04/19/2024 8.8 8.6 - " "10.5 mg/dL Final   04/18/2024 8.5 (L) 8.6 - 10.5 mg/dL Final      PO4 No components found for: \"PO4\"   Albumin Albumin   Date Value Ref Range Status   04/21/2024 3.7 3.5 - 5.2 g/dL Final   04/20/2024 3.6 3.5 - 5.2 g/dL Final   04/19/2024 3.6 3.5 - 5.2 g/dL Final   04/18/2024 3.5 3.5 - 5.2 g/dL Final      Magnesium No results found for: \"MG\"   Uric Acid No components found for: \"URIC ACID\"     Imaging Results (Last 72 Hours)       ** No results found for the last 72 hours. **            Results for orders placed during the hospital encounter of 04/17/24    XR Chest 1 View    Narrative  XR CHEST 1 VW    Date of Exam: 4/17/2024 2:04 PM EDT    Indication: AMS Protocol  AMS Protocol    Comparison: None available.    Findings:  No acute airspace disease. Heart size is within normal limits. No pleural effusion or pneumothorax or acute osseous abnormality    Impression  Impression:  No acute chest finding.      Electronically Signed: Vane Silva MD  4/17/2024 2:07 PM EDT  Workstation ID: [id number]            ASSESSMENT / PLAN      Overdose    TYLER-likely ATN related to rhabdomyolysis  Acute rhabdomyolysis-traumatic/fall.  Found on floor.  Aggressive IV hydration.  Trend CK  Drug overdose-Benadryl/NyQuil/DayQuil  Hypokalemia on admission-resolved  Mild metabolic acidosis-resolved     CR better, CK trending down, < 9k now  Trend CK  Can d/c fluids later today  Will sign off/please call for any further questions        Maikol Mclean MD  [facility]  [phone]  04/21/24  08:32 EDT    "

## 2024-04-21 NOTE — PROGRESS NOTES
[facility] MEDICINE SERVICE  DAILY PROGRESS NOTE    NAME: Keagan Valenzuela  : 1985  MRN: [id number]      LOS: 4 days     PROVIDER OF SERVICE: Gerardo Toledo MD    Chief Complaint: Overdose    History of Present Illness:   This is a 37 years old male with no significant past medical history who was brought in after he was found unconscious at home.  Per family he has been acting strange and isolating himself, looks depressed and was recently seen in the ED, he was discharged home and instructed to stay away from Benadryl and NyQuil.  Given his family did not hear from him today EMS was called, his stool was broken and patient was followed unresponsive on the floor with empty bottles of Benadryl, NyQuil, DayQuil and he was then brought to the ED for further workup and management     Vital signs on presentation showed a blood pressure of 135/95, afebrile, respiratory rate 16 saturating 97% on room air with a heart rate of 100.      Labs showed creatinine kinase 13,383, sodium 140, potassium 3.4, bicarb 25, anion gap 16, WBC 13.3 otherwise CBC within normal limit, urinalysis was positive for WBC and protein, urine culture was negative.     Chest x-ray showed no acute chest finding  CT abdomen and pelvis showed generalized air of fluid distention of the bowel, greatest in the ascending colon and transverse colon. Findings favor the presence of diffuse ileus.  There is some layering high density material within the lumen of the stomach, which could represent ingested material or perhaps could represent a small amount of blood products, given the provided history of hematemesis. Tiny dots of air are seen along the periphery of the ascending colon, favored represent air within the lumen of the bowel rather than that of pneumatosis.     Subjective:     Interval History:    .  Patient remained stable overnight.  Today's lab work is pending.  Discussed with father.  Patient feels depressed.   Was admitted with suicide attempt.  Psychiatry is following    4/20.  Patient remained stable overnight,, very withdrawn.  CK has improved to 14 K and creatinine is normal.  Encouraged him to drink plenty of fluid    4/22.  Patient remained stable overnight.  Discussed with the staff and discontinued IV fluids    Review of Systems:   Negative except what is listed above     Objective:     Vital Signs  Temp:  [97.6 °F (36.4 °C)-99.2 °F (37.3 °C)] 97.6 °F (36.4 °C)  Heart Rate:  [] 77  Resp:  [15-19] 16  BP: ()/(52-83) 90/52   Body mass index is 18.35 kg/m².    Physical Exam    General Appearance:    Alert, cooperative, in no acute distress   Head:    Normocephalic, without obvious abnormality, atraumatic   Eyes:            conjunctivae and sclerae normal, no   icterus, no pallor, corneas  clear, PERRLA   Neck:   No adenopathy, supple, trachea midline, no thyromegaly, no   carotid bruit, no JVD   Lungs:     Clear to auscultation,respirations regular, even and                  unlabored    Heart:    Regular rhythm and normal rate, normal S1 and S2, no            murmur, no gallop, no rub, no click   Abdomen:     Normal bowel sounds, no masses, no organomegaly, soft        non-tender, non-distended, no guarding, no rebound                No tenderness   Extremities:   Moves all extremities well, no edema, no cyanosis, no             redness   Lymph nodes:   No palpable adenopathy   Neurologic:   Cranial nerves 2 - 12 grossly intact, sensation intact, DTR       present and equal bilaterally       Scheduled Meds   cefTRIAXone, 1,000 mg, Intravenous, Q24H  pantoprazole, 40 mg, Intravenous, Q12H  sertraline, 50 mg, Oral, Daily  sodium chloride, 10 mL, Intravenous, Q12H       PRN Meds     senna-docusate sodium **AND** polyethylene glycol **AND** bisacodyl **AND** bisacodyl    dextrose    dextrose    glucagon (human recombinant)    LORazepam    LORazepam    LORazepam    ondansetron ODT **OR** ondansetron    sodium  chloride    sodium chloride    sodium chloride   Infusions           Diagnostic Data    Results from last 7 days   Lab Units 04/21/24  0436 04/20/24  0700 04/19/24  1233   WBC 10*3/mm3  --   --  8.77   HEMOGLOBIN g/dL  --   --  14.0   HEMATOCRIT %  --   --  41.4   PLATELETS 10*3/mm3  --   --  188   GLUCOSE mg/dL 85   < > 103*   CREATININE mg/dL 0.80   < > 0.90   BUN mg/dL 6   < > 3*   SODIUM mmol/L 142   < > 141   POTASSIUM mmol/L 3.5   < > 3.8   AST (SGOT) U/L  --   --  272*   ALT (SGPT) U/L  --   --  76*   ALK PHOS U/L  --   --  65   BILIRUBIN mg/dL  --   --  0.3   ANION GAP mmol/L 12.0   < > 9.0    < > = values in this interval not displayed.       No radiology results for the last day      I have personally reviewed the patient's new results.     Assessment/Plan:     Active and Resolved Problems    Suicide attempt  Rhabdomyolysis  History of alcohol abuse  TYLER  Metabolic acidosis resolved    Suggestion:    4/21.  At this time IV fluid has been discontinued.  Continue supportive care.  Discharge to inpatient psych unit once everything is arranged    4/20.  Patient is medically stable.  Patient can be transferred to psych unit for suicide attempt and ideations.    4/19.  Patient is medically stable.  Psychiatry is following.  Patient will need to go to inpatient psych for suicide attempt.  Continue IV hydration and nephrology is following.  Will discharge patient to psych unit once everything is arranged    DVT prophylaxis:  No DVT prophylaxis order currently exists.         Code status is   Code Status and Medical Interventions:   Ordered at: 04/17/24 1720     Code Status (Patient has no pulse and is not breathing):    CPR (Attempt to Resuscitate)     Medical Interventions (Patient has pulse or is breathing):    Full Support       Plan for disposition: 4/22    Time: 30 minutes    Signature: Electronically signed by Gerardo Toledo MD, 04/21/24, 10:49 EDT.  [facility] Hospitalist Team

## 2024-04-21 NOTE — PROGRESS NOTES
Chief complaint Depression, SI    Subjective .     History of present illness:   The patient is a 38 y.o. male who was admitted secondary to overdose.  Patient was found on the floor unconscious.  Past medical history includes depression.  Patient reports he intentionally overdosed on Benadryl and NyQuil and rubbing alcohol with the intent to harm himself and die.  No other previous attempts   Denies specific plan      Patient has a long history of depression hospitalized in 2018 and seen by [facility].  Patient reports that he started an antidepressant at that time cannot recall which one.  Not currently taking any medications at this time.     Patient reports misusing cold and cough medicine to get high.  Evasive with details or length of time.  History of hallucinations related to cough and cold medicine misuse.  Denies other substance use including alcohol     Family at bedside report patient lives alone.  Irritable.  Limited support  Does not work, mostly isolated.  Has been struggling with depression for some time.     Patient endorses SI, denies plan  Denies anxiety  Depression associated with low mood, low energy, feels helpless helpless.  Denies AVH/HI  Not aggressive or acutely agitated at this time    Today   Patient is often selectively mute, appears to be bored, not interested in interview.  Patient's family at bedside again today, reports patient has made several suicidal comments to them.  Patient's family report fearing for his safety if he were to discharge from hospital.  Interview somewhat limited  Per nursing staff no events overnight  Not acutely agitated or aggressive  Patient reports eating and drinking  No issues with sleep  Denies anxiety symptoms    The following portions of the patient's history were reviewed and updated as appropriate: allergies, current medications, past family history, past medical history, past social history, past surgical history and problem  "list.    History    Objective     Vital Signs   /67 (BP Location: Left arm, Patient Position: Lying)   Pulse 87   Temp 97.6 °F (36.4 °C) (Oral)   Resp 16   Ht 177.8 cm (70\")   Wt 58 kg (127 lb 13.9 oz)   SpO2 95%   BMI 18.35 kg/m²       MENTAL STATUS EXAM   General Appearance:  Cleanly groomed and dressed  Attitude:  Evasive  Motor Activity:  Normal gait, posture  Speech:  Minimal spontaneity and selectively mute  Mood and affect:  Irritable and depressed  Thought Process:  Goal-directed and linear  Associations/ Thought Content:  No delusions  Hallucinations:  None  Suicidal Ideations:  Other  Other Comment:  Endorses SI with vague plan, no access  Homicidal Ideation:  Not present  Sensorium:  Alert and clear  Orientation:  Person, place, time and situation  Attention Span/ Concentration:  Selective attention  Intellectual Functioning:  Average range  Insight:  Limited  Judgement:  Limited  Reliability:  Fair  Impulse Control:  Fair         Assessment & Plan       Overdose       Assessment:  Depression with SI, intentional overdose      1. Altered mental status, unspecified altered mental status type    2. Non-traumatic rhabdomyolysis    3. Hematemesis, unspecified whether nausea present    4. Anticholinergic drug overdose, undetermined intent, initial encounter      Treatment Plan:  Pt presents after intentional OD. Pt suffers from severe depression. Not currently on medications.  Patient would likely benefit from SSRI for depression and appropriate at this time for inpatient psychiatric treatment.  Agreeable to inpatient psychiatric treatment  Agreeable to medication management  Endorsing SI, with vague plan of jumping off bridge.   Family has safety concerns if patient were to leave hospital, agreeable to be here at this time, can initiate 24-hour to 48-hour medical hold if patient tries to leave before medically clear.  Continue SI precautions and sitter  Continue sertraline 50 mg daily for " depression  Will follow  Treatment Plan discussed with: Patient and Family    I discussed the patients findings and my recommendations with patient, family, and nursing staff    I have reviewed and approved the behavioral health treatment plans and problem list. Yes  Thank you for the consult   Referring MD has access to consult report and progress notes in EMR     Umm Astorga DNP, APRN  04/21/24  14:54 EDT

## 2024-04-22 VITALS
DIASTOLIC BLOOD PRESSURE: 67 MMHG | TEMPERATURE: 98.1 F | OXYGEN SATURATION: 98 % | HEART RATE: 76 BPM | RESPIRATION RATE: 20 BRPM | BODY MASS INDEX: 18.31 KG/M2 | SYSTOLIC BLOOD PRESSURE: 106 MMHG | HEIGHT: 70 IN | WEIGHT: 127.87 LBS

## 2024-04-22 LAB
ALBUMIN SERPL-MCNC: 4 G/DL (ref 3.5–5.2)
ANION GAP SERPL CALCULATED.3IONS-SCNC: 11 MMOL/L (ref 5–15)
BACTERIA SPEC AEROBE CULT: NORMAL
BACTERIA SPEC AEROBE CULT: NORMAL
BUN SERPL-MCNC: 10 MG/DL (ref 6–20)
BUN/CREAT SERPL: 11.9 (ref 7–25)
CALCIUM SPEC-SCNC: 9.7 MG/DL (ref 8.6–10.5)
CHLORIDE SERPL-SCNC: 100 MMOL/L (ref 98–107)
CK BB CFR SERPL ELPH: 0 %
CK MACRO1 CFR SERPL: 0 %
CK MACRO2 CFR SERPL: 0 %
CK MB CFR SERPL ELPH: 0 % (ref 0–3)
CK MM CFR SERPL ELPH: 100 % (ref 97–100)
CK SERPL-CCNC: 3925 U/L (ref 20–200)
CK SERPL-CCNC: ABNORMAL U/L (ref 49–439)
CO2 SERPL-SCNC: 29 MMOL/L (ref 22–29)
CREAT SERPL-MCNC: 0.84 MG/DL (ref 0.76–1.27)
EGFRCR SERPLBLD CKD-EPI 2021: 113.8 ML/MIN/1.73
GLUCOSE SERPL-MCNC: 92 MG/DL (ref 65–99)
PHOSPHATE SERPL-MCNC: 3.3 MG/DL (ref 2.5–4.5)
POTASSIUM SERPL-SCNC: 3.7 MMOL/L (ref 3.5–5.2)
SARS-COV-2 RNA RESP QL NAA+PROBE: NOT DETECTED
SODIUM SERPL-SCNC: 140 MMOL/L (ref 136–145)

## 2024-04-22 PROCEDURE — 87635 SARS-COV-2 COVID-19 AMP PRB: CPT | Performed by: INTERNAL MEDICINE

## 2024-04-22 PROCEDURE — 97110 THERAPEUTIC EXERCISES: CPT

## 2024-04-22 PROCEDURE — 82550 ASSAY OF CK (CPK): CPT | Performed by: INTERNAL MEDICINE

## 2024-04-22 PROCEDURE — 97116 GAIT TRAINING THERAPY: CPT

## 2024-04-22 PROCEDURE — 80069 RENAL FUNCTION PANEL: CPT | Performed by: INTERNAL MEDICINE

## 2024-04-22 RX ADMIN — Medication 10 ML: at 09:06

## 2024-04-22 RX ADMIN — PANTOPRAZOLE SODIUM 40 MG: 40 INJECTION, POWDER, FOR SOLUTION INTRAVENOUS at 04:14

## 2024-04-22 RX ADMIN — SERTRALINE HYDROCHLORIDE 50 MG: 50 TABLET ORAL at 09:06

## 2024-04-22 NOTE — PAYOR COMM NOTE
"Keagan Rubin (38 y.o. Male)       Date of Birth   1985    Social Security Number       Address   [address] [place] [address] [place] [facility]    Home Phone   [phone]    MRN   [id number]       Jainism   None    Marital Status   Single                            Admission Date   4/17/24    Admission Type   Emergency    Admitting Provider   Miguel Angel Eugene MD    Attending Provider   Gerardo Toledo MD    Department, Room/Bed   [facility] 3C MEDICAL INPATIENT, 364/1       Discharge Date       Discharge Disposition   Psychiatric Hospital or Unit (DC - External or [facility])    Discharge Destination                                 Attending Provider: Gerardo Toledo MD    Allergies: No Known Allergies    Isolation: None   Infection: None   Code Status: CPR    Ht: 177.8 cm (70\")   Wt: 58 kg (127 lb 13.9 oz)    Admission Cmt: None   Principal Problem: Overdose [T50.901A]                   Active Insurance as of 4/17/2024       Primary Coverage       Payor Plan Insurance Group Employer/Plan Group    [email or web address] BLUE [employer] PPO [id number]       Payor Plan Address Payor Plan Phone Number Payor Plan Fax Number Effective Dates    [address] [phone]  1/1/2022 - None Entered    [facility] [id number]         Subscriber Name Subscriber Birth Date Member ID       KEAGAN RUBIN 1985 [id number]                     Emergency Contacts        (Rel.) Home Phone Work Phone Mobile Phone    PILLO RUBIN (Father) -- -- [phone]    shad rubin (Mother) -- -- [phone]    margarito rubin -- -- [phone]              Operative/Procedure Notes (last 48 hours)  Notes from 04/20/24 1303 through 04/22/24 1303   No notes of this type exist for this encounter.          Physician Progress Notes (last 48 hours)        Umm Astorga, DNP, APRN at 04/21/24 1454              Chief complaint Depression, SI    Subjective . "     History of present illness:   The patient is a 38 y.o. male who was admitted secondary to overdose.  Patient was found on the floor unconscious.  Past medical history includes depression.  Patient reports he intentionally overdosed on Benadryl and NyQuil and rubbing alcohol with the intent to harm himself and die.  No other previous attempts   Denies specific plan      Patient has a long history of depression hospitalized in 2018 and seen by [facility].  Patient reports that he started an antidepressant at that time cannot recall which one.  Not currently taking any medications at this time.     Patient reports misusing cold and cough medicine to get high.  Evasive with details or length of time.  History of hallucinations related to cough and cold medicine misuse.  Denies other substance use including alcohol     Family at bedside report patient lives alone.  Irritable.  Limited support  Does not work, mostly isolated.  Has been struggling with depression for some time.     Patient endorses SI, denies plan  Denies anxiety  Depression associated with low mood, low energy, feels helpless helpless.  Denies AVH/HI  Not aggressive or acutely agitated at this time    Today   Patient is often selectively mute, appears to be bored, not interested in interview.  Patient's family at bedside again today, reports patient has made several suicidal comments to them.  Patient's family report fearing for his safety if he were to discharge from hospital.  Interview somewhat limited  Per nursing staff no events overnight  Not acutely agitated or aggressive  Patient reports eating and drinking  No issues with sleep  Denies anxiety symptoms    The following portions of the patient's history were reviewed and updated as appropriate: allergies, current medications, past family history, past medical history, past social history, past surgical history and problem list.    History    Objective     Vital Signs   BP  "106/67 (BP Location: Left arm, Patient Position: Lying)   Pulse 87   Temp 97.6 °F (36.4 °C) (Oral)   Resp 16   Ht 177.8 cm (70\")   Wt 58 kg (127 lb 13.9 oz)   SpO2 95%   BMI 18.35 kg/m²       MENTAL STATUS EXAM   General Appearance:  Cleanly groomed and dressed  Attitude:  Evasive  Motor Activity:  Normal gait, posture  Speech:  Minimal spontaneity and selectively mute  Mood and affect:  Irritable and depressed  Thought Process:  Goal-directed and linear  Associations/ Thought Content:  No delusions  Hallucinations:  None  Suicidal Ideations:  Other  Other Comment:  Endorses SI with vague plan, no access  Homicidal Ideation:  Not present  Sensorium:  Alert and clear  Orientation:  Person, place, time and situation  Attention Span/ Concentration:  Selective attention  Intellectual Functioning:  Average range  Insight:  Limited  Judgement:  Limited  Reliability:  Fair  Impulse Control:  Fair         Assessment & Plan       Overdose       Assessment:  Depression with SI, intentional overdose      1. Altered mental status, unspecified altered mental status type    2. Non-traumatic rhabdomyolysis    3. Hematemesis, unspecified whether nausea present    4. Anticholinergic drug overdose, undetermined intent, initial encounter      Treatment Plan:  Pt presents after intentional OD. Pt suffers from severe depression. Not currently on medications.  Patient would likely benefit from SSRI for depression and appropriate at this time for inpatient psychiatric treatment.  Agreeable to inpatient psychiatric treatment  Agreeable to medication management  Endorsing SI, with vague plan of jumping off bridge.   Family has safety concerns if patient were to leave hospital, agreeable to be here at this time, can initiate 24-hour to 48-hour medical hold if patient tries to leave before medically clear.  Continue SI precautions and sitter  Continue sertraline 50 mg daily for depression  Will follow  Treatment Plan discussed with: " Patient and Family    I discussed the patients findings and my recommendations with patient, family, and nursing staff    I have reviewed and approved the behavioral health treatment plans and problem list. Yes  Thank you for the consult   Referring MD has access to consult report and progress notes in EMR     Umm Astorga DNP, APRN  24  14:54 EDT             Electronically signed by Umm Astorga DNP, APRN at 24 1075       Gerardo Toledo MD at 24 5603              [facility] MEDICINE SERVICE  DAILY PROGRESS NOTE    NAME: Keagan Valenzuela  : 1985  MRN: [id number]      LOS: 4 days     PROVIDER OF SERVICE: Gerardo Toledo MD    Chief Complaint: Overdose    History of Present Illness:   This is a 37 years old male with no significant past medical history who was brought in after he was found unconscious at home.  Per family he has been acting strange and isolating himself, looks depressed and was recently seen in the ED, he was discharged home and instructed to stay away from Benadryl and NyQuil.  Given his family did not hear from him today EMS was called, his stool was broken and patient was followed unresponsive on the floor with empty bottles of Benadryl, NyQuil, DayQuil and he was then brought to the ED for further workup and management     Vital signs on presentation showed a blood pressure of 135/95, afebrile, respiratory rate 16 saturating 97% on room air with a heart rate of 100.      Labs showed creatinine kinase 13,383, sodium 140, potassium 3.4, bicarb 25, anion gap 16, WBC 13.3 otherwise CBC within normal limit, urinalysis was positive for WBC and protein, urine culture was negative.     Chest x-ray showed no acute chest finding  CT abdomen and pelvis showed generalized air of fluid distention of the bowel, greatest in the ascending colon and transverse colon. Findings favor the presence of diffuse ileus.  There is some layering high density material  within the lumen of the stomach, which could represent ingested material or perhaps could represent a small amount of blood products, given the provided history of hematemesis. Tiny dots of air are seen along the periphery of the ascending colon, favored represent air within the lumen of the bowel rather than that of pneumatosis.     Subjective:     Interval History:    4/19.  Patient remained stable overnight.  Today's lab work is pending.  Discussed with father.  Patient feels depressed.  Was admitted with suicide attempt.  Psychiatry is following    4/20.  Patient remained stable overnight,, very withdrawn.  CK has improved to 14 K and creatinine is normal.  Encouraged him to drink plenty of fluid    4/22.  Patient remained stable overnight.  Discussed with the staff and discontinued IV fluids    Review of Systems:   Negative except what is listed above     Objective:     Vital Signs  Temp:  [97.6 °F (36.4 °C)-99.2 °F (37.3 °C)] 97.6 °F (36.4 °C)  Heart Rate:  [] 77  Resp:  [15-19] 16  BP: ()/(52-83) 90/52   Body mass index is 18.35 kg/m².    Physical Exam    General Appearance:    Alert, cooperative, in no acute distress   Head:    Normocephalic, without obvious abnormality, atraumatic   Eyes:            conjunctivae and sclerae normal, no   icterus, no pallor, corneas  clear, PERRLA   Neck:   No adenopathy, supple, trachea midline, no thyromegaly, no   carotid bruit, no JVD   Lungs:     Clear to auscultation,respirations regular, even and                  unlabored    Heart:    Regular rhythm and normal rate, normal S1 and S2, no            murmur, no gallop, no rub, no click   Abdomen:     Normal bowel sounds, no masses, no organomegaly, soft        non-tender, non-distended, no guarding, no rebound                No tenderness   Extremities:   Moves all extremities well, no edema, no cyanosis, no             redness   Lymph nodes:   No palpable adenopathy   Neurologic:   Cranial nerves 2 - 12  grossly intact, sensation intact, DTR       present and equal bilaterally       Scheduled Meds   cefTRIAXone, 1,000 mg, Intravenous, Q24H  pantoprazole, 40 mg, Intravenous, Q12H  sertraline, 50 mg, Oral, Daily  sodium chloride, 10 mL, Intravenous, Q12H       PRN Meds     senna-docusate sodium **AND** polyethylene glycol **AND** bisacodyl **AND** bisacodyl    dextrose    dextrose    glucagon (human recombinant)    LORazepam    LORazepam    LORazepam    ondansetron ODT **OR** ondansetron    sodium chloride    sodium chloride    sodium chloride   Infusions           Diagnostic Data    Results from last 7 days   Lab Units 04/21/24  0436 04/20/24  0700 04/19/24  1233   WBC 10*3/mm3  --   --  8.77   HEMOGLOBIN g/dL  --   --  14.0   HEMATOCRIT %  --   --  41.4   PLATELETS 10*3/mm3  --   --  188   GLUCOSE mg/dL 85   < > 103*   CREATININE mg/dL 0.80   < > 0.90   BUN mg/dL 6   < > 3*   SODIUM mmol/L 142   < > 141   POTASSIUM mmol/L 3.5   < > 3.8   AST (SGOT) U/L  --   --  272*   ALT (SGPT) U/L  --   --  76*   ALK PHOS U/L  --   --  65   BILIRUBIN mg/dL  --   --  0.3   ANION GAP mmol/L 12.0   < > 9.0    < > = values in this interval not displayed.       No radiology results for the last day      I have personally reviewed the patient's new results.     Assessment/Plan:     Active and Resolved Problems    Suicide attempt  Rhabdomyolysis  History of alcohol abuse  TYLER  Metabolic acidosis resolved    Suggestion:    4/21.  At this time IV fluid has been discontinued.  Continue supportive care.  Discharge to inpatient psych unit once everything is arranged    4/20.  Patient is medically stable.  Patient can be transferred to psych unit for suicide attempt and ideations.    4/19.  Patient is medically stable.  Psychiatry is following.  Patient will need to go to inpatient psych for suicide attempt.  Continue IV hydration and nephrology is following.  Will discharge patient to psych unit once everything is arranged    DVT  "prophylaxis:  No DVT prophylaxis order currently exists.         Code status is   Code Status and Medical Interventions:   Ordered at: 24 1720     Code Status (Patient has no pulse and is not breathing):    CPR (Attempt to Resuscitate)     Medical Interventions (Patient has pulse or is breathing):    Full Support       Plan for disposition:     Time: 30 minutes    Signature: Electronically signed by Gerardo Toledo MD, 24, 10:49 EDT.  [facility] Hospitalist Team    Electronically signed by Gerardo Toledo MD at 24 1050       Maikol Mclean MD at 24 0832          NEPHROLOGY PROGRESS NOTE------[facility]    [facility]  [phone]  Maikol Mclean MD      Patient Care Team:  Keri Shrestha APRN as PCP - General (Nurse Practitioner)  Maikol Mclean MD as Consulting Physician (Nephrology)      Provider:  Maikol Mclean MD  Patient Name: Keagan Valenzuela  :  1985    SUBJECTIVE:  Follow-up TYLER/rhabdomyolysis  No chest pain or shortness of breath    Medication:  cefTRIAXone, 1,000 mg, Intravenous, Q24H  pantoprazole, 40 mg, Intravenous, Q12H  sertraline, 50 mg, Oral, Daily  sodium chloride, 10 mL, Intravenous, Q12H      sodium chloride, 125 mL/hr, Last Rate: 125 mL/hr (24 1510)        OBJECTIVE    Vital Sign Min/Max for last 24 hours  Temp  Min: 97.6 °F (36.4 °C)  Max: 99.2 °F (37.3 °C)   BP  Min: 90/52  Max: 127/83   Pulse  Min: 77  Max: 106   Resp  Min: 15  Max: 19   SpO2  Min: 96 %  Max: 97 %   No data recorded   No data recorded     Flowsheet Rows      Flowsheet Row First Filed Value   Admission Height 177.8 cm (70\") Documented at 2024 1340   Admission Weight 58 kg (127 lb 13.9 oz) Documented at 2024 1340            No intake/output data recorded.  I/O last 3 completed shifts:  In: 1100 [P.O.:1100]  Out: -     Physical Exam:  General Appearance: alert, appears stated age and " "cooperative  Head: normocephalic, without obvious abnormality and atraumatic  Eyes: conjunctivae and sclerae normal and no icterus  Neck: supple and no JVD  Lungs: clear to auscultation and respirations regular  Heart: regular rhythm & normal rate and normal S1, S2  Chest: Wall no abnormalities observed  Abdomen: normal bowel sounds and soft, nontender  Extremities: moves extremities well, no edema, no cyanosis and no redness  Skin: no bleeding, bruising or rash, turgor normal, color normal and no lesions noted  Neurologic: Alert, and oriented. No focal deficits    Labs:    WBC WBC   Date Value Ref Range Status   04/19/2024 8.77 3.40 - 10.80 10*3/mm3 Final   04/18/2024 7.73 3.40 - 10.80 10*3/mm3 Final      HGB Hemoglobin   Date Value Ref Range Status   04/19/2024 14.0 13.0 - 17.7 g/dL Final   04/18/2024 12.8 (L) 13.0 - 17.7 g/dL Final      HCT Hematocrit   Date Value Ref Range Status   04/19/2024 41.4 37.5 - 51.0 % Final   04/18/2024 38.6 37.5 - 51.0 % Final      Platelets No results found for: \"LABPLAT\"   MCV MCV   Date Value Ref Range Status   04/19/2024 88.5 79.0 - 97.0 fL Final   04/18/2024 89.8 79.0 - 97.0 fL Final          Sodium Sodium   Date Value Ref Range Status   04/21/2024 142 136 - 145 mmol/L Final   04/20/2024 144 136 - 145 mmol/L Final   04/19/2024 141 136 - 145 mmol/L Final   04/18/2024 140 136 - 145 mmol/L Final      Potassium Potassium   Date Value Ref Range Status   04/21/2024 3.5 3.5 - 5.2 mmol/L Final   04/20/2024 3.7 3.5 - 5.2 mmol/L Final   04/19/2024 3.8 3.5 - 5.2 mmol/L Final   04/18/2024 3.8 3.5 - 5.2 mmol/L Final      Chloride Chloride   Date Value Ref Range Status   04/21/2024 103 98 - 107 mmol/L Final   04/20/2024 105 98 - 107 mmol/L Final   04/19/2024 103 98 - 107 mmol/L Final   04/18/2024 108 (H) 98 - 107 mmol/L Final      CO2 CO2   Date Value Ref Range Status   04/21/2024 27.0 22.0 - 29.0 mmol/L Final   04/20/2024 29.0 22.0 - 29.0 mmol/L Final   04/19/2024 29.0 22.0 - 29.0 mmol/L " "Final   04/18/2024 26.0 22.0 - 29.0 mmol/L Final      BUN BUN   Date Value Ref Range Status   04/21/2024 6 6 - 20 mg/dL Final   04/20/2024 4 (L) 6 - 20 mg/dL Final   04/19/2024 3 (L) 6 - 20 mg/dL Final   04/18/2024 9 6 - 20 mg/dL Final      Creatinine Creatinine   Date Value Ref Range Status   04/21/2024 0.80 0.76 - 1.27 mg/dL Final   04/20/2024 0.87 0.76 - 1.27 mg/dL Final   04/19/2024 0.90 0.76 - 1.27 mg/dL Final   04/18/2024 1.14 0.76 - 1.27 mg/dL Final      Calcium Calcium   Date Value Ref Range Status   04/21/2024 9.3 8.6 - 10.5 mg/dL Final   04/20/2024 8.9 8.6 - 10.5 mg/dL Final   04/19/2024 8.8 8.6 - 10.5 mg/dL Final   04/18/2024 8.5 (L) 8.6 - 10.5 mg/dL Final      PO4 No components found for: \"PO4\"   Albumin Albumin   Date Value Ref Range Status   04/21/2024 3.7 3.5 - 5.2 g/dL Final   04/20/2024 3.6 3.5 - 5.2 g/dL Final   04/19/2024 3.6 3.5 - 5.2 g/dL Final   04/18/2024 3.5 3.5 - 5.2 g/dL Final      Magnesium No results found for: \"MG\"   Uric Acid No components found for: \"URIC ACID\"     Imaging Results (Last 72 Hours)       ** No results found for the last 72 hours. **            Results for orders placed during the hospital encounter of 04/17/24    XR Chest 1 View    Narrative  XR CHEST 1 VW    Date of Exam: 4/17/2024 2:04 PM EDT    Indication: AMS Protocol  AMS Protocol    Comparison: None available.    Findings:  No acute airspace disease. Heart size is within normal limits. No pleural effusion or pneumothorax or acute osseous abnormality    Impression  Impression:  No acute chest finding.      Electronically Signed: Vane Silva MD  4/17/2024 2:07 PM EDT  Workstation ID: [id number]            ASSESSMENT / PLAN      Overdose    TYLER-likely ATN related to rhabdomyolysis  Acute rhabdomyolysis-traumatic/fall.  Found on floor.  Aggressive IV hydration.  Trend CK  Drug overdose-Benadryl/NyQuil/DayQuil  Hypokalemia on admission-resolved  Mild metabolic acidosis-resolved     CR better, CK trending down, < 9k " now  Trend CK  Can d/c fluids later today  Will sign off/please call for any further questions        Maikol Mclean MD  [facility]  [phone]  04/21/24  08:32 EDT      Electronically signed by Maikol Mclean MD at 04/21/24 1436       Consult Notes (last 48 hours)  Notes from 04/20/24 1303 through 04/22/24 1303   No notes of this type exist for this encounter.

## 2024-04-22 NOTE — CASE MANAGEMENT/SOCIAL WORK
Continued Stay Note  DAV Gallardo     Patient Name: Keagan Valenzuela  MRN: [id number]  Today's Date: 4/22/2024    Admit Date: 4/17/2024    Plan: Return home with 24/7 care with family.   Discharge Plan       Row Name 04/22/24 1333       Plan    Plan Return home with 24/7 care with family.    Patient/Family in Agreement with Plan yes    Plan Comments See SW note regarding pt to return home with family. LORENA Radford notified of cancelled trip to [facility].        Megan Naegele, RN     Office Phone: [phone]  Office Cell: [phone]

## 2024-04-22 NOTE — DISCHARGE PLACEMENT REQUEST
"Madhav Crouch, W, MSW    Phone: [phone]  Fax: [phone]  Email: [email or web address]       Keagan Rubin (38 y.o. Male)       Date of Birth   1985    Social Security Number       Address   [address] [place] [address] [place] [facility]    Home Phone   [phone]    MRN   [id number]       Druze   None    Marital Status   Single                            Admission Date   4/17/24    Admission Type   Emergency    Admitting Provider   Miguel Angel Eugene MD    Attending Provider   Gerardo Toledo MD    Department, Room/Bed   [facility] 3C MEDICAL INPATIENT, 364/1       Discharge Date       Discharge Disposition   Psychiatric Hospital or Unit (DC - External or [facility])    Discharge Destination                                 Attending Provider: Gerardo Toledo MD    Allergies: No Known Allergies    Isolation: None   Infection: None   Code Status: CPR    Ht: 177.8 cm (70\")   Wt: 58 kg (127 lb 13.9 oz)    Admission Cmt: None   Principal Problem: Overdose [T50.901A]                   Active Insurance as of 4/17/2024       Primary Coverage       Payor Plan Insurance Group Employer/Plan Group    [employer] BLUE [employer]OSS [employer] BLUE CROSS BLUE [employer]IELD PPO [id number]       Payor Plan Address Payor Plan Phone Number Payor Plan Fax Number Effective Dates    [address] [phone]  1/1/2022 - None Entered    [place] [address]         Subscriber Name Subscriber Birth Date Member ID       KEAGAN RUBIN 1985 [id number]                     Emergency Contacts        (Rel.) Home Phone Work Phone Mobile Phone    PILLO RUBIN (Father) -- -- [phone]    shad rubin (Mother) -- -- [phone]    margarito rubin -- -- [phone]                 Discharge Summary        Gerardo Toledo MD at 04/22/24 36                       [facility] Medicine Services  Discharge Summary    Date of Service: 04/22/24  Patient " Name: Keagan Valenzuela  : 1985  MRN: [id number]    Date of Admission: 2024  Date of Discharge:  24  Primary Care Physician: Keri Shrestha APRN      Presenting Problem:   Overdose [T50.901A]  Non-traumatic rhabdomyolysis [M62.82]  Anticholinergic drug overdose, undetermined intent, initial encounter [T44.3X4A]  Altered mental status, unspecified altered mental status type [R41.82]  Hematemesis, unspecified whether nausea present [K92.0]    Active and Resolved Hospital Problems:  Active Hospital Problems    Diagnosis POA    **Overdose [T50.901A] Yes      Resolved Hospital Problems   No resolved problems to display.         Hospital Course     HPI:This is a 37 years old male with no significant past medical history who was brought in after he was found unconscious at home.  Per family he has been acting strange and isolating himself, looks depressed and was recently seen in the ED, he was discharged home and instructed to stay away from Benadryl and NyQuil.  Given his family did not hear from him today EMS was called, his stool was broken and patient was followed unresponsive on the floor with empty bottles of Benadryl, NyQuil, DayQuil and he was then brought to the ED for further workup and management     Vital signs on presentation showed a blood pressure of 135/95, afebrile, respiratory rate 16 saturating 97% on room air with a heart rate of 100.      Labs showed creatinine kinase 13,383, sodium 140, potassium 3.4, bicarb 25, anion gap 16, WBC 13.3 otherwise CBC within normal limit, urinalysis was positive for WBC and protein, urine culture was negative.     Chest x-ray showed no acute chest finding  CT abdomen and pelvis showed generalized air of fluid distention of the bowel, greatest in the ascending colon and transverse colon. Findings favor the presence of diffuse ileus.  There is some layering high density material within the lumen of the stomach, which could represent ingested  material or perhaps could represent a small amount of blood products, given the provided history of hematemesis. Tiny dots of air are seen along the periphery of the ascending colon, favored represent air within the lumen of the bowel rather than that of pneumatosis.       Hospital Course:    4/19.  Patient remained stable overnight.  Today's lab work is pending.  Discussed with father.  Patient feels depressed.  Was admitted with suicide attempt.  Psychiatry is following     4/20.  Patient remained stable overnight,, very withdrawn.  CK has improved to 14 K and creatinine is normal.  Encouraged him to drink plenty of fluid     4/22.  Patient remained stable overnight.  Discussed with the staff and discontinued IV fluids     Active and Resolved Problems     Suicide attempt  Rhabdomyolysis  History of alcohol abuse  TYLER  Metabolic acidosis resolved     Suggestion:    4/22.  Patient remained stable overnight.  Patient was discharged to psychiatry unit for suicidal ideation once everything is arranged     4/21.  At this time IV fluid has been discontinued.  Continue supportive care.  Discharge to inpatient psych unit once everything is arranged     4/20.  Patient is medically stable.  Patient can be transferred to psych unit for suicide attempt and ideations.     4/19.  Patient is medically stable.  Psychiatry is following.  Patient will need to go to inpatient psych for suicide attempt.  Continue IV hydration and nephrology is following.  Will discharge patient to psych unit once everything is arranged           Day of Discharge     Vital Signs:  Temp:  [97.6 °F (36.4 °C)-98.2 °F (36.8 °C)] 98.2 °F (36.8 °C)  Heart Rate:  [72-87] 83  Resp:  [13-24] 16  BP: ()/(52-89) 111/73    Physical Exam:  General Appearance:    Alert, cooperative, in no acute distress   Head:    Normocephalic, without obvious abnormality, atraumatic   Eyes:            conjunctivae and sclerae normal, no   icterus, no pallor, corneas  clear,  PERRLA   Neck:   No adenopathy, supple, trachea midline, no thyromegaly, no   carotid bruit, no JVD   Lungs:     Clear to auscultation,respirations regular, even and                  unlabored    Heart:    Regular rhythm and normal rate, normal S1 and S2, no            murmur, no gallop, no rub, no click   Abdomen:     Normal bowel sounds, no masses, no organomegaly, soft        non-tender, non-distended, no guarding, no rebound                No tenderness   Extremities:   Moves all extremities well, no edema, no cyanosis, no             redness   Lymph nodes:   No palpable adenopathy   Neurologic:   Cranial nerves 2 - 12 grossly intact, sensation intact, DTR       present and equal bilaterally          Pertinent  and/or Most Recent Results     LAB RESULTS:      Lab 04/19/24  1233 04/18/24  0922 04/17/24  2318 04/17/24  2120 04/17/24  1405 04/15/24  0831   WBC 8.77 7.73 9.60  --  13.31* 7.47   HEMOGLOBIN 14.0 12.8* 12.8*  --  14.7 13.8   HEMOGLOBIN, POC  --   --   --  14.0  --   --    HEMATOCRIT 41.4 38.6 37.5  --  40.9 39.9   HEMATOCRIT POC  --   --   --  41  --   --    PLATELETS 188 145 164  --  213 176   NEUTROS ABS 7.47* 6.07 7.98*  --  11.15* 5.46   IMMATURE GRANS (ABS) 0.02 0.02 0.03  --  0.05 0.01   LYMPHS ABS 0.83 1.13 1.13  --  1.49 1.41   MONOS ABS 0.40 0.46 0.44  --  0.60 0.55   EOS ABS 0.03 0.03 0.01  --  0.01 0.03   MCV 88.5 89.8 87.4  --  84.5 87.3   LACTATE  --   --  1.0 7.1*  --   --          Lab 04/22/24  0418 04/21/24  0436 04/20/24  0700 04/19/24  1233 04/18/24  0922 04/17/24  1405 04/15/24  0831   SODIUM 140 142 144 141 140   < > 135*   POTASSIUM 3.7 3.5 3.7 3.8 3.8   < > 3.3*   CHLORIDE 100 103 105 103 108*   < > 96*   CO2 29.0 27.0 29.0 29.0 26.0   < > 28.0   ANION GAP 11.0 12.0 10.0 9.0 6.0   < > 11.0   BUN 10 6 4* 3* 9   < > 9   CREATININE 0.84 0.80 0.87 0.90 1.14   < > 1.27   EGFR 113.8 115.5 112.6 111.4 83.9   < > 73.7   GLUCOSE 92 85 99 103* 88   < > 115*   CALCIUM 9.7 9.3 8.9 8.8 8.5*    < > 9.0   PHOSPHORUS 3.3 3.3 3.0 2.3*  --   --   --    TSH  --   --   --   --   --   --  1.850    < > = values in this interval not displayed.         Lab 04/22/24  0418 04/21/24  0436 04/20/24  0700 04/19/24  1233 04/18/24  0922 04/17/24  2318 04/17/24  1405 04/15/24  0831   TOTAL PROTEIN  --   --   --  6.3 5.8* 5.7* 7.1 6.8   ALBUMIN 4.0 3.7 3.6 3.6 3.5 3.7 4.7 4.2   GLOBULIN  --   --   --  2.7 2.3 2.0 2.4 2.6   ALT (SGPT)  --   --   --  76* 61* 51* 31 11   AST (SGOT)  --   --   --  272* 325* 273* 122* 20   BILIRUBIN  --   --   --  0.3 0.6 0.6 0.6 0.6   ALK PHOS  --   --   --  65 61 63 79 63                     Lab 04/17/24  2120   FIO2 21     Brief Urine Lab Results  (Last result in the past 365 days)        Color   Clarity   Blood   Leuk Est   Nitrite   Protein   CREAT   Urine HCG        04/17/24 1416 Yellow   Clear   Large (3+)   Negative   Negative   30 mg/dL (1+)                 Microbiology Results (last 10 days)       Procedure Component Value - Date/Time    Blood Culture - Blood, Arm, Right [[id number]]  (Normal) Collected: 04/17/24 1809    Lab Status: Preliminary result Specimen: Blood from Arm, Right Updated: 04/21/24 1831     Blood Culture No growth at 4 days    Blood Culture - Blood, Hand, Right [[id number]]  (Normal) Collected: 04/17/24 1809    Lab Status: Preliminary result Specimen: Blood from Hand, Right Updated: 04/21/24 1831     Blood Culture No growth at 4 days    Chlamydia trachomatis, Neisseria gonorrhoeae, PCR - Urine, Urine, Clean Catch [[id number]]  (Normal) Collected: 04/15/24 0854    Lab Status: Final result Specimen: Urine, Clean Catch Updated: 04/15/24 1030     Chlamydia DNA by PCR Not Detected     Neisseria gonorrhoeae by PCR Not Detected            CT Abdomen Pelvis With Contrast    Result Date: 4/17/2024  Impression: Impression: 1. Generalized air of fluid distention of the bowel, greatest in the ascending colon and transverse colon. Findings favor the presence of diffuse ileus. 2.  There is some layering high density material within the lumen of the stomach, which could represent ingested material or perhaps could represent a small amount of blood products, given the provided history of hematemesis. No focal gastric inflammatory  change or mass lesion is identified. 3. Tiny dots of air are seen along the periphery of the ascending colon, favored represent air within the lumen of the bowel rather than that of pneumatosis. However, to be on the safe side, I would recommend short-term abdominal x-ray/KUB follow-up to ensure stability, improvement or resolution. Electronically Signed: Vane Silva MD  4/17/2024 3:18 PM EDT  Workstation ID: [id number]    CT Head Without Contrast    Result Date: 4/17/2024  Impression: Impression: No acute intracranial pathology. Examination limited due to patient motion. Electronically Signed: Davis Hassan MD  4/17/2024 3:12 PM EDT  Workstation ID: [id number]    XR Chest 1 View    Result Date: 4/17/2024  Impression: Impression: No acute chest finding. Electronically Signed: Vane Silva MD  4/17/2024 2:07 PM EDT  Workstation ID: [id number]                 Labs Pending at Discharge:  Pending Labs       Order Current Status    Blood Culture - Blood, Arm, Right Preliminary result    Blood Culture - Blood, Hand, Right Preliminary result            Procedures Performed           Consults:   Consults       Date and Time Order Name Status Description    4/17/2024  9:32 PM IP Consult to Nephrology Completed     4/17/2024  9:09 PM Inpatient Psychiatrist Consult Completed     4/17/2024  8:33 PM Inpatient Psychiatrist Consult      4/17/2024  3:38 PM Gastroenterology (on-call MD unless specified) Completed               Discharge Details        Discharge Medications      Patient Not Prescribed Medications Upon Discharge         No Known Allergies      Discharge Disposition:   Psychiatric Hospital or Unit (DC - External or [facility])    Diet:  Hospital:  Diet Order   Procedures     Diet: Regular/House; Safe Tray; Fluid Consistency: Thin (IDDSI 0)         Discharge Activity:         CODE STATUS:  Code Status and Medical Interventions:   Ordered at: 04/17/24 1720     Code Status (Patient has no pulse and is not breathing):    CPR (Attempt to Resuscitate)     Medical Interventions (Patient has pulse or is breathing):    Full Support         No future appointments.    Additional Instructions for the Follow-ups that You Need to Schedule       Discharge Follow-up with PCP   As directed       Currently Documented PCP:    Keri Shrestha APRN    PCP Phone Number:    [phone]     Follow Up Details: pcp in 2 wks        Discharge Follow-up with Specialty: psych per there regmendation   As directed      Specialty: psych per there regmendation                Time spent on Discharge including face to face service:  >30 minutes    Signature: Electronically signed by Gerardo Toledo MD, 04/22/24, 07:36 EDT.  [facility] Hospitalist Team    Electronically signed by Gerardo Toledo MD at 04/22/24 3107

## 2024-04-22 NOTE — CASE MANAGEMENT/SOCIAL WORK
"Physicians Statement of Medical Necessity for  Ambulance Transportation    GENERAL INFORMATION     Name: Keagan Valenzuela  YOB: 1985  [employer] [employer]     [id number]     Transport Date: 04/22/24   Origin: [facility]  Destination: [facility] ([address] [place] [address] [place] [address])  Is the Patient's stay covered under Medicare Part A (PPS/DRG?)No   Closest appropriate facility? Yes  If this a hosp-hosp transfer? Yes, describe services needed at 2nd facility not available at 1st facility Psychiatric care d/t SI.  Is this a hospice patient? No    MEDICAL NECESSITY QUESTIONAIRE    Ambulance Transportation is medically necessary only if other means of transportation are contraindicated or would be potentially harmful to the patient.  To meet this requirement, the patient must be either \"bed confined\" or suffer from a condition such that transport by means other than an ambulance is contraindicated by the patient's condition.  The following questions must be answered by the healthcare professional signing below for this form to be valid:     1) Describe the MEDICAL CONDITION (physical and/or mental) of this patient AT THE TIME OF AMBULANCE TRANSPORT that requires the patient to be transported in an ambulance, and why transport by other means is contraindicated by the patient's condition:     Benadryl and NyQuil overdose, depression, suicidal intent.      History reviewed. No pertinent past medical history.   History reviewed. No pertinent surgical history.   2) Is this patient \"bed confined\" as defined below?No    To be \"bed confined\" the patient must satisfy all three of the following criteria:  (1) unable to get up from bed without assistance; AND (2) unable to ambulate;  AND (3) unable to sit in a chair or wheelchair.  3) Can this patient safely be transported by car or wheelchair van (I.e., may safely sit during transport, without an attendant or monitoring?)No   4. In addition " to completing questions 1-3 above, please check any of the following conditions that apply*:          *Note: supporting documentation for any boxes checked must be maintained in the patient's medical records Danger to self/other and Medical attendant required      SIGNATURE OF PHYSICIAN OR OTHER AUTHORIZED HEALTHCARE PROFESSIONAL    I certify that the above information is true and correct based on my evaluation of this patient, and represent that the patient requires transport by ambulance and that other forms of transport are contraindicated.  I understand that this information will be used by the Centers for Medicare and Medicaid Services (CMS) to support the determiniation of medical necessity for ambulance services, and I represent that I have personal knowledge of the patient's condition at the time of transport.       If this box is checked, I also certify that the patient is physically or mentally incapable of signing the ambulance service's claim form and that the institution with which I am affiliated has furnished care, services or assistance to the patient.  My signature below is made on behalf of the patient pursuant to 42 .36(b)(4). In accordance with 42 .37, the specific reason(s) that the patient is physically or mentally incapable of signing the claim for is as follows:     Signature of Physician or Healthcare Professional     MEGAN NAEGELE RN Date/Time:     04/22/24 11:44     (For Scheduled repetitive transport, this form is not valid for transports performed more than 60 days after this date).    MEGAN NAEGELE RN                                                                                                                                         --------------------------------------------------------------------------------------------  Printed Name and Credentials of Physician or Authorized Healthcare Professional     *Form must be signed by patient's attending physician for  scheduled, repetitive transports,.  For non-repetitive ambulance transports, if unable to obtain the signature of the attending physician, any of the following may sign (please select below):     Physician  Clinical Nurse Specialist  X Registered Nurse     Physician Assistant  Discharge Planner  Licensed Practical Nurse     Nurse Practitioner  X

## 2024-04-22 NOTE — CASE MANAGEMENT/SOCIAL WORK
Social Work Assessment   Sami     Patient Name: Keagan Valenzuela  MRN: [id number]  Today's Date: 4/22/2024    Admit Date: 4/17/2024       Discharge Plan       Row Name 04/22/24 1311       Plan    Plan Comments Peggy met with pt and family at bedside for the 4th time. Psych met with family and Pt is no longer suicidal and wants to go home with 24/7 care and mother. Psych [facility] Tello said he is able to go with family. Pt wanted to complete IOP with [facility]. Peggy called [facility] to set up IOP but they are not accepting for IOP. [facility] said Pt would need to come to their facility for an evaluation and then they can set up for IOP. Pt and family declined [facility] and want to take Pt home and work on finding [facility] own therapist/psychiatrist to assist pt with OP behaviroal health.  Peggy let Pt and family know of [facility] open access and asked if they would like a list of other providers in the area. Family/Pt declined and want to work on their own placement. Peggy called [facility] and let know of new plan as well as [facility] and EMS transport. Pt is discharged and family will transport home.          ADEBAYO West, MSW    Phone: [phone]  Fax: [phone]  Email: [email or web address]

## 2024-04-22 NOTE — CASE MANAGEMENT/SOCIAL WORK
Case Management Discharge Note      Final Note: Home w/ family         Selected Continued Care - Discharged on 4/22/2024 Admission date: 4/17/2024 - Discharge disposition: Psychiatric Hospital or Unit (DC - External or [facility])       Transportation Services  Private: Car    Final Discharge Disposition Code: 01 - home or self-care

## 2024-04-22 NOTE — PLAN OF CARE
Pt presents with functional mobility impairments which indicate the need for skilled intervention. Tolerating session today without incident. Pt on room air with sitter and family present.  Pt was independent with bed mobility, supervision for transfer and ambulated 200' with lateral sway with CGA.  Pt unable to look in B directions for obstacle navigation without multiple LOB.  Focused on scanning for obstacles and slowly turning head with turns.  Completed 10 reps BLE strengthening exercises in sitting.  Will continue to follow and progress as tolerated.

## 2024-04-22 NOTE — CASE MANAGEMENT/SOCIAL WORK
Social Work Assessment   Sami     Patient Name: Keagan Valenzuela  MRN: [id number]  Today's Date: 4/22/2024    Admit Date: 4/17/2024         Discharge Plan       Row Name 04/22/24 1215       Plan    Plan [facility] in [place]    Plan Comments Sw sent several referrals for psych IP. [facility] declined [facility]2 medical, [facility] has not accepted, [facility] does not have beds, [facility] still reviewing. [facility]. Pt will be staying on [address], accepting MD: Dr. Osorio. Phone number to call report: [phone] and bed will be assigned when Pt arrives. Sw met with Pt and family. Pt is agreeable to going for IP treatment. Family is not sure they like the accepting facility. Pt bed will be ready after 4 pm.               ADEBAYO West, MSW    Phone: [phone]  Fax: [phone]  Email: [email or web address]

## 2024-04-22 NOTE — PLAN OF CARE
Goal Outcome Evaluation: Patient is discharging home with family.

## 2024-04-22 NOTE — PROGRESS NOTES
Chief complaint depression, overdose attempt    Subjective .     History of present illness:  The patient is a 38 y.o. male who was admitted secondary to overdose.  Patient was found on the floor unconscious.  Past medical history includes depression.  Patient reports he intentionally overdosed on Benadryl and NyQuil and rubbing alcohol with the intent to harm himself and die.  No other previous attempts   Denies specific plan      Patient has a long history of depression hospitalized in 2018 and seen by [facility].  Patient reports that he started an antidepressant at that time cannot recall which one.  Not currently taking any medications at this time.     Patient reports misusing cold and cough medicine to get high.  Evasive with details or length of time.  History of hallucinations related to cough and cold medicine misuse.  Denies other substance use including alcohol     Family at bedside report patient lives alone.  Irritable.  Limited support  Does not work, mostly isolated.  Has been struggling with depression for some time.     Patient endorses SI, denies plan  Denies anxiety  Depression associated with low mood, low energy, feels helpless helpless.  Denies AVH/HI  Not aggressive or acutely agitated at this time.    4/22/24: Patient seen today with his parents present at bedside. The patient states he is feeling some better today. He denies any active suicidal thoughts with any plan or intent. He has been accepted at [facility] but the family is not happy with that location. I discussed possibly intensive outpatient at [facility] with them, but case management reached out to [facility] and they are not accepting for IOP a this time. The patient has had a bad experience at [facility]. His mother is agreeable to taking him home and establishing follow up care for him. I educated her on locking up medications, etc in the house.       Review of Systems   All systems were reviewed and negative  "except for:  Behavioral/Psych: positive for  depression    The following portions of the patient's history were reviewed and updated as appropriate: allergies, current medications, past family history, past medical history, past social history, past surgical history and problem list.    History       No medications prior to admission.        Scheduled Meds:  pantoprazole, 40 mg, Intravenous, Q12H  sertraline, 50 mg, Oral, Daily  sodium chloride, 10 mL, Intravenous, Q12H         Continuous Infusions:       PRN Meds:    senna-docusate sodium **AND** polyethylene glycol **AND** bisacodyl **AND** bisacodyl    dextrose    dextrose    glucagon (human recombinant)    LORazepam    LORazepam    LORazepam    ondansetron ODT **OR** ondansetron    sodium chloride    sodium chloride    sodium chloride      Allergies:  Patient has no known allergies.      Objective     Vital Signs   /67   Pulse 76   Temp 98.1 °F (36.7 °C) (Oral)   Resp 20   Ht 177.8 cm (70\")   Wt 58 kg (127 lb 13.9 oz)   SpO2 98%   BMI 18.35 kg/m²     Physical Exam:    Musculoskeletal:   Muscle strength and tone: equal bilaterally   Abnormal Movements: None noted.   Gait:ABELARDO, patient in bed.      General Appearance:    In bed, in NAD.    Mental Status Exam:   Hygiene:   good  Cooperation:  Cooperative  Eye Contact:  Good  Psychomotor Behavior:  Appropriate  Affect:  Restricted  Mood: depressed  Hopelessness: Denies  Speech:  Normal  Thought Process:  Goal directed and Linear  Thought Content:  Normal  Suicidal:  None  Homicidal:  None  Hallucinations:  None  Delusion:  None  Memory:  Intact  Orientation:  Person, Place, Time, and Situation  Reliability:  fair  Insight:  Fair  Judgement:  Fair  Impulse Control:  Poor  Physical/Medical Issues:  No       Medications and allergies reviewed.    Result Review:  I have personally reviewed the results from the time of this admission to 4/22/2024 13:14 EDT and agree with these findings:  [x]  Laboratory  []  " Microbiology  []  Radiology  [x]  EKG/Telemetry   []  Cardiology/Vascular   []  Pathology  []  Old records  []  Other:      Assessment & Plan       Overdose     Assessment: depression with SI, intentional overdose.   Treatment Plan: Patient presented after an intentional overdose. He reports depression. He was initially agreeable to inpatient psych, however the facility that has a bed open, the family does not want him to go there. We discussed IOP, but the program at [facility] is not accepting patients right now, and the family does not want the patient to go to [facility]. The patient currently denies any suicidal thoughts. His mother has states she is willing to take him home with her and establish follow up care for him. I discussed with her to monitor him 24/7 as well as lock up access to medications.     Continue sertraline 50mg daily at discharge. Patient should establish care with an outpatient psychiatrist at discharge. Counseling would be beneficial as well.     Continue supportive treatment. OK to discharge when medically stable.     Discharge to care of mother.     Will follow PRN.   Treatment Plan discussed with: Patient, Family, and case management.     I discussed the patients findings and my recommendations with patient, family, primary care team, and case management.     I have reviewed and approved the behavioral health treatment plans and problem list. Yes     Referring MD has access to consult report and progress notes in EMR     DHARMESH Norman  04/22/24  13:14 EDT

## 2024-04-22 NOTE — DISCHARGE PLACEMENT REQUEST
"Madhav Crouch, [facility], MSW    Phone: [phone]  Fax: [phone]  Email: [email or web address]         Keagan Rubin (38 y.o. Male)       Date of Birth   1985    Social Security Number       Address   [address] [place] [address] [place] [facility]    Home Phone   [phone]    MRN   [id number]       Oriental orthodox   None    Marital Status   Single                            Admission Date   4/17/24    Admission Type   Emergency    Admitting Provider   Miguel Angel Eugene MD    Attending Provider   Gerardo Toledo MD    Department, Room/Bed   [facility] 3C MEDICAL INPATIENT, 364/1       Discharge Date       Discharge Disposition   Psychiatric Hospital or Unit (DC - External or [facility])    Discharge Destination                                 Attending Provider: Gerardo Toledo MD    Allergies: No Known Allergies    Isolation: None   Infection: None   Code Status: CPR    Ht: 177.8 cm (70\")   Wt: 58 kg (127 lb 13.9 oz)    Admission Cmt: None   Principal Problem: Overdose [T50.901A]                   Active Insurance as of 4/17/2024       Primary Coverage       Payor Plan Insurance Group Employer/Plan Group    [employer]HE[employer] BLUE CROSS AN[employer]M BLUE CROSS BLUE SHIELD PPO [id number]       Payor Plan Address Payor Plan Phone Number Payor Plan Fax Number Effective Dates    [address] [phone]  1/1/2022 - None Entered    [place] [address]         Subscriber Name Subscriber Birth Date Member ID       KEAGAN RUBIN 1985 [id number]                     Emergency Contacts        (Rel.) Home Phone Work Phone Mobile Phone    PILLO RUBIN (Father) -- -- [phone]    shad rubin (Mother) -- -- [phone]    margarito rubin -- -- [phone]                 History & Physical        Anitra Martino MD at 04/17/24 [address] [place] [address] Medicine Services  History & Physical    Patient Name: Keagan Medley " Nicolas  : 1985  MRN: [id number]  Primary Care Physician:  Keri Shrestha APRN  Date of admission: 2024  Date and Time of Service: 2024    Subjective      Chief Complaint: Unresponsive    History of Present Illness:   This is a 37 years old male with no significant past medical history who was brought in after he was found unconscious at home.  Per family he has been acting strange and isolating himself, looks depressed and was recently seen in the ED, he was discharged home and instructed to stay away from Benadryl and NyQuil.  Given his family did not hear from him today EMS was called, his stool was broken and patient was followed unresponsive on the floor with empty bottles of Benadryl, NyQuil, DayQuil and he was then brought to the ED for further workup and management    Vital signs on presentation showed a blood pressure of 135/95, afebrile, respiratory rate 16 saturating 97% on room air with a heart rate of 100.     Labs showed creatinine kinase 13,383, sodium 140, potassium 3.4, bicarb 25, anion gap 16, WBC 13.3 otherwise CBC within normal limit, urinalysis was positive for WBC and protein, urine culture was negative.    Chest x-ray showed no acute chest finding  CT abdomen and pelvis showed generalized air of fluid distention of the bowel, greatest in the ascending colon and transverse colon. Findings favor the presence of diffuse ileus.  There is some layering high density material within the lumen of the stomach, which could represent ingested material or perhaps could represent a small amount of blood products, given the provided history of hematemesis. Tiny dots of air are seen along the periphery of the ascending colon, favored represent air within the lumen of the bowel rather than that of pneumatosis.       eReview of Systems    Personal History     No past medical history on file.    No past surgical history on file.    Family History: family history includes No Known Problems  in his father and mother. Otherwise pertinent FHx was reviewed and not pertinent to current issue.    Social History:  reports that he has been smoking cigarettes. He has never used smokeless tobacco. He reports current alcohol use. Drug use questions deferred to the physician.    Home Medications:  Prior to Admission Medications       None              Allergies:  No Known Allergies    Objective      Vitals:   Temp:  [98.5 °F (36.9 °C)] 98.5 °F (36.9 °C)  Heart Rate:  [100-113] 100  Resp:  [16] 16  BP: (114-155)/(55-95) 135/95  Body mass index is 18.35 kg/m².  Physical Exam    Appearance: No apparent distress, non-toxic appearing   HEENT/Neck: Neck is supple, Extraocular movements intact,   Cardiovascular: Tachycardic with regular rhythm  Pulmonary: Clear to auscultation Bilaterally.   Abdomen: BS+, Soft, non-tender, non-distended.   Ext: No Cyanosis, Clubbing, Edema.   Neuro: Non-focal, Alert & Oriented x 3          Diagnostic Data:  Lab Results (last 24 hours)       Procedure Component Value Units Date/Time    Urine Drug Screen - Urine, Clean Catch [[id number]]  (Normal) Collected: 04/17/24 1416    Specimen: Urine, Clean Catch Updated: 04/17/24 1609     Amphet/Methamphet, Screen Negative     Barbiturates Screen, Urine Negative     Benzodiazepine Screen, Urine Negative     Cocaine Screen, Urine Negative     Opiate Screen Negative     THC, Screen, Urine Negative     Methadone Screen, Urine Negative     Oxycodone Screen, Urine Negative    Narrative:      Negative Thresholds Per Drugs Screened:    Amphetamines                 500 ng/ml  Barbiturates                 200 ng/ml  Benzodiazepines              100 ng/ml  Cocaine                      300 ng/ml  Methadone                    300 ng/ml  Opiates                      300 ng/ml  Oxycodone                    100 ng/ml  THC                           50 ng/ml    The Normal Value for all drugs tested is negative. This report includes final unconfirmed screening  results to be used for medical treatment purposes only. Unconfirmed results must not be used for non-medical purposes such as employment or legal testing. Clinical consideration should be applied to any drug of abuse test, particularly when unconfirmed results are used.          All urine drugs of abuse requests without chain of custody are for medical screening purposes only.  False positives are possible.      Urinalysis, Microscopic Only - Indwelling Urethral Catheter [[id number]]  (Abnormal) Collected: 04/17/24 1416    Specimen: Urine from Indwelling Urethral Catheter Updated: 04/17/24 1520     RBC, UA 0-2 /HPF      WBC, UA 3-5 /HPF      Bacteria, UA None Seen /HPF      Squamous Epithelial Cells, UA 0-2 /HPF      Hyaline Casts, UA 7-12 /LPF      Fine Granular Casts, UA 0-2 /LPF      WBC Clumps, UA Small/1+ /HPF      Methodology Manual Light Microscopy    [place] Draw [[id number]] Collected: 04/17/24 1405    Specimen: Blood Updated: 04/17/24 1515    Narrative:      The following orders were created for panel order [place] Draw.  Procedure                               Abnormality         Status                     ---------                               -----------         ------                     Green Top (Gel)[[id number]]                                  Final result               Lavender Top[[id number]]                                     Final result               Gold Top - SST[[id number]]                                   Final result               Light Blue Top[[id number]]                                   Final result                 Please view results for these tests on the individual orders.    Green Top (Gel) [[id number]] Collected: 04/17/24 1405    Specimen: Blood Updated: 04/17/24 1515     Extra Tube Hold for add-ons.     Comment: Auto resulted.       Lavender Top [[id number]] Collected: 04/17/24 1405    Specimen: Blood Updated: 04/17/24 1515     Extra Tube hold for add-on     Comment: Auto resulted        Light Blue Top [[id number]] Collected: 04/17/24 1405    Specimen: Blood Updated: 04/17/24 1515     Extra Tube Hold for add-ons.     Comment: Auto resulted       CK [[id number]]  (Abnormal) Collected: 04/17/24 1405    Specimen: Blood Updated: 04/17/24 1446     Creatine Kinase 13,383 U/L     Ethanol [[id number]] Collected: 04/17/24 1405    Specimen: Blood Updated: 04/17/24 1444     Ethanol % <0.010 %     Narrative:      Plasma Ethanol Clinical Symptoms:    ETOH (%)               Clinical Symptom  .01-.05              No apparent influence  .03-.12              Euphoria, Diminished judgment and attention   .09-.25              Impaired comprehension, Muscle incoordination  .18-.30              Confusion, Staggered gait, Slurred speech  .25-.40              Markedly decreased response to stimuli, unable to stand or                        walk, vomitting, sleep or stupor  .35-.50              Comatose, Anesthesia, Subnormal body temperature        Gold Top - SST [[id number]] Collected: 04/17/24 1405    Specimen: Blood Updated: 04/17/24 1439     Extra Tube hold    POC Glucose Once [[id number]]  (Normal) Collected: 04/17/24 1435    Specimen: Blood Updated: 04/17/24 1436     Glucose 100 mg/dL      Comment: Serial Number: [id number]:  [id number]       Comprehensive Metabolic Panel [[id number]]  (Abnormal) Collected: 04/17/24 1405    Specimen: Blood Updated: 04/17/24 1434     Glucose 92 mg/dL      BUN 10 mg/dL      Creatinine 1.20 mg/dL      Sodium 140 mmol/L      Potassium 3.4 mmol/L      Comment: Slight hemolysis detected by analyzer. Result may be falsely elevated.        Chloride 99 mmol/L      CO2 25.0 mmol/L      Calcium 10.0 mg/dL      Total Protein 7.1 g/dL      Albumin 4.7 g/dL      ALT (SGPT) 31 U/L      AST (SGOT) 122 U/L      Alkaline Phosphatase 79 U/L      Total Bilirubin 0.6 mg/dL      Globulin 2.4 gm/dL      A/G Ratio 2.0 g/dL      BUN/Creatinine Ratio 8.3     Anion Gap 16.0 mmol/L      eGFR 78.9  mL/min/1.73     Narrative:      GFR Normal >60  Chronic Kidney Disease <60  Kidney Failure <15      Urinalysis With Microscopic If Indicated (No Culture) - Indwelling Urethral Catheter [[id number]]  (Abnormal) Collected: 04/17/24 1416    Specimen: Urine from Indwelling Urethral Catheter Updated: 04/17/24 1431     Color, UA Yellow     Appearance, UA Clear     pH, UA 5.5     Specific Gravity, UA 1.020     Glucose, UA Negative     Ketones, UA Negative     Bilirubin, UA Negative     Blood, UA Large (3+)     Protein, UA 30 mg/dL (1+)     Leuk Esterase, UA Negative     Nitrite, UA Negative     Urobilinogen, UA 1.0 E.U./dL    CBC & Differential [[id number]]  (Abnormal) Collected: 04/17/24 1405    Specimen: Blood Updated: 04/17/24 1412    Narrative:      The following orders were created for panel order CBC & Differential.  Procedure                               Abnormality         Status                     ---------                               -----------         ------                     CBC Auto Differential[[id number]]        Abnormal            Final result                 Please view results for these tests on the individual orders.    CBC Auto Differential [[id number]]  (Abnormal) Collected: 04/17/24 1405    Specimen: Blood Updated: 04/17/24 1412     WBC 13.31 10*3/mm3      RBC 4.84 10*6/mm3      Hemoglobin 14.7 g/dL      Hematocrit 40.9 %      MCV 84.5 fL      MCH 30.4 pg      MCHC 35.9 g/dL      RDW 12.5 %      RDW-SD 38.2 fl      MPV 11.0 fL      Platelets 213 10*3/mm3      Neutrophil % 83.7 %      Lymphocyte % 11.2 %      Monocyte % 4.5 %      Eosinophil % 0.1 %      Basophil % 0.1 %      Immature Grans % 0.4 %      Neutrophils, Absolute 11.15 10*3/mm3      Lymphocytes, Absolute 1.49 10*3/mm3      Monocytes, Absolute 0.60 10*3/mm3      Eosinophils, Absolute 0.01 10*3/mm3      Basophils, Absolute 0.01 10*3/mm3      Immature Grans, Absolute 0.05 10*3/mm3      nRBC 0.0 /100 WBC              Imaging Results (Last 24  Hours)       Procedure Component Value Units Date/Time    CT Abdomen Pelvis With Contrast [[id number]] Collected: 04/17/24 1509     Updated: 04/17/24 1520    Narrative:      CT ABDOMEN PELVIS W CONTRAST    Date of Exam: 4/17/2024 3:02 PM EDT    Indication: vomiting blood. AMS.    Comparison: None available.    Technique: Axial CT images were obtained of the abdomen and pelvis following the uneventful intravenous administration of iodinated contrast. Sagittal and coronal reconstructions were performed.  Automated exposure control and iterative reconstruction   methods were used.        Findings:  Layering high density material is seen within the stomach, which could represent blood products, given the history of hematemesis, or could represent ingested high density material within the gastric lumen. No gastric mass or focal gastric inflammatory   change is appreciated.    There is moderate air-fluid distention of the ascending colon and transverse colon and splenic flexure of the colon. More mild air-fluid distention is seen within the large and small bowel loops. The overall pattern is favored to represent generalized   ileus.    The appendix is filled with air and appears normal (series 4 image 87).    Small dots of air are seen along the walls of the ascending colon, thought to be within the lumen of the bowel rather than representing bowel wall pneumatosis.    No free air or free fluid or adenopathy is seen within the abdomen/pelvis.    Urinary bladder, prostate and rectum are normal.    Liver, gallbladder, spleen, pancreas, adrenals, and kidneys are within normal limits.    The lung bases are clear. The heart size is within normal limits.    No acute or suspicious osseous abnormalities are identified.      Impression:      Impression:    1. Generalized air of fluid distention of the bowel, greatest in the ascending colon and transverse colon. Findings favor the presence of diffuse ileus.  2. There is some  layering high density material within the lumen of the stomach, which could represent ingested material or perhaps could represent a small amount of blood products, given the provided history of hematemesis. No focal gastric inflammatory   change or mass lesion is identified.  3. Tiny dots of air are seen along the periphery of the ascending colon, favored represent air within the lumen of the bowel rather than that of pneumatosis. However, to be on the safe side, I would recommend short-term abdominal x-ray/KUB follow-up to   ensure stability, improvement or resolution.          Electronically Signed: Vane Silva MD    4/17/2024 3:18 PM EDT    Workstation ID: [id number]    CT Head Without Contrast [[id number]] Collected: 04/17/24 1510     Updated: 04/17/24 1514    Narrative:      CT HEAD WO CONTRAST    Date of Exam: 4/17/2024 3:02 PM EDT    Indication: AMS.    Comparison: None available.    Technique: Axial CT images were obtained of the head without contrast administration.  Coronal reconstructions were performed.  Automated exposure control and iterative reconstruction methods were used.    Findings: Exam limited by patient motion throughout the study. Gray-white matter differentiation is maintained without evidence of an acute infarction. No intracranial mass or mass effect. No extra-axial mass or collection. The ventricles and sulci are   normal in size and configuration. The posterior fossa appears normal. Sellar and suprasellar structures are normal.    Orbital and paranasal soft tissues are normal. The paranasal sinuses, ethmoid air cells, and mastoid air cells are aerated. The bony calvarium appears intact. No acute fractures. No lytic or blastic bony diseases. Cerumen within the external acoustic   canals.      Impression:      Impression: No acute intracranial pathology. Examination limited due to patient motion.          Electronically Signed: Davis Hassan MD    4/17/2024 3:12 PM EDT    Workstation ID:  [id number]    XR Chest 1 View [[id number]] Collected: 04/17/24 1407     Updated: 04/17/24 1410    Narrative:      XR CHEST 1 VW    Date of Exam: 4/17/2024 2:04 PM EDT    Indication: AMS Protocol  AMS Protocol    Comparison: None available.    Findings:  No acute airspace disease. Heart size is within normal limits. No pleural effusion or pneumothorax or acute osseous abnormality      Impression:      Impression:  No acute chest finding.      Electronically Signed: Vane Silva MD    4/17/2024 2:07 PM EDT    Workstation ID: [id number]              Assessment & Plan    Drug overdose  -Family found empty bottles of Benadryl, NyQuil, DayQuil while patient was found unconscious.  -He is awake now, still confused and mildly tachycardic.  -? hematemesis on presentation.  -Continue to monitor patient on telemetry, continue aggressive volume resuscitation  -Gastroenterology has been consulted.        Rhabdomyolysis  -Rhabdomyolysis likely from laying on the floor, it is unknown how long he was down.    -Continue volume resuscitation and trend creatinine kinase.  -continue to monitor       Leukocytosis  -Mild leukocytosis, with WBC on urinalysis, could be hemoconcentration however will cover with ceftriaxone for now until patient is more alert to answer questions.      Hypokalemia  -Replete with 40 mEq of potassium chloride x 1          DVT prophylaxis:  Full code  Continue inpatient level care  Medical DVT prophylaxis orders are signed and held.            Signature:     This document has been electronically signed by Anitra Martino MD on April 17, 2024 17:21 EDT   [facility]    Electronically signed by Anitra Martino MD at 04/17/24 1758       Current Facility-Administered Medications   Medication Dose Route Frequency Provider Last Rate Last Admin    sennosides-docusate (PERICOLACE) 8.6-50 MG per tablet 2 tablet  2 tablet Oral BID PRN Anitra Martino MD        And    polyethylene glycol (MIRALAX)  packet 17 g  17 g Oral Daily PRN Anitra Martino MD        And    bisacodyl (DULCOLAX) EC tablet 5 mg  5 mg Oral Daily PRN Anitra Martino MD        And    bisacodyl (DULCOLAX) suppository 10 mg  10 mg Rectal Daily PRN Anitra Martino MD        dextrose (D50W) (25 g/50 mL) IV injection 25 g  25 g Intravenous Q15 Min PRN Miguel Angel Eugene MD   25 g at 04/18/24 0801    dextrose (GLUTOSE) oral gel 15 g  15 g Oral Q15 Min PRN Miguel Angel Eugene MD        glucagon (GLUCAGEN) injection 1 mg  1 mg Subcutaneous Q15 Min PRN Miguel Angel Eugene MD        LORazepam (ATIVAN) injection 1 mg  1 mg Intravenous Q6H PRN Anitra Martino MD   1 mg at 04/17/24 1844    LORazepam (ATIVAN) injection 2 mg  2 mg Intravenous Q15 Min PRN Consuelo Chandler APRN   2 mg at 04/20/24 2354    LORazepam (ATIVAN) injection 4 mg  4 mg Intravenous Q30 Min PRN Consuelo Chandler APRN   4 mg at 04/17/24 2123    ondansetron ODT (ZOFRAN-ODT) disintegrating tablet 4 mg  4 mg Oral Q6H PRN Anitra Martino MD        Or    ondansetron (ZOFRAN) injection 4 mg  4 mg Intravenous Q6H PRN Anitra Martino MD   4 mg at 04/20/24 2054    pantoprazole (PROTONIX) injection 40 mg  40 mg Intravenous Q12H Viral Vega MD   40 mg at 04/22/24 0414    sertraline (ZOLOFT) tablet 50 mg  50 mg Oral Daily Umm Astorga DNP, APRN   50 mg at 04/21/24 0852    sodium chloride 0.9 % flush 10 mL  10 mL Intravenous PRN Aretha Up PA-C        sodium chloride 0.9 % flush 10 mL  10 mL Intravenous Q12H Anitra Martino MD   10 mL at 04/21/24 2102    sodium chloride 0.9 % flush 10 mL  10 mL Intravenous PRN Anitra Martino MD        sodium chloride 0.9 % infusion 40 mL  40 mL Intravenous PRN Anitra Martino MD            Physician Progress Notes (last 48 hours)        Umm Astorga, DNP, APRN at 04/21/24 1450              Chief complaint Depression, SI    Subjective .     History of present illness:   The patient is a 38 y.o. male who was admitted secondary to overdose.   Patient was found on the floor unconscious.  Past medical history includes depression.  Patient reports he intentionally overdosed on Benadryl and NyQuil and rubbing alcohol with the intent to harm himself and die.  No other previous attempts   Denies specific plan      Patient has a long history of depression hospitalized in 2018 and seen by [facility].  Patient reports that he started an antidepressant at that time cannot recall which one.  Not currently taking any medications at this time.     Patient reports misusing cold and cough medicine to get high.  Evasive with details or length of time.  History of hallucinations related to cough and cold medicine misuse.  Denies other substance use including alcohol     Family at bedside report patient lives alone.  Irritable.  Limited support  Does not work, mostly isolated.  Has been struggling with depression for some time.     Patient endorses SI, denies plan  Denies anxiety  Depression associated with low mood, low energy, feels helpless helpless.  Denies AVH/HI  Not aggressive or acutely agitated at this time    Today   Patient is often selectively mute, appears to be bored, not interested in interview.  Patient's family at bedside again today, reports patient has made several suicidal comments to them.  Patient's family report fearing for his safety if he were to discharge from hospital.  Interview somewhat limited  Per nursing staff no events overnight  Not acutely agitated or aggressive  Patient reports eating and drinking  No issues with sleep  Denies anxiety symptoms    The following portions of the patient's history were reviewed and updated as appropriate: allergies, current medications, past family history, past medical history, past social history, past surgical history and problem list.    History    Objective     Vital Signs   /67 (BP Location: Left arm, Patient Position: Lying)   Pulse 87   Temp 97.6 °F (36.4 °C) (Oral)   Resp  "16   Ht 177.8 cm (70\")   Wt 58 kg (127 lb 13.9 oz)   SpO2 95%   BMI 18.35 kg/m²       MENTAL STATUS EXAM   General Appearance:  Cleanly groomed and dressed  Attitude:  Evasive  Motor Activity:  Normal gait, posture  Speech:  Minimal spontaneity and selectively mute  Mood and affect:  Irritable and depressed  Thought Process:  Goal-directed and linear  Associations/ Thought Content:  No delusions  Hallucinations:  None  Suicidal Ideations:  Other  Other Comment:  Endorses SI with vague plan, no access  Homicidal Ideation:  Not present  Sensorium:  Alert and clear  Orientation:  Person, place, time and situation  Attention Span/ Concentration:  Selective attention  Intellectual Functioning:  Average range  Insight:  Limited  Judgement:  Limited  Reliability:  Fair  Impulse Control:  Fair         Assessment & Plan       Overdose       Assessment:  Depression with SI, intentional overdose      1. Altered mental status, unspecified altered mental status type    2. Non-traumatic rhabdomyolysis    3. Hematemesis, unspecified whether nausea present    4. Anticholinergic drug overdose, undetermined intent, initial encounter      Treatment Plan:  Pt presents after intentional OD. Pt suffers from severe depression. Not currently on medications.  Patient would likely benefit from SSRI for depression and appropriate at this time for inpatient psychiatric treatment.  Agreeable to inpatient psychiatric treatment  Agreeable to medication management  Endorsing SI, with vague plan of jumping off bridge.   Family has safety concerns if patient were to leave hospital, agreeable to be here at this time, can initiate 24-hour to 48-hour medical hold if patient tries to leave before medically clear.  Continue SI precautions and sitter  Continue sertraline 50 mg daily for depression  Will follow  Treatment Plan discussed with: Patient and Family    I discussed the patients findings and my recommendations with patient, family, and " nursing staff    I have reviewed and approved the behavioral health treatment plans and problem list. Yes  Thank you for the consult   Referring MD has access to consult report and progress notes in EMR     Umm Astorga DNP, APRN  24  14:54 EDT             Electronically signed by Umm Astorga DNP, APRN at 24 1458       Gerardo Toledo MD at 24 1049              [facility] MEDICINE SERVICE  DAILY PROGRESS NOTE    NAME: Keagan Valenzuela  : 1985  MRN: [id number]      LOS: 4 days     PROVIDER OF SERVICE: Gerardo Toledo MD    Chief Complaint: Overdose    History of Present Illness:   This is a 37 years old male with no significant past medical history who was brought in after he was found unconscious at home.  Per family he has been acting strange and isolating himself, looks depressed and was recently seen in the ED, he was discharged home and instructed to stay away from Benadryl and NyQuil.  Given his family did not hear from him today EMS was called, his stool was broken and patient was followed unresponsive on the floor with empty bottles of Benadryl, NyQuil, DayQuil and he was then brought to the ED for further workup and management     Vital signs on presentation showed a blood pressure of 135/95, afebrile, respiratory rate 16 saturating 97% on room air with a heart rate of 100.      Labs showed creatinine kinase 13,383, sodium 140, potassium 3.4, bicarb 25, anion gap 16, WBC 13.3 otherwise CBC within normal limit, urinalysis was positive for WBC and protein, urine culture was negative.     Chest x-ray showed no acute chest finding  CT abdomen and pelvis showed generalized air of fluid distention of the bowel, greatest in the ascending colon and transverse colon. Findings favor the presence of diffuse ileus.  There is some layering high density material within the lumen of the stomach, which could represent ingested material or perhaps could represent a small  amount of blood products, given the provided history of hematemesis. Tiny dots of air are seen along the periphery of the ascending colon, favored represent air within the lumen of the bowel rather than that of pneumatosis.     Subjective:     Interval History:    4/19.  Patient remained stable overnight.  Today's lab work is pending.  Discussed with father.  Patient feels depressed.  Was admitted with suicide attempt.  Psychiatry is following    4/20.  Patient remained stable overnight,, very withdrawn.  CK has improved to 14 K and creatinine is normal.  Encouraged him to drink plenty of fluid    4/22.  Patient remained stable overnight.  Discussed with the staff and discontinued IV fluids    Review of Systems:   Negative except what is listed above     Objective:     Vital Signs  Temp:  [97.6 °F (36.4 °C)-99.2 °F (37.3 °C)] 97.6 °F (36.4 °C)  Heart Rate:  [] 77  Resp:  [15-19] 16  BP: ()/(52-83) 90/52   Body mass index is 18.35 kg/m².    Physical Exam    General Appearance:    Alert, cooperative, in no acute distress   Head:    Normocephalic, without obvious abnormality, atraumatic   Eyes:            conjunctivae and sclerae normal, no   icterus, no pallor, corneas  clear, PERRLA   Neck:   No adenopathy, supple, trachea midline, no thyromegaly, no   carotid bruit, no JVD   Lungs:     Clear to auscultation,respirations regular, even and                  unlabored    Heart:    Regular rhythm and normal rate, normal S1 and S2, no            murmur, no gallop, no rub, no click   Abdomen:     Normal bowel sounds, no masses, no organomegaly, soft        non-tender, non-distended, no guarding, no rebound                No tenderness   Extremities:   Moves all extremities well, no edema, no cyanosis, no             redness   Lymph nodes:   No palpable adenopathy   Neurologic:   Cranial nerves 2 - 12 grossly intact, sensation intact, DTR       present and equal bilaterally       Scheduled Meds   cefTRIAXone,  1,000 mg, Intravenous, Q24H  pantoprazole, 40 mg, Intravenous, Q12H  sertraline, 50 mg, Oral, Daily  sodium chloride, 10 mL, Intravenous, Q12H       PRN Meds     senna-docusate sodium **AND** polyethylene glycol **AND** bisacodyl **AND** bisacodyl    dextrose    dextrose    glucagon (human recombinant)    LORazepam    LORazepam    LORazepam    ondansetron ODT **OR** ondansetron    sodium chloride    sodium chloride    sodium chloride   Infusions           Diagnostic Data    Results from last 7 days   Lab Units 04/21/24  0436 04/20/24  0700 04/19/24  1233   WBC 10*3/mm3  --   --  8.77   HEMOGLOBIN g/dL  --   --  14.0   HEMATOCRIT %  --   --  41.4   PLATELETS 10*3/mm3  --   --  188   GLUCOSE mg/dL 85   < > 103*   CREATININE mg/dL 0.80   < > 0.90   BUN mg/dL 6   < > 3*   SODIUM mmol/L 142   < > 141   POTASSIUM mmol/L 3.5   < > 3.8   AST (SGOT) U/L  --   --  272*   ALT (SGPT) U/L  --   --  76*   ALK PHOS U/L  --   --  65   BILIRUBIN mg/dL  --   --  0.3   ANION GAP mmol/L 12.0   < > 9.0    < > = values in this interval not displayed.       No radiology results for the last day      I have personally reviewed the patient's new results.     Assessment/Plan:     Active and Resolved Problems    Suicide attempt  Rhabdomyolysis  History of alcohol abuse  TYLER  Metabolic acidosis resolved    Suggestion:    4/21.  At this time IV fluid has been discontinued.  Continue supportive care.  Discharge to inpatient psych unit once everything is arranged    4/20.  Patient is medically stable.  Patient can be transferred to psych unit for suicide attempt and ideations.    4/19.  Patient is medically stable.  Psychiatry is following.  Patient will need to go to inpatient psych for suicide attempt.  Continue IV hydration and nephrology is following.  Will discharge patient to psych unit once everything is arranged    DVT prophylaxis:  No DVT prophylaxis order currently exists.         Code status is   Code Status and Medical Interventions:  "  Ordered at: 24 1720     Code Status (Patient has no pulse and is not breathing):    CPR (Attempt to Resuscitate)     Medical Interventions (Patient has pulse or is breathing):    Full Support       Plan for disposition:     Time: 30 minutes    Signature: Electronically signed by Gerardo Toledo MD, 24, 10:49 EDT.  [facility] Hospitalist Team    Electronically signed by Gerardo Toledo MD at 24 1050       Maikol Mclean MD at 24 0832          NEPHROLOGY PROGRESS NOTE------[facility]    [facility]  [phone]  Maikol Mclean MD      Patient Care Team:  Keri Shrestha APRN as PCP - General (Nurse Practitioner)  Maikol Mclean MD as Consulting Physician (Nephrology)      Provider:  Maikol Mclean MD  Patient Name: Keagan Valenzuela  :  1985    SUBJECTIVE:  Follow-up TYLER/rhabdomyolysis  No chest pain or shortness of breath    Medication:  cefTRIAXone, 1,000 mg, Intravenous, Q24H  pantoprazole, 40 mg, Intravenous, Q12H  sertraline, 50 mg, Oral, Daily  sodium chloride, 10 mL, Intravenous, Q12H      sodium chloride, 125 mL/hr, Last Rate: 125 mL/hr (24 1510)        OBJECTIVE    Vital Sign Min/Max for last 24 hours  Temp  Min: 97.6 °F (36.4 °C)  Max: 99.2 °F (37.3 °C)   BP  Min: 90/52  Max: 127/83   Pulse  Min: 77  Max: 106   Resp  Min: 15  Max: 19   SpO2  Min: 96 %  Max: 97 %   No data recorded   No data recorded     Flowsheet Rows      Flowsheet Row First Filed Value   Admission Height 177.8 cm (70\") Documented at 2024 1340   Admission Weight 58 kg (127 lb 13.9 oz) Documented at 2024 1340            No intake/output data recorded.  I/O last 3 completed shifts:  In: 1100 [P.O.:1100]  Out: -     Physical Exam:  General Appearance: alert, appears stated age and cooperative  Head: normocephalic, without obvious abnormality and atraumatic  Eyes: conjunctivae and sclerae normal and no " "icterus  Neck: supple and no JVD  Lungs: clear to auscultation and respirations regular  Heart: regular rhythm & normal rate and normal S1, S2  Chest: Wall no abnormalities observed  Abdomen: normal bowel sounds and soft, nontender  Extremities: moves extremities well, no edema, no cyanosis and no redness  Skin: no bleeding, bruising or rash, turgor normal, color normal and no lesions noted  Neurologic: Alert, and oriented. No focal deficits    Labs:    WBC WBC   Date Value Ref Range Status   04/19/2024 8.77 3.40 - 10.80 10*3/mm3 Final   04/18/2024 7.73 3.40 - 10.80 10*3/mm3 Final      HGB Hemoglobin   Date Value Ref Range Status   04/19/2024 14.0 13.0 - 17.7 g/dL Final   04/18/2024 12.8 (L) 13.0 - 17.7 g/dL Final      HCT Hematocrit   Date Value Ref Range Status   04/19/2024 41.4 37.5 - 51.0 % Final   04/18/2024 38.6 37.5 - 51.0 % Final      Platelets No results found for: \"LABPLAT\"   MCV MCV   Date Value Ref Range Status   04/19/2024 88.5 79.0 - 97.0 fL Final   04/18/2024 89.8 79.0 - 97.0 fL Final          Sodium Sodium   Date Value Ref Range Status   04/21/2024 142 136 - 145 mmol/L Final   04/20/2024 144 136 - 145 mmol/L Final   04/19/2024 141 136 - 145 mmol/L Final   04/18/2024 140 136 - 145 mmol/L Final      Potassium Potassium   Date Value Ref Range Status   04/21/2024 3.5 3.5 - 5.2 mmol/L Final   04/20/2024 3.7 3.5 - 5.2 mmol/L Final   04/19/2024 3.8 3.5 - 5.2 mmol/L Final   04/18/2024 3.8 3.5 - 5.2 mmol/L Final      Chloride Chloride   Date Value Ref Range Status   04/21/2024 103 98 - 107 mmol/L Final   04/20/2024 105 98 - 107 mmol/L Final   04/19/2024 103 98 - 107 mmol/L Final   04/18/2024 108 (H) 98 - 107 mmol/L Final      CO2 CO2   Date Value Ref Range Status   04/21/2024 27.0 22.0 - 29.0 mmol/L Final   04/20/2024 29.0 22.0 - 29.0 mmol/L Final   04/19/2024 29.0 22.0 - 29.0 mmol/L Final   04/18/2024 26.0 22.0 - 29.0 mmol/L Final      BUN BUN   Date Value Ref Range Status   04/21/2024 6 6 - 20 mg/dL Final " "  04/20/2024 4 (L) 6 - 20 mg/dL Final   04/19/2024 3 (L) 6 - 20 mg/dL Final   04/18/2024 9 6 - 20 mg/dL Final      Creatinine Creatinine   Date Value Ref Range Status   04/21/2024 0.80 0.76 - 1.27 mg/dL Final   04/20/2024 0.87 0.76 - 1.27 mg/dL Final   04/19/2024 0.90 0.76 - 1.27 mg/dL Final   04/18/2024 1.14 0.76 - 1.27 mg/dL Final      Calcium Calcium   Date Value Ref Range Status   04/21/2024 9.3 8.6 - 10.5 mg/dL Final   04/20/2024 8.9 8.6 - 10.5 mg/dL Final   04/19/2024 8.8 8.6 - 10.5 mg/dL Final   04/18/2024 8.5 (L) 8.6 - 10.5 mg/dL Final      PO4 No components found for: \"PO4\"   Albumin Albumin   Date Value Ref Range Status   04/21/2024 3.7 3.5 - 5.2 g/dL Final   04/20/2024 3.6 3.5 - 5.2 g/dL Final   04/19/2024 3.6 3.5 - 5.2 g/dL Final   04/18/2024 3.5 3.5 - 5.2 g/dL Final      Magnesium No results found for: \"MG\"   Uric Acid No components found for: \"URIC ACID\"     Imaging Results (Last 72 Hours)       ** No results found for the last 72 hours. **            Results for orders placed during the hospital encounter of 04/17/24    XR Chest 1 View    Narrative  XR CHEST 1 VW    Date of Exam: 4/17/2024 2:04 PM EDT    Indication: AMS Protocol  AMS Protocol    Comparison: None available.    Findings:  No acute airspace disease. Heart size is within normal limits. No pleural effusion or pneumothorax or acute osseous abnormality    Impression  Impression:  No acute chest finding.      Electronically Signed: Vane Silva MD  4/17/2024 2:07 PM EDT  Workstation ID: [id number]            ASSESSMENT / PLAN      Overdose    TYLER-likely ATN related to rhabdomyolysis  Acute rhabdomyolysis-traumatic/fall.  Found on floor.  Aggressive IV hydration.  Trend CK  Drug overdose-Benadryl/NyQuil/DayQuil  Hypokalemia on admission-resolved  Mild metabolic acidosis-resolved     CR better, CK trending down, < 9k now  Trend CK  Can d/c fluids later today  Will sign off/please call for any further questions        Maikol Mclean, " MD  [facility]  [phone]  04/21/24  08:32 EDT      Electronically signed by Maikol Mclean MD at 04/21/24 1306       Umm Astorga DNP, APRN at 04/20/24 1235              Chief complaint Depression, SI    Subjective .     History of present illness:   The patient is a 38 y.o. male who was admitted secondary to overdose.  Patient was found on the floor unconscious.  Past medical history includes depression.  Patient reports he intentionally overdosed on Benadryl and NyQuil and rubbing alcohol with the intent to harm himself and die.  No other previous attempts   Denies specific plan      Patient has a long history of depression hospitalized in 2018 and seen by [facility].  Patient reports that he started an antidepressant at that time cannot recall which one.  Not currently taking any medications at this time.     Patient reports misusing cold and cough medicine to get high.  Evasive with details or length of time.  History of hallucinations related to cough and cold medicine misuse.  Denies other substance use including alcohol     Family at bedside report patient lives alone.  Irritable.  Limited support  Does not work, mostly isolated.  Has been struggling with depression for some time.     Patient endorses SI, denies plan  Denies anxiety  Depression associated with low mood, low energy, feels helpless helpless.  Denies AVH/HI  Not aggressive or acutely agitated at this time    Today patient reports continues to endorse SI, with plan to jump off a bridge.  Denies access  Patient's family at bedside again today  Zoloft was started yesterday, patient appears to be tolerating  Patient is often selectively mute, appears to be bored, not interested in interview.  Interview somewhat limited  Per nursing staff no events overnight  Not acutely agitated or aggressive    The following portions of the patient's history were reviewed and updated as appropriate: allergies,  "current medications, past family history, past medical history, past social history, past surgical history and problem list.    History    Objective     Vital Signs   /72   Pulse 106   Temp 99.1 °F (37.3 °C) (Oral)   Resp 17   Ht 177.8 cm (70\")   Wt 58 kg (127 lb 13.9 oz)   SpO2 97%   BMI 18.35 kg/m²       MENTAL STATUS EXAM   General Appearance:  Cleanly groomed and dressed  Attitude:  Evasive  Motor Activity:  Normal gait, posture  Speech:  Minimal spontaneity and selectively mute  Mood and affect:  Irritable and depressed  Thought Process:  Goal-directed and linear  Associations/ Thought Content:  No delusions  Hallucinations:  None  Suicidal Ideations:  Other  Other Comment:  Endorses SI with vague plan, no access  Homicidal Ideation:  Not present  Sensorium:  Alert and clear  Orientation:  Person, place, time and situation  Attention Span/ Concentration:  Selective attention  Intellectual Functioning:  Average range  Insight:  Limited  Judgement:  Limited  Reliability:  Fair  Impulse Control:  Fair         Assessment & Plan       Overdose       Assessment:  Depression with SI, intentional overdose      1. Altered mental status, unspecified altered mental status type    2. Non-traumatic rhabdomyolysis    3. Hematemesis, unspecified whether nausea present    4. Anticholinergic drug overdose, undetermined intent, initial encounter      Treatment Plan:  Pt presents after intentional OD. Pt suffers from severe depression. Not currently on medications.  Patient would likely benefit from SSRI for depression and appropriate at this time for inpatient psychiatric treatment.  Agreeable to inpatient psychiatric treatment  Agreeable to medication management  Endorsing SI, with vague plan of jumping off bridge.   Family has safety concerns if patient were to leave hospital, agreeable to be here at this time, can initiate 24-hour to 48-hour medical hold if patient tries to leave before medically clear.  Continue " SI precautions and sitter  Increase sertraline 50 mg daily for depression  Will follow  Treatment Plan discussed with: Patient and Family    I discussed the patients findings and my recommendations with patient, family, and nursing staff    I have reviewed and approved the behavioral health treatment plans and problem list. Yes  Thank you for the consult   Referring MD has access to consult report and progress notes in EMR     Umm Astorga DNP, APRN  24  12:35 EDT             Electronically signed by Umm Astorga DNP, APRN at 24 1241       Gerardo Toledo MD at 24 [facility]6              [facility] MEDICINE SERVICE  DAILY PROGRESS NOTE    NAME: Keagan Valenzuela  : 1985  MRN: [id number]      LOS: 3 days     PROVIDER OF SERVICE: Gerardo Toledo MD    Chief Complaint: Overdose    History of Present Illness:   This is a 37 years old male with no significant past medical history who was brought in after he was found unconscious at home.  Per family he has been acting strange and isolating himself, looks depressed and was recently seen in the ED, he was discharged home and instructed to stay away from Benadryl and NyQuil.  Given his family did not hear from him today EMS was called, his stool was broken and patient was followed unresponsive on the floor with empty bottles of Benadryl, NyQuil, DayQuil and he was then brought to the ED for further workup and management     Vital signs on presentation showed a blood pressure of 135/95, afebrile, respiratory rate 16 saturating 97% on room air with a heart rate of 100.      Labs showed creatinine kinase 13,383, sodium 140, potassium 3.4, bicarb 25, anion gap 16, WBC 13.3 otherwise CBC within normal limit, urinalysis was positive for WBC and protein, urine culture was negative.     Chest x-ray showed no acute chest finding  CT abdomen and pelvis showed generalized air of fluid distention of the bowel, greatest in the ascending colon  and transverse colon. Findings favor the presence of diffuse ileus.  There is some layering high density material within the lumen of the stomach, which could represent ingested material or perhaps could represent a small amount of blood products, given the provided history of hematemesis. Tiny dots of air are seen along the periphery of the ascending colon, favored represent air within the lumen of the bowel rather than that of pneumatosis.     Subjective:     Interval History:    4/19.  Patient remained stable overnight.  Today's lab work is pending.  Discussed with father.  Patient feels depressed.  Was admitted with suicide attempt.  Psychiatry is following    4/20.  Patient remained stable overnight,, very withdrawn.  CK has improved to 14 K and creatinine is normal.  Encouraged him to drink plenty of fluid    Review of Systems:   Negative except what is listed above     Objective:     Vital Signs  Temp:  [97.9 °F (36.6 °C)-98.4 °F (36.9 °C)] 97.9 °F (36.6 °C)  Heart Rate:  [] 72  Resp:  [13-18] 13  BP: (110-130)/(61-86) 110/72   Body mass index is 18.35 kg/m².    Physical Exam    General Appearance:    Alert, cooperative, in no acute distress   Head:    Normocephalic, without obvious abnormality, atraumatic   Eyes:            conjunctivae and sclerae normal, no   icterus, no pallor, corneas  clear, PERRLA   Neck:   No adenopathy, supple, trachea midline, no thyromegaly, no   carotid bruit, no JVD   Lungs:     Clear to auscultation,respirations regular, even and                  unlabored    Heart:    Regular rhythm and normal rate, normal S1 and S2, no            murmur, no gallop, no rub, no click   Abdomen:     Normal bowel sounds, no masses, no organomegaly, soft        non-tender, non-distended, no guarding, no rebound                No tenderness   Extremities:   Moves all extremities well, no edema, no cyanosis, no             redness   Lymph nodes:   No palpable adenopathy   Neurologic:   Cranial  nerves 2 - 12 grossly intact, sensation intact, DTR       present and equal bilaterally       Scheduled Meds   cefTRIAXone, 1,000 mg, Intravenous, Q24H  pantoprazole, 40 mg, Intravenous, Q12H  sertraline, 25 mg, Oral, Daily  sodium chloride, 10 mL, Intravenous, Q12H       PRN Meds     senna-docusate sodium **AND** polyethylene glycol **AND** bisacodyl **AND** bisacodyl    dextrose    dextrose    glucagon (human recombinant)    LORazepam    LORazepam    LORazepam    ondansetron ODT **OR** ondansetron    sodium chloride    sodium chloride    sodium chloride   Infusions  dextrose 5 % and sodium chloride 0.9 %, 150 mL/hr, Last Rate: 150 mL/hr (04/19/24 1043)          Diagnostic Data    Results from last 7 days   Lab Units 04/20/24  0700 04/19/24  1233   WBC 10*3/mm3  --  8.77   HEMOGLOBIN g/dL  --  14.0   HEMATOCRIT %  --  41.4   PLATELETS 10*3/mm3  --  188   GLUCOSE mg/dL 99 103*   CREATININE mg/dL 0.87 0.90   BUN mg/dL 4* 3*   SODIUM mmol/L 144 141   POTASSIUM mmol/L 3.7 3.8   AST (SGOT) U/L  --  272*   ALT (SGPT) U/L  --  76*   ALK PHOS U/L  --  65   BILIRUBIN mg/dL  --  0.3   ANION GAP mmol/L 10.0 9.0       No radiology results for the last day      I have personally reviewed the patient's new results.     Assessment/Plan:     Active and Resolved Problems    Suicide attempt  Rhabdomyolysis  History of alcohol abuse  TYLER  Metabolic acidosis resolved    Suggestion:    4/20.  Patient is medically stable.  Patient can be transferred to psych unit for suicide attempt and ideations.    4/19.  Patient is medically stable.  Psychiatry is following.  Patient will need to go to inpatient psych for suicide attempt.  Continue IV hydration and nephrology is following.  Will discharge patient to psych unit once everything is arranged    DVT prophylaxis:  No DVT prophylaxis order currently exists.         Code status is   Code Status and Medical Interventions:   Ordered at: 04/17/24 1720     Code Status (Patient has no pulse and  "is not breathing):    CPR (Attempt to Resuscitate)     Medical Interventions (Patient has pulse or is breathing):    Full Support       Plan for disposition:     Time: 30 minutes    Signature: Electronically signed by Gerardo Toledo MD, 24, 10:06 EDT.  [facility] Hospitalist Team    Electronically signed by Gerardo Toledo MD at 24 1007       Maikol Mclean MD at 24 0924          NEPHROLOGY PROGRESS NOTE------[facility]/OPT    [facility]  [phone]  Maikol Mclean MD      Patient Care Team:  Keri Shrestha APRN as PCP - General (Nurse Practitioner)  Maikol Mclean MD as Consulting Physician (Nephrology)      Provider:  Maikol Mclean MD  Patient Name: Keagan Valenzuela  :  1985    SUBJECTIVE:  Follow-up TYLER/rhabdomyolysis  No chest pain or shortness of breath    Medication:  cefTRIAXone, 1,000 mg, Intravenous, Q24H  pantoprazole, 40 mg, Intravenous, Q12H  sertraline, 25 mg, Oral, Daily  sodium chloride, 10 mL, Intravenous, Q12H      dextrose 5 % and sodium chloride 0.9 %, 150 mL/hr, Last Rate: 150 mL/hr (243)        OBJECTIVE    Vital Sign Min/Max for last 24 hours  Temp  Min: 97.9 °F (36.6 °C)  Max: 98.4 °F (36.9 °C)   BP  Min: 110/72  Max: 130/82   Pulse  Min: 72  Max: 115   Resp  Min: 13  Max: 18   SpO2  Min: 94 %  Max: 99 %   No data recorded   No data recorded     Flowsheet Rows      Flowsheet Row First Filed Value   Admission Height 177.8 cm (70\") Documented at 2024 1340   Admission Weight 58 kg (127 lb 13.9 oz) Documented at 2024 1340            No intake/output data recorded.  I/O last 3 completed shifts:  In: 2845 [P.O.:480; I.V.:2365]  Out: 4150 [Urine:4150]    Physical Exam:  General Appearance: alert, appears stated age and cooperative  Head: normocephalic, without obvious abnormality and atraumatic  Eyes: conjunctivae and sclerae normal and no icterus  Neck: supple " "and no JVD  Lungs: clear to auscultation and respirations regular  Heart: regular rhythm & normal rate and normal S1, S2  Chest: Wall no abnormalities observed  Abdomen: normal bowel sounds and soft, nontender  Extremities: moves extremities well, no edema, no cyanosis and no redness  Skin: no bleeding, bruising or rash, turgor normal, color normal and no lesions noted  Neurologic: Alert, and oriented. No focal deficits    Labs:    WBC WBC   Date Value Ref Range Status   04/19/2024 8.77 3.40 - 10.80 10*3/mm3 Final   04/18/2024 7.73 3.40 - 10.80 10*3/mm3 Final   04/17/2024 9.60 3.40 - 10.80 10*3/mm3 Final   04/17/2024 13.31 (H) 3.40 - 10.80 10*3/mm3 Final      HGB Hemoglobin   Date Value Ref Range Status   04/19/2024 14.0 13.0 - 17.7 g/dL Final   04/18/2024 12.8 (L) 13.0 - 17.7 g/dL Final   04/17/2024 12.8 (L) 13.0 - 17.7 g/dL Final   04/17/2024 14.0 12.0 - 17.0 g/dL Final   04/17/2024 14.7 13.0 - 17.7 g/dL Final      HCT Hematocrit   Date Value Ref Range Status   04/19/2024 41.4 37.5 - 51.0 % Final   04/18/2024 38.6 37.5 - 51.0 % Final   04/17/2024 37.5 37.5 - 51.0 % Final   04/17/2024 41 38 - 51 % Final   04/17/2024 40.9 37.5 - 51.0 % Final      Platelets No results found for: \"LABPLAT\"   MCV MCV   Date Value Ref Range Status   04/19/2024 88.5 79.0 - 97.0 fL Final   04/18/2024 89.8 79.0 - 97.0 fL Final   04/17/2024 87.4 79.0 - 97.0 fL Final   04/17/2024 84.5 79.0 - 97.0 fL Final          Sodium Sodium   Date Value Ref Range Status   04/20/2024 144 136 - 145 mmol/L Final   04/19/2024 141 136 - 145 mmol/L Final   04/18/2024 140 136 - 145 mmol/L Final   04/17/2024 140 136 - 145 mmol/L Final   04/17/2024 140 136 - 145 mmol/L Final      Potassium Potassium   Date Value Ref Range Status   04/20/2024 3.7 3.5 - 5.2 mmol/L Final   04/19/2024 3.8 3.5 - 5.2 mmol/L Final   04/18/2024 3.8 3.5 - 5.2 mmol/L Final   04/17/2024 4.3 3.5 - 5.2 mmol/L Final     Comment:     Slight hemolysis detected by analyzer. Result may be falsely " "elevated.  Result checked     04/17/2024 3.4 (L) 3.5 - 5.2 mmol/L Final     Comment:     Slight hemolysis detected by analyzer. Result may be falsely elevated.      Chloride Chloride   Date Value Ref Range Status   04/20/2024 105 98 - 107 mmol/L Final   04/19/2024 103 98 - 107 mmol/L Final   04/18/2024 108 (H) 98 - 107 mmol/L Final   04/17/2024 106 98 - 107 mmol/L Final   04/17/2024 99 98 - 107 mmol/L Final      CO2 CO2   Date Value Ref Range Status   04/20/2024 29.0 22.0 - 29.0 mmol/L Final   04/19/2024 29.0 22.0 - 29.0 mmol/L Final   04/18/2024 26.0 22.0 - 29.0 mmol/L Final   04/17/2024 23.0 22.0 - 29.0 mmol/L Final   04/17/2024 25.0 22.0 - 29.0 mmol/L Final      BUN BUN   Date Value Ref Range Status   04/20/2024 4 (L) 6 - 20 mg/dL Final   04/19/2024 3 (L) 6 - 20 mg/dL Final   04/18/2024 9 6 - 20 mg/dL Final   04/17/2024 8 6 - 20 mg/dL Final   04/17/2024 10 6 - 20 mg/dL Final      Creatinine Creatinine   Date Value Ref Range Status   04/20/2024 0.87 0.76 - 1.27 mg/dL Final   04/19/2024 0.90 0.76 - 1.27 mg/dL Final   04/18/2024 1.14 0.76 - 1.27 mg/dL Final   04/17/2024 1.23 0.76 - 1.27 mg/dL Final   04/17/2024 1.16 0.60 - 1.30 mg/dL Final     Comment:     Serial Number: [id number]:  [id number]   04/17/2024 1.20 0.76 - 1.27 mg/dL Final      Calcium Calcium   Date Value Ref Range Status   04/20/2024 8.9 8.6 - 10.5 mg/dL Final   04/19/2024 8.8 8.6 - 10.5 mg/dL Final   04/18/2024 8.5 (L) 8.6 - 10.5 mg/dL Final   04/17/2024 8.7 8.6 - 10.5 mg/dL Final   04/17/2024 10.0 8.6 - 10.5 mg/dL Final      PO4 No components found for: \"PO4\"   Albumin Albumin   Date Value Ref Range Status   04/20/2024 3.6 3.5 - 5.2 g/dL Final   04/19/2024 3.6 3.5 - 5.2 g/dL Final   04/18/2024 3.5 3.5 - 5.2 g/dL Final   04/17/2024 3.7 3.5 - 5.2 g/dL Final   04/17/2024 4.7 3.5 - 5.2 g/dL Final      Magnesium No results found for: \"MG\"   Uric Acid No components found for: \"URIC ACID\"     Imaging Results (Last 72 Hours)       Procedure Component Value " Units Date/Time    CT Abdomen Pelvis With Contrast [[id number]] Collected: 04/17/24 1509     Updated: 04/17/24 1520    Narrative:      CT ABDOMEN PELVIS W CONTRAST    Date of Exam: 4/17/2024 3:02 PM EDT    Indication: vomiting blood. AMS.    Comparison: None available.    Technique: Axial CT images were obtained of the abdomen and pelvis following the uneventful intravenous administration of iodinated contrast. Sagittal and coronal reconstructions were performed.  Automated exposure control and iterative reconstruction   methods were used.        Findings:  Layering high density material is seen within the stomach, which could represent blood products, given the history of hematemesis, or could represent ingested high density material within the gastric lumen. No gastric mass or focal gastric inflammatory   change is appreciated.    There is moderate air-fluid distention of the ascending colon and transverse colon and splenic flexure of the colon. More mild air-fluid distention is seen within the large and small bowel loops. The overall pattern is favored to represent generalized   ileus.    The appendix is filled with air and appears normal (series 4 image 87).    Small dots of air are seen along the walls of the ascending colon, thought to be within the lumen of the bowel rather than representing bowel wall pneumatosis.    No free air or free fluid or adenopathy is seen within the abdomen/pelvis.    Urinary bladder, prostate and rectum are normal.    Liver, gallbladder, spleen, pancreas, adrenals, and kidneys are within normal limits.    The lung bases are clear. The heart size is within normal limits.    No acute or suspicious osseous abnormalities are identified.      Impression:      Impression:    1. Generalized air of fluid distention of the bowel, greatest in the ascending colon and transverse colon. Findings favor the presence of diffuse ileus.  2. There is some layering high density material within the  lumen of the stomach, which could represent ingested material or perhaps could represent a small amount of blood products, given the provided history of hematemesis. No focal gastric inflammatory   change or mass lesion is identified.  3. Tiny dots of air are seen along the periphery of the ascending colon, favored represent air within the lumen of the bowel rather than that of pneumatosis. However, to be on the safe side, I would recommend short-term abdominal x-ray/KUB follow-up to   ensure stability, improvement or resolution.          Electronically Signed: Vane Silva MD    4/17/2024 3:18 PM EDT    Workstation ID: [id number]    CT Head Without Contrast [[id number]] Collected: 04/17/24 1510     Updated: 04/17/24 1514    Narrative:      CT HEAD WO CONTRAST    Date of Exam: 4/17/2024 3:02 PM EDT    Indication: AMS.    Comparison: None available.    Technique: Axial CT images were obtained of the head without contrast administration.  Coronal reconstructions were performed.  Automated exposure control and iterative reconstruction methods were used.    Findings: Exam limited by patient motion throughout the study. Gray-white matter differentiation is maintained without evidence of an acute infarction. No intracranial mass or mass effect. No extra-axial mass or collection. The ventricles and sulci are   normal in size and configuration. The posterior fossa appears normal. Sellar and suprasellar structures are normal.    Orbital and paranasal soft tissues are normal. The paranasal sinuses, ethmoid air cells, and mastoid air cells are aerated. The bony calvarium appears intact. No acute fractures. No lytic or blastic bony diseases. Cerumen within the external acoustic   canals.      Impression:      Impression: No acute intracranial pathology. Examination limited due to patient motion.          Electronically Signed: Davis Hassan MD    4/17/2024 3:12 PM EDT    Workstation ID: [id number]    XR Chest 1 View [[id number]]  Collected: 04/17/24 1407     Updated: 04/17/24 1410    Narrative:      XR CHEST 1 VW    Date of Exam: 4/17/2024 2:04 PM EDT    Indication: AMS Protocol  AMS Protocol    Comparison: None available.    Findings:  No acute airspace disease. Heart size is within normal limits. No pleural effusion or pneumothorax or acute osseous abnormality      Impression:      Impression:  No acute chest finding.      Electronically Signed: Vane Silva MD    4/17/2024 2:07 PM EDT    Workstation ID: [id number]            Results for orders placed during the hospital encounter of 04/17/24    XR Chest 1 View    Narrative  XR CHEST 1 VW    Date of Exam: 4/17/2024 2:04 PM EDT    Indication: AMS Protocol  AMS Protocol    Comparison: None available.    Findings:  No acute airspace disease. Heart size is within normal limits. No pleural effusion or pneumothorax or acute osseous abnormality    Impression  Impression:  No acute chest finding.      Electronically Signed: Vane Silva MD  4/17/2024 2:07 PM EDT  Workstation ID: [id number]            ASSESSMENT / PLAN      Overdose    TYLER-likely ATN related to rhabdomyolysis  Acute rhabdomyolysis-traumatic/fall.  Found on floor.  Aggressive IV hydration.  Trend CK  Drug overdose-Benadryl/NyQuil/DayQuil  Hypokalemia on admission-resolved  Mild metabolic acidosis-resolved     CR better, CK trending down  Continue IVF x 24 hrss  Trend CK  Monitor renal function fluid status electrolytes  Discussed with family and RN at bedside        Maikol Mclean MD  [facility]  [phone]  04/20/24  09:24 EDT      Electronically signed by Maikol Mclean MD at 04/20/24 1227       Consult Notes (last 48 hours)  Notes from 04/20/24 0829 through 04/22/24 0829   No notes of this type exist for this encounter.

## 2024-04-22 NOTE — DISCHARGE SUMMARY
[facility] Medicine Services  Discharge Summary    Date of Service: 24  Patient Name: Keagan Valenzuela  : 1985  MRN: [id number]    Date of Admission: 2024  Date of Discharge:  24  Primary Care Physician: Keri Shrestha APRN      Presenting Problem:   Overdose [T50.901A]  Non-traumatic rhabdomyolysis [M62.82]  Anticholinergic drug overdose, undetermined intent, initial encounter [T44.3X4A]  Altered mental status, unspecified altered mental status type [R41.82]  Hematemesis, unspecified whether nausea present [K92.0]    Active and Resolved Hospital Problems:  Active Hospital Problems    Diagnosis POA   • **Overdose [T50.901A] Yes      Resolved Hospital Problems   No resolved problems to display.         Hospital Course     HPI:This is a 37 years old male with no significant past medical history who was brought in after he was found unconscious at home.  Per family he has been acting strange and isolating himself, looks depressed and was recently seen in the ED, he was discharged home and instructed to stay away from Benadryl and NyQuil.  Given his family did not hear from him today EMS was called, his stool was broken and patient was followed unresponsive on the floor with empty bottles of Benadryl, NyQuil, DayQuil and he was then brought to the ED for further workup and management     Vital signs on presentation showed a blood pressure of 135/95, afebrile, respiratory rate 16 saturating 97% on room air with a heart rate of 100.      Labs showed creatinine kinase 13,383, sodium 140, potassium 3.4, bicarb 25, anion gap 16, WBC 13.3 otherwise CBC within normal limit, urinalysis was positive for WBC and protein, urine culture was negative.     Chest x-ray showed no acute chest finding  CT abdomen and pelvis showed generalized air of fluid distention of the bowel, greatest in the ascending colon and transverse colon. Findings favor the presence of diffuse  ileus.  There is some layering high density material within the lumen of the stomach, which could represent ingested material or perhaps could represent a small amount of blood products, given the provided history of hematemesis. Tiny dots of air are seen along the periphery of the ascending colon, favored represent air within the lumen of the bowel rather than that of pneumatosis.       Hospital Course:    4/19.  Patient remained stable overnight.  Today's lab work is pending.  Discussed with father.  Patient feels depressed.  Was admitted with suicide attempt.  Psychiatry is following     4/20.  Patient remained stable overnight,, very withdrawn.  CK has improved to 14 K and creatinine is normal.  Encouraged him to drink plenty of fluid     4/22.  Patient remained stable overnight.  Discussed with the staff and discontinued IV fluids     Active and Resolved Problems     Suicide attempt  Rhabdomyolysis  History of alcohol abuse  TYLER  Metabolic acidosis resolved     Suggestion:    4/22-addendum- it seems that psychiatry is okay for this patient to be discharged home as they do not Wanna admit this patient in [facility] psychiatry unit.  I was communicated by Psychiatry Department bed this patient is okay to be discharged home on Zoloft so I will discharge patient home with Zoloft.    4/22.  Patient remained stable overnight.  Patient was discharged to psychiatry unit for suicidal ideation once everything is arranged     4/21.  At this time IV fluid has been discontinued.  Continue supportive care.  Discharge to inpatient psych unit once everything is arranged     4/20.  Patient is medically stable.  Patient can be transferred to psych unit for suicide attempt and ideations.     4/19.  Patient is medically stable.  Psychiatry is following.  Patient will need to go to inpatient psych for suicide attempt.  Continue IV hydration and nephrology is following.  Will discharge patient to psych unit once everything is  arranged           Day of Discharge     Vital Signs:  Temp:  [98 °F (36.7 °C)-98.2 °F (36.8 °C)] 98.1 °F (36.7 °C)  Heart Rate:  [72-85] 76  Resp:  [13-24] 20  BP: ()/(54-89) 106/67    Physical Exam:  General Appearance:    Alert, cooperative, in no acute distress   Head:    Normocephalic, without obvious abnormality, atraumatic   Eyes:            conjunctivae and sclerae normal, no   icterus, no pallor, corneas  clear, PERRLA   Neck:   No adenopathy, supple, trachea midline, no thyromegaly, no   carotid bruit, no JVD   Lungs:     Clear to auscultation,respirations regular, even and                  unlabored    Heart:    Regular rhythm and normal rate, normal S1 and S2, no            murmur, no gallop, no rub, no click   Abdomen:     Normal bowel sounds, no masses, no organomegaly, soft        non-tender, non-distended, no guarding, no rebound                No tenderness   Extremities:   Moves all extremities well, no edema, no cyanosis, no             redness   Lymph nodes:   No palpable adenopathy   Neurologic:   Cranial nerves 2 - 12 grossly intact, sensation intact, DTR       present and equal bilaterally          Pertinent  and/or Most Recent Results     LAB RESULTS:      Lab 04/19/24  1233 04/18/24  0922 04/17/24  2318 04/17/24  2120 04/17/24  1405   WBC 8.77 7.73 9.60  --  13.31*   HEMOGLOBIN 14.0 12.8* 12.8*  --  14.7   HEMOGLOBIN, POC  --   --   --  14.0  --    HEMATOCRIT 41.4 38.6 37.5  --  40.9   HEMATOCRIT POC  --   --   --  41  --    PLATELETS 188 145 164  --  213   NEUTROS ABS 7.47* 6.07 7.98*  --  11.15*   IMMATURE GRANS (ABS) 0.02 0.02 0.03  --  0.05   LYMPHS ABS 0.83 1.13 1.13  --  1.49   MONOS ABS 0.40 0.46 0.44  --  0.60   EOS ABS 0.03 0.03 0.01  --  0.01   MCV 88.5 89.8 87.4  --  84.5   LACTATE  --   --  1.0 7.1*  --          Lab 04/22/24  0418 04/21/24  0436 04/20/24  0700 04/19/24  1233 04/18/24  0922   SODIUM 140 142 144 141 140   POTASSIUM 3.7 3.5 3.7 3.8 3.8   CHLORIDE 100 103 105  103 108*   CO2 29.0 27.0 29.0 29.0 26.0   ANION GAP 11.0 12.0 10.0 9.0 6.0   BUN 10 6 4* 3* 9   CREATININE 0.84 0.80 0.87 0.90 1.14   EGFR 113.8 115.5 112.6 111.4 83.9   GLUCOSE 92 85 99 103* 88   CALCIUM 9.7 9.3 8.9 8.8 8.5*   PHOSPHORUS 3.3 3.3 3.0 2.3*  --          Lab 04/22/24  0418 04/21/24  0436 04/20/24  0700 04/19/24  1233 04/18/24  0922 04/17/24  2318 04/17/24  1405   TOTAL PROTEIN  --   --   --  6.3 5.8* 5.7* 7.1   ALBUMIN 4.0 3.7 3.6 3.6 3.5 3.7 4.7   GLOBULIN  --   --   --  2.7 2.3 2.0 2.4   ALT (SGPT)  --   --   --  76* 61* 51* 31   AST (SGOT)  --   --   --  272* 325* 273* 122*   BILIRUBIN  --   --   --  0.3 0.6 0.6 0.6   ALK PHOS  --   --   --  65 61 63 79                     Lab 04/17/24  2120   FIO2 21     Brief Urine Lab Results  (Last result in the past 365 days)        Color   Clarity   Blood   Leuk Est   Nitrite   Protein   CREAT   Urine HCG        04/17/24 1416 Yellow   Clear   Large (3+)   Negative   Negative   30 mg/dL (1+)                 Microbiology Results (last 10 days)       Procedure Component Value - Date/Time    COVID PRE-OP / PRE-PROCEDURE SCREENING ORDER (NO ISOLATION) - Swab, Nasopharynx [[id number]]  (Normal) Collected: 04/22/24 1133    Lab Status: Final result Specimen: Swab from Nasopharynx Updated: 04/22/24 1219    Narrative:      The following orders were created for panel order COVID PRE-OP / PRE-PROCEDURE SCREENING ORDER (NO ISOLATION) - Swab, Nasopharynx.  Procedure                               Abnormality         Status                     ---------                               -----------         ------                     COVID-19,CEPHEID/ИРИНА,CO...[[id number]]  Normal              Final result                 Please view results for these tests on the individual orders.    COVID-19,CEPHEID/ИРИНА,COR/NATE/PAD/NOREEN/LAG/GOLDEN IN-HOUSE,NP SWAB IN TRANSPORT MEDIA 1 HR TAT, RT-PCR - Swab, Nasopharynx [[id number]]  (Normal) Collected: 04/22/24 1133    Lab Status: Final result  Specimen: Swab from Nasopharynx Updated: 04/22/24 1219     COVID19 Not Detected    Narrative:      Fact sheet for providers: https://www.fda.gov/media/457718/download     Fact sheet for patients: https://www.fda.gov/media/748106/download  Fact sheet for providers: https://www.fda.gov/media/502315/download    Fact sheet for patients: https://www.fda.gov/media/782409/download    Test performed by PCR.    Blood Culture - Blood, Arm, Right [[id number]]  (Normal) Collected: 04/17/24 1809    Lab Status: Preliminary result Specimen: Blood from Arm, Right Updated: 04/21/24 1831     Blood Culture No growth at 4 days    Blood Culture - Blood, Hand, Right [[id number]]  (Normal) Collected: 04/17/24 1809    Lab Status: Preliminary result Specimen: Blood from Hand, Right Updated: 04/21/24 1831     Blood Culture No growth at 4 days    Chlamydia trachomatis, Neisseria gonorrhoeae, PCR - Urine, Urine, Clean Catch [[id number]]  (Normal) Collected: 04/15/24 0854    Lab Status: Final result Specimen: Urine, Clean Catch Updated: 04/15/24 1030     Chlamydia DNA by PCR Not Detected     Neisseria gonorrhoeae by PCR Not Detected            CT Abdomen Pelvis With Contrast    Result Date: 4/17/2024  Impression: Impression: 1. Generalized air of fluid distention of the bowel, greatest in the ascending colon and transverse colon. Findings favor the presence of diffuse ileus. 2. There is some layering high density material within the lumen of the stomach, which could represent ingested material or perhaps could represent a small amount of blood products, given the provided history of hematemesis. No focal gastric inflammatory  change or mass lesion is identified. 3. Tiny dots of air are seen along the periphery of the ascending colon, favored represent air within the lumen of the bowel rather than that of pneumatosis. However, to be on the safe side, I would recommend short-term abdominal x-ray/KUB follow-up to ensure stability, improvement or  resolution. Electronically Signed: Vane Silva MD  4/17/2024 3:18 PM EDT  Workstation ID: [id number]    CT Head Without Contrast    Result Date: 4/17/2024  Impression: Impression: No acute intracranial pathology. Examination limited due to patient motion. Electronically Signed: Davis Hassan MD  4/17/2024 3:12 PM EDT  Workstation ID: [id number]    XR Chest 1 View    Result Date: 4/17/2024  Impression: Impression: No acute chest finding. Electronically Signed: Vane Silva MD  4/17/2024 2:07 PM EDT  Workstation ID: [id number]                 Labs Pending at Discharge:  Pending Labs       Order Current Status    Blood Culture - Blood, Arm, Right Preliminary result    Blood Culture - Blood, Hand, Right Preliminary result            Procedures Performed           Consults:   Consults       Date and Time Order Name Status Description    4/17/2024  9:32 PM IP Consult to Nephrology Completed     4/17/2024  9:09 PM Inpatient Psychiatrist Consult Completed     4/17/2024  8:33 PM Inpatient Psychiatrist Consult      4/17/2024  3:38 PM Gastroenterology (on-call MD unless specified) Completed               Discharge Details        Discharge Medications        New Medications        Instructions Start Date   sertraline 50 MG tablet  Commonly known as: Zoloft   50 mg, Oral, Daily               No Known Allergies      Discharge Disposition:   Psychiatric Hospital or Unit (DC - External or [facility])    Diet:  Hospital:  Diet Order   Procedures   • Diet: Regular/House; Safe Tray; Fluid Consistency: Thin (IDDSI 0)         Discharge Activity:         CODE STATUS:  Code Status and Medical Interventions:   Ordered at: 04/17/24 1720     Code Status (Patient has no pulse and is not breathing):    CPR (Attempt to Resuscitate)     Medical Interventions (Patient has pulse or is breathing):    Full Support         No future appointments.    Additional Instructions for the Follow-ups that You Need to Schedule       Discharge Follow-up with PCP    As directed       Currently Documented PCP:    Keri Shrestha APRN    PCP Phone Number:    [phone]     Follow Up Details: pcp in 2 wks        Discharge Follow-up with Specialty: psych per there regmendation   As directed      Specialty: psych per there regmendation                Time spent on Discharge including face to face service:  >30 minutes    Signature: Electronically signed by Gerardo Toledo MD, 04/22/24, 13:40 EDT.  [facility] Hospitalist Team

## 2024-04-22 NOTE — THERAPY TREATMENT NOTE
Subjective: Pt agreeable to therapeutic plan of care.    Objective:     Bed mobility -  Independent   Transfers -  Supervision   Ambulation -  200' with CGA with lateral sway     Therapeutic Exercise -  10 reps each BLE strengthening exercises in sitting: AP, LAQs, hip flexion, GS     Vitals: WNL    Pain: 0 VAS   Location: n/a  Intervention for pain: N/A    Education: Provided education on the importance of mobility in the acute care setting, Verbal/Tactile Cues, Transfer Training, Gait Training, and Energy conservation strategies    Assessment: Keagan Valenzuela presents with functional mobility impairments which indicate the need for skilled intervention. Tolerating session today without incident. Pt on room air with sitter and family present.  Pt was independent with bed mobility, supervision for transfer and ambulated 200' with lateral sway with CGA.  Pt unable to look in B directions for obstacle navigation without multiple LOB.  Focused on scanning for obstacles and slowly turning head with turns.  Completed 10 reps BLE strengthening exercises in sitting.  Will continue to follow and progress as tolerated.     Plan/Recommendations:   If medically appropriate, inpt psych hospital.  Pt requires no DME at discharge.     Pt desires npt psych hospital at discharge. Pt cooperative; agreeable to therapeutic recommendations and plan of care.       Post-Tx Position: Supine with HOB Elevated, Staff Present, and Call light and personal items within reach  PPE: gloves

## 2024-04-22 NOTE — PLAN OF CARE
Goal Outcome Evaluation:              Outcome Evaluation: Patient slept well during the night.  With no complaints.  Patient is in SI precautions with sitter at bedside.  Pt stable at this time.

## 2024-04-23 LAB
QT INTERVAL: 367 MS
QTC INTERVAL: 482 MS

## 2024-06-13 ENCOUNTER — HOSPITAL ENCOUNTER (EMERGENCY)
Facility: HOSPITAL | Age: 39
Discharge: HOME | End: 2024-06-13
Payer: COMMERCIAL

## 2024-06-13 VITALS
OXYGEN SATURATION: 96 % | WEIGHT: 130.51 LBS | RESPIRATION RATE: 18 BRPM | TEMPERATURE: 98 F | SYSTOLIC BLOOD PRESSURE: 100 MMHG | HEART RATE: 65 BPM | BODY MASS INDEX: 18.68 KG/M2 | DIASTOLIC BLOOD PRESSURE: 71 MMHG | HEIGHT: 70 IN

## 2024-06-13 DIAGNOSIS — R45.89 ANXIETY ABOUT HEALTH: Primary | ICD-10-CM

## 2024-06-13 DIAGNOSIS — H61.20 CERUMEN IN AUDITORY CANAL ON EXAMINATION: ICD-10-CM

## 2024-06-13 LAB
ALBUMIN SERPL-MCNC: 4.6 G/DL (ref 3.5–5.2)
ALBUMIN/GLOB SERPL: 1.8 G/DL
ALP SERPL-CCNC: 72 U/L (ref 39–117)
ALT SERPL W P-5'-P-CCNC: 40 U/L (ref 1–41)
ANION GAP SERPL CALCULATED.3IONS-SCNC: 8.8 MMOL/L (ref 5–15)
AST SERPL-CCNC: 34 U/L (ref 1–40)
BASOPHILS # BLD AUTO: 0.02 10*3/MM3 (ref 0–0.2)
BASOPHILS NFR BLD AUTO: 0.3 % (ref 0–1.5)
BILIRUB SERPL-MCNC: 0.2 MG/DL (ref 0–1.2)
BUN SERPL-MCNC: 13 MG/DL (ref 6–20)
BUN/CREAT SERPL: 12.3 (ref 7–25)
CALCIUM SPEC-SCNC: 9.6 MG/DL (ref 8.6–10.5)
CHLORIDE SERPL-SCNC: 104 MMOL/L (ref 98–107)
CO2 SERPL-SCNC: 30.2 MMOL/L (ref 22–29)
CREAT SERPL-MCNC: 1.06 MG/DL (ref 0.76–1.27)
DEPRECATED RDW RBC AUTO: 43 FL (ref 37–54)
EGFRCR SERPLBLD CKD-EPI 2021: 91 ML/MIN/1.73
EOSINOPHIL # BLD AUTO: 0.05 10*3/MM3 (ref 0–0.4)
EOSINOPHIL NFR BLD AUTO: 0.7 % (ref 0.3–6.2)
ERYTHROCYTE [DISTWIDTH] IN BLOOD BY AUTOMATED COUNT: 13 % (ref 12.3–15.4)
GLOBULIN UR ELPH-MCNC: 2.6 GM/DL
GLUCOSE SERPL-MCNC: 93 MG/DL (ref 65–99)
HCT VFR BLD AUTO: 44.1 % (ref 37.5–51)
HGB BLD-MCNC: 14.9 G/DL (ref 13–17.7)
IMM GRANULOCYTES # BLD AUTO: 0.01 10*3/MM3 (ref 0–0.05)
IMM GRANULOCYTES NFR BLD AUTO: 0.1 % (ref 0–0.5)
LYMPHOCYTES # BLD AUTO: 2.53 10*3/MM3 (ref 0.7–3.1)
LYMPHOCYTES NFR BLD AUTO: 32.9 % (ref 19.6–45.3)
MCH RBC QN AUTO: 30.5 PG (ref 26.6–33)
MCHC RBC AUTO-ENTMCNC: 33.8 G/DL (ref 31.5–35.7)
MCV RBC AUTO: 90.2 FL (ref 79–97)
MONOCYTES # BLD AUTO: 0.62 10*3/MM3 (ref 0.1–0.9)
MONOCYTES NFR BLD AUTO: 8.1 % (ref 5–12)
NEUTROPHILS NFR BLD AUTO: 4.45 10*3/MM3 (ref 1.7–7)
NEUTROPHILS NFR BLD AUTO: 57.9 % (ref 42.7–76)
NRBC BLD AUTO-RTO: 0 /100 WBC (ref 0–0.2)
PLATELET # BLD AUTO: 210 10*3/MM3 (ref 140–450)
PMV BLD AUTO: 10.6 FL (ref 6–12)
POTASSIUM SERPL-SCNC: 3.7 MMOL/L (ref 3.5–5.2)
PROT SERPL-MCNC: 7.2 G/DL (ref 6–8.5)
RBC # BLD AUTO: 4.89 10*6/MM3 (ref 4.14–5.8)
SODIUM SERPL-SCNC: 143 MMOL/L (ref 136–145)
WBC NRBC COR # BLD AUTO: 7.68 10*3/MM3 (ref 3.4–10.8)

## 2024-06-13 PROCEDURE — 99283 EMERGENCY DEPT VISIT LOW MDM: CPT

## 2024-06-13 PROCEDURE — 80053 COMPREHEN METABOLIC PANEL: CPT

## 2024-06-13 PROCEDURE — 85025 COMPLETE CBC W/AUTO DIFF WBC: CPT

## 2024-06-13 RX ORDER — SODIUM CHLORIDE 0.9 % (FLUSH) 0.9 %
10 SYRINGE (ML) INJECTION AS NEEDED
Status: DISCONTINUED | OUTPATIENT
Start: 2024-06-13 | End: 2024-06-13 | Stop reason: HOSPADM

## 2024-06-13 NOTE — DISCHARGE INSTRUCTIONS
Do not put anything in the ear such as Q-tips.  Follow-up with your primary care for further symptom management, continue to take your medications as prescribed.    Return with any new or worsening symptoms.

## 2024-06-13 NOTE — ED PROVIDER NOTES
"Subjective   History of Present Illness  39 year old male presents the ED with complaints of feeling like his head is fuzzy, describes it as \"static\" for approximately 3 weeks.  Patient denies any confusion, chest pain, shortness of breath, cough, congestion, headache, trauma.  Also has a history of suicidal attempt in April of this year, history of anxiety and depression.  Currently on sertraline and mirtazapine. patient denies any suicidal thoughts or ideations, hallucinations, homicidal thoughts or ideations.  Also denies any recent changes in his medication    PCP: Fady        Review of Systems   Neurological:  Negative for dizziness and light-headedness.       No past medical history on file.    No Known Allergies    No past surgical history on file.    Family History   Problem Relation Age of Onset    No Known Problems Mother     No Known Problems Father        Social History     Socioeconomic History    Marital status: Single   Tobacco Use    Smoking status: Every Day     Current packs/day: 0.50     Types: Cigarettes    Smokeless tobacco: Never   Vaping Use    Vaping status: Never Used   Substance and Sexual Activity    Alcohol use: Yes     Comment: very little    Drug use: Defer    Sexual activity: Defer           Objective   Physical Exam  Vitals and nursing note reviewed.   Constitutional:       Appearance: Normal appearance.   HENT:      Head: Normocephalic and atraumatic.      Right Ear: Tympanic membrane normal.      Left Ear: Tympanic membrane normal.      Ears:      Comments: There was cerumen in bilateral auditory canals, no impaction of the TM  Cardiovascular:      Rate and Rhythm: Normal rate and regular rhythm.      Pulses: Normal pulses.      Heart sounds: Normal heart sounds. No murmur heard.  Pulmonary:      Effort: Pulmonary effort is normal.      Breath sounds: Normal breath sounds.   Abdominal:      General: Bowel sounds are normal.      Palpations: Abdomen is soft.   Musculoskeletal:   " "      General: Normal range of motion.      Cervical back: No tenderness.   Skin:     General: Skin is warm and dry.   Neurological:      Mental Status: He is alert and oriented to person, place, and time.   Psychiatric:         Behavior: Behavior normal.         Thought Content: Thought content normal.         Judgment: Judgment normal.      Comments: Mildly anxious upon assessment         Procedures           ED Course      /71   Pulse 65   Temp 98 °F (36.7 °C)   Resp 18   Ht 177.8 cm (70\")   Wt 59.2 kg (130 lb 8.2 oz)   SpO2 96%   BMI 18.73 kg/m²   Labs Reviewed   COMPREHENSIVE METABOLIC PANEL - Abnormal; Notable for the following components:       Result Value    CO2 30.2 (*)     All other components within normal limits    Narrative:     GFR Normal >60  Chronic Kidney Disease <60  Kidney Failure <15     CBC WITH AUTO DIFFERENTIAL - Normal   CBC AND DIFFERENTIAL    Narrative:     The following orders were created for panel order CBC & Differential.  Procedure                               Abnormality         Status                     ---------                               -----------         ------                     CBC Auto Differential[[id number]]        Normal              Final result                 Please view results for these tests on the individual orders.     Medications   sodium chloride 0.9 % flush 10 mL (has no administration in time range)     No radiology results for the last day                                         Medical Decision Making  Patient seen for the above complaint, patient nontoxic-appearing on exam.  CT of the head was considered but felt to to be emergently warranted as patient denies any headache, changes in vision, trauma.  IV was established and blood work was obtained to assess for electrolyte abnormalities or infection.  White blood cell count 7.68, hemoglobin 14.9, electrolytes within normal limits besides mildly elevated CO2.  Upon chart review, patient was " seen in April for a suicide attempt.  On assessment patient states he feels significantly better with his mental health and feels like he is managing his depression and anxiety well.  Recently saw his primary care provider on 6/5/2024 where he was continued on his mirtazapine and sertraline.  Discussed lab work with patient and need for follow-up with primary care.  Also removed earwax from patient's ears, patient states he feels better.  Patient verbalized understanding of discharge education and was agreeable with disposition.    I discussed findings with patient who voices understanding of discharge instructions, signs and symptoms requiring return to ED; discharged improved and in stable condition with follow up for re-evaluation.  This document is intended for medical expert use only. Reading of this document by patients and/or patient's family without participating medical staff guidance may result in misinterpretation and unintended morbidity.  Any interpretation of such data is the responsibility of the patient and/or family member responsible for the patient in concert with their primary or specialist providers, not to be left for sources of online searches such as [employer], [employer] or similar queries. Relying on these approaches to knowledge may result in misinterpretation, misguided goals of care and even death should patients or family members try recommendations outside of the realm of professional medical care in a supervised inpatient environment.       Amount and/or Complexity of Data Reviewed  Labs: ordered. Decision-making details documented in ED Course.    Risk  Prescription drug management.        Final diagnoses:   Anxiety about health   Cerumen in auditory canal on examination       ED Disposition  ED Disposition       ED Disposition   Discharge    Condition   Stable    Comment   --               Keri Shrestha, APRN  [address] [facility] [place] [address] [phone]    Schedule an  appointment as soon as possible for a visit            Medication List      No changes were made to your prescriptions during this visit.            Pepe Barron, APRN  06/13/24 0890

## 2024-10-11 ENCOUNTER — HOSPITAL ENCOUNTER (EMERGENCY)
Facility: HOSPITAL | Age: 39
Discharge: HOME | End: 2024-10-11
Attending: EMERGENCY MEDICINE
Payer: COMMERCIAL

## 2024-10-11 VITALS
RESPIRATION RATE: 17 BRPM | BODY MASS INDEX: 20.86 KG/M2 | WEIGHT: 145.72 LBS | SYSTOLIC BLOOD PRESSURE: 124 MMHG | TEMPERATURE: 97.9 F | OXYGEN SATURATION: 100 % | DIASTOLIC BLOOD PRESSURE: 71 MMHG | HEIGHT: 70 IN | HEART RATE: 94 BPM

## 2024-10-11 DIAGNOSIS — F41.9 ANXIETY: Primary | ICD-10-CM

## 2024-10-11 LAB
ANION GAP SERPL CALCULATED.3IONS-SCNC: 10.4 MMOL/L (ref 5–15)
BASOPHILS # BLD AUTO: 0.02 10*3/MM3 (ref 0–0.2)
BASOPHILS NFR BLD AUTO: 0.3 % (ref 0–1.5)
BUN SERPL-MCNC: 18 MG/DL (ref 6–20)
BUN/CREAT SERPL: 13.4 (ref 7–25)
CALCIUM SPEC-SCNC: 9.9 MG/DL (ref 8.6–10.5)
CHLORIDE SERPL-SCNC: 103 MMOL/L (ref 98–107)
CO2 SERPL-SCNC: 30.6 MMOL/L (ref 22–29)
CREAT SERPL-MCNC: 1.34 MG/DL (ref 0.76–1.27)
DEPRECATED RDW RBC AUTO: 40.8 FL (ref 37–54)
EGFRCR SERPLBLD CKD-EPI 2021: 68.7 ML/MIN/1.73
EOSINOPHIL # BLD AUTO: 0.05 10*3/MM3 (ref 0–0.4)
EOSINOPHIL NFR BLD AUTO: 0.7 % (ref 0.3–6.2)
ERYTHROCYTE [DISTWIDTH] IN BLOOD BY AUTOMATED COUNT: 12.4 % (ref 12.3–15.4)
GLUCOSE SERPL-MCNC: 82 MG/DL (ref 65–99)
HCT VFR BLD AUTO: 41.9 % (ref 37.5–51)
HGB BLD-MCNC: 14.6 G/DL (ref 13–17.7)
IMM GRANULOCYTES # BLD AUTO: 0.02 10*3/MM3 (ref 0–0.05)
IMM GRANULOCYTES NFR BLD AUTO: 0.3 % (ref 0–0.5)
LYMPHOCYTES # BLD AUTO: 2.46 10*3/MM3 (ref 0.7–3.1)
LYMPHOCYTES NFR BLD AUTO: 33.6 % (ref 19.6–45.3)
MCH RBC QN AUTO: 30.9 PG (ref 26.6–33)
MCHC RBC AUTO-ENTMCNC: 34.8 G/DL (ref 31.5–35.7)
MCV RBC AUTO: 88.6 FL (ref 79–97)
MONOCYTES # BLD AUTO: 0.5 10*3/MM3 (ref 0.1–0.9)
MONOCYTES NFR BLD AUTO: 6.8 % (ref 5–12)
NEUTROPHILS NFR BLD AUTO: 4.27 10*3/MM3 (ref 1.7–7)
NEUTROPHILS NFR BLD AUTO: 58.3 % (ref 42.7–76)
NRBC BLD AUTO-RTO: 0 /100 WBC (ref 0–0.2)
PLATELET # BLD AUTO: 217 10*3/MM3 (ref 140–450)
PMV BLD AUTO: 10.4 FL (ref 6–12)
POTASSIUM SERPL-SCNC: 3.7 MMOL/L (ref 3.5–5.2)
RBC # BLD AUTO: 4.73 10*6/MM3 (ref 4.14–5.8)
SODIUM SERPL-SCNC: 144 MMOL/L (ref 136–145)
WBC NRBC COR # BLD AUTO: 7.32 10*3/MM3 (ref 3.4–10.8)

## 2024-10-11 PROCEDURE — 99283 EMERGENCY DEPT VISIT LOW MDM: CPT

## 2024-10-11 PROCEDURE — 80048 BASIC METABOLIC PNL TOTAL CA: CPT | Performed by: EMERGENCY MEDICINE

## 2024-10-11 PROCEDURE — 85025 COMPLETE CBC W/AUTO DIFF WBC: CPT | Performed by: EMERGENCY MEDICINE

## 2024-10-11 RX ORDER — SODIUM CHLORIDE 0.9 % (FLUSH) 0.9 %
10 SYRINGE (ML) INJECTION AS NEEDED
Status: DISCONTINUED | OUTPATIENT
Start: 2024-10-11 | End: 2024-10-11 | Stop reason: HOSPADM

## 2024-10-11 RX ORDER — HYDROXYZINE HYDROCHLORIDE 25 MG/1
25 TABLET, FILM COATED ORAL EVERY 6 HOURS PRN
Qty: 20 TABLET | Refills: 0 | Status: SHIPPED | OUTPATIENT
Start: 2024-10-11

## 2024-10-11 NOTE — ED PROVIDER NOTES
"Subjective   History of Present Illness  Chief complaint: Chills    39-year-old male presents with vague complaints of just not feeling well.  He states he has had chills.  He states symptoms have been present for about the past month.  He states he feels restless.  He denies fever, cough, congestion, vomiting, diarrhea.  He denies any complaints of pain.  He states the left side of his brain does not feel like the right side of his brain.  He denies headache.  He states it is hard to explain.  He denies any numbness, weakness, tingling.    History provided by:  Patient      Review of Systems   Constitutional:  Positive for chills. Negative for fever.   HENT:  Negative for congestion.    Respiratory:  Negative for cough and shortness of breath.    Cardiovascular:  Negative for chest pain.   Gastrointestinal:  Negative for abdominal pain and vomiting.   Neurological:  Negative for headaches.       No past medical history on file.    No Known Allergies    No past surgical history on file.    Family History   Problem Relation Age of Onset    No Known Problems Mother     No Known Problems Father        Social History     Socioeconomic History    Marital status: Single   Tobacco Use    Smoking status: Every Day     Current packs/day: 0.50     Types: Cigarettes    Smokeless tobacco: Never   Vaping Use    Vaping status: Never Used   Substance and Sexual Activity    Alcohol use: Yes     Comment: very little    Drug use: Defer    Sexual activity: Defer       /71   Pulse 94   Temp 97.9 °F (36.6 °C)   Resp 16   Ht 177.8 cm (70\")   Wt 66.1 kg (145 lb 11.6 oz)   SpO2 100%   BMI 20.91 kg/m²       Objective   Physical Exam  Vitals and nursing note reviewed.   Constitutional:       Appearance: Normal appearance.   HENT:      Head: Normocephalic and atraumatic.      Mouth/Throat:      Mouth: Mucous membranes are moist.   Cardiovascular:      Rate and Rhythm: Normal rate and regular rhythm.      Heart sounds: Normal " heart sounds.   Pulmonary:      Effort: Pulmonary effort is normal. No respiratory distress.      Breath sounds: Normal breath sounds.   Abdominal:      Palpations: Abdomen is soft.      Tenderness: There is no abdominal tenderness.   Skin:     General: Skin is warm and dry.   Neurological:      General: No focal deficit present.      Mental Status: He is alert and oriented to person, place, and time.         Procedures           ED Course      Results for orders placed or performed during the hospital encounter of 10/11/24   Basic Metabolic Panel    Specimen: Blood   Result Value Ref Range    Glucose 82 65 - 99 mg/dL    BUN 18 6 - 20 mg/dL    Creatinine 1.34 (H) 0.76 - 1.27 mg/dL    Sodium 144 136 - 145 mmol/L    Potassium 3.7 3.5 - 5.2 mmol/L    Chloride 103 98 - 107 mmol/L    CO2 30.6 (H) 22.0 - 29.0 mmol/L    Calcium 9.9 8.6 - 10.5 mg/dL    BUN/Creatinine Ratio 13.4 7.0 - 25.0    Anion Gap 10.4 5.0 - 15.0 mmol/L    eGFR 68.7 >60.0 mL/min/1.73   CBC Auto Differential    Specimen: Blood   Result Value Ref Range    WBC 7.32 3.40 - 10.80 10*3/mm3    RBC 4.73 4.14 - 5.80 10*6/mm3    Hemoglobin 14.6 13.0 - 17.7 g/dL    Hematocrit 41.9 37.5 - 51.0 %    MCV 88.6 79.0 - 97.0 fL    MCH 30.9 26.6 - 33.0 pg    MCHC 34.8 31.5 - 35.7 g/dL    RDW 12.4 12.3 - 15.4 %    RDW-SD 40.8 37.0 - 54.0 fl    MPV 10.4 6.0 - 12.0 fL    Platelets 217 140 - 450 10*3/mm3    Neutrophil % 58.3 42.7 - 76.0 %    Lymphocyte % 33.6 19.6 - 45.3 %    Monocyte % 6.8 5.0 - 12.0 %    Eosinophil % 0.7 0.3 - 6.2 %    Basophil % 0.3 0.0 - 1.5 %    Immature Grans % 0.3 0.0 - 0.5 %    Neutrophils, Absolute 4.27 1.70 - 7.00 10*3/mm3    Lymphocytes, Absolute 2.46 0.70 - 3.10 10*3/mm3    Monocytes, Absolute 0.50 0.10 - 0.90 10*3/mm3    Eosinophils, Absolute 0.05 0.00 - 0.40 10*3/mm3    Basophils, Absolute 0.02 0.00 - 0.20 10*3/mm3    Immature Grans, Absolute 0.02 0.00 - 0.05 10*3/mm3    nRBC 0.0 0.0 - 0.2 /100 WBC                                             Medical Decision Making  Amount and/or Complexity of Data Reviewed  Labs: ordered.    Risk  Prescription drug management.      Patient had the above evaluation.  Results were discussed with patient.  White blood cell count is normal.  BMP significant for borderline elevation of creatinine at 1.34.  Electrolytes are unremarkable.  Patient remains well-appearing the emergency room.  On further discussion with the patient he states he has been drinking a lot of caffeine.  We discussed that this could be contributing to his symptoms.  He will try to cut back on this.  He will also be given a prescription for hydroxyzine for his anxiety.  He is to follow-up with his primary doctor.      Final diagnoses:   Anxiety       ED Disposition  ED Disposition       ED Disposition   Discharge    Condition   Stable    Comment   --               Keri Shrestha, DHARMESH  [address] [facility] [place] [address] [phone]    Call in 2 days           Medication List        New Prescriptions      hydrOXYzine 25 MG tablet  Commonly known as: ATARAX  Take 1 tablet by mouth Every 6 (Six) Hours As Needed for Anxiety.               Where to Get Your Medications        These medications were sent to [facility]/pharmacy #[id number] - [place], [place] - [address] [facility] [phone] [place] [phone]   [address] [facility] [place] [address]      Phone: [phone]   hydrOXYzine 25 MG tablet            William Beltran MD  10/11/24 0047

## 2024-10-11 NOTE — DISCHARGE INSTRUCTIONS
Follow-up with your primary doctor.  Return to the emergency room for any new or worsening symptoms or if you have any other questions or concerns.  Take medication as prescribed.  Drink plenty of fluids.  Try to limit caffeine intake.

## 2024-12-26 ENCOUNTER — NURSE TRIAGE (OUTPATIENT)
Dept: CALL CENTER | Facility: HOSPITAL | Age: 39
End: 2024-12-26
Payer: COMMERCIAL

## 2024-12-26 NOTE — TELEPHONE ENCOUNTER
"Hub- he did missed a patient office visit. He thinks that  might have something wrong brain. Nothing wrong now. He declined ER. Because he was no show the client was dismissed He can get into another office - Will call the Hub-.     Called the HUB- Sami Rosario- Helping him get an office visit. He would be a new patient.   Reason for Disposition   Caller requesting an appointment, triage offered and declined    Additional Information   Negative: Lab calling with strep throat test results and triager can call in prescription   Negative: Lab calling with urinalysis test results and triager can call in prescription   Negative: Medication questions   Negative: Medication renewal and refill questions   Negative: Pre-operative or pre-procedural questions   Negative: ED call to PCP (i.e., primary care provider; doctor, NP, or PA)   Negative: Doctor (or NP/PA) call to PCP   Negative: Call about patient who is currently hospitalized   Negative: Lab or radiology calling with CRITICAL test results   Negative: [1] Follow-up call from patient regarding patient's clinical status AND [2] information urgent   Negative: [1] Caller requests to speak ONLY to PCP AND [2] URGENT question   Negative: [1] Caller requests to speak to PCP now AND [2] won't tell us reason for call  (Exception: If 10 pm to 6 am, caller must first discuss reason for the call.)   Negative: Notification of hospital admission   Negative: Notification of death   Negative: Caller requesting lab results  (Exception: Routine or non-urgent lab result.)   Negative: Lab or radiology calling with test results   Negative: [1] Follow-up call from patient regarding patient's clinical status AND [2] information NON-URGENT   Negative: [1] Caller requests to speak ONLY to PCP AND [2] NON-URGENT question    Answer Assessment - Initial Assessment Questions  1. REASON FOR CALL or QUESTION: \"What is your reason for calling today?\" or \"How can I best  help you?\" or \"What question do " "you have that I can help answer?\"      Office visit   2. CALLER: Document the source of call. (e.g., laboratory, patient).      New patient,    Protocols used: PCP Call - No Triage-ADULT-    "

## 2025-01-13 ENCOUNTER — OFFICE VISIT (OUTPATIENT)
Dept: FAMILY MEDICINE CLINIC | Facility: CLINIC | Age: 40
End: 2025-01-13
Payer: COMMERCIAL

## 2025-01-13 VITALS
BODY MASS INDEX: 21.64 KG/M2 | SYSTOLIC BLOOD PRESSURE: 134 MMHG | HEIGHT: 70 IN | WEIGHT: 151.2 LBS | DIASTOLIC BLOOD PRESSURE: 82 MMHG | HEART RATE: 79 BPM | OXYGEN SATURATION: 96 %

## 2025-01-13 DIAGNOSIS — E55.9 VITAMIN D DEFICIENCY: ICD-10-CM

## 2025-01-13 DIAGNOSIS — H93.12 TINNITUS, LEFT: ICD-10-CM

## 2025-01-13 DIAGNOSIS — F33.2 SEVERE EPISODE OF RECURRENT MAJOR DEPRESSIVE DISORDER, WITHOUT PSYCHOTIC FEATURES: Primary | ICD-10-CM

## 2025-01-13 PROBLEM — T50.901A OVERDOSE: Status: RESOLVED | Noted: 2024-04-17 | Resolved: 2025-01-13

## 2025-01-13 RX ORDER — MIRTAZAPINE 15 MG/1
15 TABLET, ORALLY DISINTEGRATING ORAL NIGHTLY
COMMUNITY

## 2025-01-13 RX ORDER — VITAMIN K2 90 MCG
1 CAPSULE ORAL DAILY
Qty: 90 CAPSULE | Refills: 3 | Status: SHIPPED | OUTPATIENT
Start: 2025-01-13

## 2025-01-13 NOTE — PROGRESS NOTES
"Chief Complaint  Chief Complaint   Patient presents with    [facility] Care    Tinnitus     Since May        Subjective        Keagan Valenzuela is a 39 y.o. male who presents to [facility].  History of Present Illness    Keagan is a 39-year-old male here for multiple concerns.        Depression  Significant depression for years, worse over the last year  Has had multiple suicide attempts over the last year-states in May 2024 drink rubbing alcohol, NyQuil, and several Benadryl in an attempt to end his life.  Also took 6 tablets of Zoloft in August and attempt to harm self.  Follows with [facility] appointment on 1/27.  He states he is scheduled to see a counselor at that time which he states he \"feels good about it\".  Takes Zoloft 50 mg once daily, Remeron 50 mg once daily  Admits that he has suicidal ideation \"sometimes\" but does not have a plan  Does have very limited social [place], lives alone  Does talk to his mother regularly 6-7 times per week  States that he frequently wishes it was 2017 because \"I was doing well\"        Tinnitus  Left-sided tinnitus ever since his suicide attempt in May 2024  States that he had a CT of his head obtained in [place] that was normal  Denies any further workup                Objective   /82   Pulse 79   Ht 177.8 cm (70\")   Wt 68.6 kg (151 lb 3.2 oz)   SpO2 96%   BMI 21.69 kg/m²     Estimated body mass index is 21.69 kg/m² as calculated from the following:    Height as of this encounter: 177.8 cm (70\").    Weight as of this encounter: 68.6 kg (151 lb 3.2 oz).     Physical Exam   GEN: In no acute distress, non toxic appearing  HEENT: EOMI. Mucous membranes moist. Oropharynx without erythema or exudate.  Right TM normal in appearance.  Unable to visualize left TM secondary to cerumen.  CV: Regular rate and rhythm, no murmurs. No extremity edema.   RESP: Lungs clear to auscultation bilaterally.  No " signs of respiratory distress on room air.  SKIN: No rashes  MSK: No joint erythema, deformity, or effusion.   NEURO: Alert and appropriate. CN 2-12 intact grossly.   Psych: Flat affect      PHQ-2 Depression Screening  Little interest or pleasure in doing things? Almost all   Feeling down, depressed, or hopeless? Almost all   PHQ-2 Total Score 6         Result Review :      Labs (12/5/24)  Vitamin D 11.6  Lipid panel total cholesterol 203, HDL 47, , VLDL 24  CMP grossly unremarkable  A1c 4.7%      Assessment and Plan     Diagnoses and all orders for this visit:    1. Severe episode of recurrent major depressive disorder, without psychotic features (Primary)  Unfortunately struggling with severe depression  Follows with psychiatry-next appointment 1/27 in which he also plans to meet with therapist for the first time  Continue Remeron, sertraline  Had lengthy discussion regarding writing out a plan for when he does have suicidal ideation (who to call, ways to separate himself from method for suicide attempt).  Also recommended that he try to find ways to connect more with other people such as volunteering, finding a Rastafari      2. Tinnitus, left  Most likely secondary to overdose attempt in May 2024 with Benadryl, NyQuil, rubbing alcohol  Will refer to ENT for further evaluation    -     Ambulatory Referral to ENT (Otolaryngology)    3. Vitamin D deficiency  Vitamin D level checked last month and found to be 11.6  Prescription for supplemental vitamin D sent to pharmacy    Other orders  -     Cholecalciferol (Vitamin D3) 1000 units capsule; Take 1 capsule by mouth Daily.  Dispense: 90 capsule; Refill: 3            Follow Up     Return in about 1 year (around 1/13/2026) for Annual physical.

## 2025-04-14 ENCOUNTER — OFFICE VISIT (OUTPATIENT)
Dept: FAMILY MEDICINE CLINIC | Facility: CLINIC | Age: 40
End: 2025-04-14
Payer: COMMERCIAL

## 2025-04-14 VITALS
BODY MASS INDEX: 20.04 KG/M2 | SYSTOLIC BLOOD PRESSURE: 110 MMHG | HEIGHT: 70 IN | OXYGEN SATURATION: 96 % | HEART RATE: 73 BPM | WEIGHT: 140 LBS | DIASTOLIC BLOOD PRESSURE: 66 MMHG

## 2025-04-14 DIAGNOSIS — H93.19 TINNITUS, UNSPECIFIED LATERALITY: ICD-10-CM

## 2025-04-14 DIAGNOSIS — F33.2 SEVERE EPISODE OF RECURRENT MAJOR DEPRESSIVE DISORDER, WITHOUT PSYCHOTIC FEATURES: Primary | ICD-10-CM

## 2025-04-14 PROCEDURE — 99214 OFFICE O/P EST MOD 30 MIN: CPT | Performed by: STUDENT IN AN ORGANIZED HEALTH CARE EDUCATION/TRAINING PROGRAM

## 2025-04-14 RX ORDER — CICLOPIROX 80 MG/ML
SOLUTION TOPICAL
COMMUNITY
Start: 2025-01-06

## 2025-04-14 NOTE — PROGRESS NOTES
"Chief Complaint  Chief Complaint   Patient presents with    Facial Pain     Twitching. Reports happed the day after taking Zoloft. No more zoloft -Has not happened since.     Memory Loss     Feels like he has to think hard about things. Reports not feeling anything when smoking or drinking.        Subjective        Keagan Valenzuela is a 39 y.o. male who presents to [facility].  History of Present Illness    Keagan is a 39 year old male here for multiple concerns.    A few weeks ago patient had episode of facial twitching under both of his eyes.  He states this lasted a few days before resolving.  He stopped taking his Zoloft and after that the facial twitching resolved.  Has not had any since that time.  He is also prescribed Remeron but states he has been out for \"quite a while\".      In addition to the facial twitching has several other symptoms that he is concerned about.  Since September 2024 states that he has found orgasms less enjoyable.  He also \"does not feel anything\" after smoking/drinking like he used to.  Denies drinking any alcohol for the last month.  When lying down to go to bed has been hearing his alarm clock even though it is not going off.  Additionally, when driving he sometimes hears the seatbelt alarm going off even though his seatbelt is clicked in.  Sometimes at night feels like his head is vibrating.  He follows with [facility] but states he does not have any upcoming appointment.        Objective   /66   Pulse 73   Ht 177.8 cm (70\")   Wt 63.5 kg (140 lb)   SpO2 96%   BMI 20.09 kg/m²     Estimated body mass index is 20.09 kg/m² as calculated from the following:    Height as of this encounter: 177.8 cm (70\").    Weight as of this encounter: 63.5 kg (140 lb).     Physical Exam   GEN: In no acute distress, non toxic appearing  HEENT: EOMI. Mucous membranes moist. Oropharynx without erythema or exudate.  Unable to " visualize bilateral TM secondary to cerumen.  CV: Regular rate and rhythm, no murmurs. No extremity edema.   RESP: Lungs clear to auscultation bilaterally.  No signs of respiratory distress on room air.  SKIN: No rashes  MSK: No joint erythema, deformity, or effusion.   NEURO: Alert and appropriate. CN 2-12 intact grossly.          Result Review :              Assessment and Plan     Diagnoses and all orders for this visit:    1. Severe episode of recurrent major depressive disorder, without psychotic features (Primary)  Chronic, uncontrolled condition  Reports ongoing passive SI but no plan or intent  Unsure if the facial twitching is related to the Zoloft as he had been on it for several months without any side effects.  At this time no further workup for facial twitching recommended given that symptoms have resolved.    Did offer to restart Zoloft as it seemed to help his depression in the past, the patient would like to hold off until he is able to see his psychiatrist at [facility].    He does have a refill for Remeron at his pharmacy that he plans to  later today says.  Encouraged him to restart this.    His constellation of symptoms most likely secondary to uncontrolled depression.  I recommend he follow-up with a psychiatrist as well as establishing with therapist like he had planned to do in the past.  If symptoms persist despite improvement in mood would recommend further workup.      2. Tinnitus  Ever since overdose in May 2024 with Benadryl, NyQuil, rubbing alcohol  Previously referred to ENT but patient never scheduled.  He was given information today with the physician's name and phone number to call.           Follow Up     Return in about 9 months (around 1/14/2026) for Annual physical.

## 2025-04-21 ENCOUNTER — TELEPHONE (OUTPATIENT)
Dept: FAMILY MEDICINE CLINIC | Facility: CLINIC | Age: 40
End: 2025-04-21
Payer: COMMERCIAL

## 2025-04-21 NOTE — TELEPHONE ENCOUNTER
Pt c/o numbness in hands. [facility] recommended ED; PT refused. Offered apptmt. Pt requested referral to neurologist.

## 2025-05-30 ENCOUNTER — HOSPITAL ENCOUNTER (EMERGENCY)
Facility: HOSPITAL | Age: 40
Discharge: HOME | End: 2025-05-30
Attending: EMERGENCY MEDICINE
Payer: COMMERCIAL

## 2025-05-30 VITALS
DIASTOLIC BLOOD PRESSURE: 65 MMHG | OXYGEN SATURATION: 97 % | HEART RATE: 69 BPM | HEIGHT: 70 IN | BODY MASS INDEX: 20.44 KG/M2 | SYSTOLIC BLOOD PRESSURE: 102 MMHG | WEIGHT: 142.8 LBS | RESPIRATION RATE: 20 BRPM | TEMPERATURE: 98.3 F

## 2025-05-30 DIAGNOSIS — R51.9 NONINTRACTABLE HEADACHE, UNSPECIFIED CHRONICITY PATTERN, UNSPECIFIED HEADACHE TYPE: Primary | ICD-10-CM

## 2025-05-30 PROCEDURE — 96372 THER/PROPH/DIAG INJ SC/IM: CPT

## 2025-05-30 PROCEDURE — 99283 EMERGENCY DEPT VISIT LOW MDM: CPT

## 2025-05-30 RX ORDER — SUMATRIPTAN 6 MG/.5ML
6 INJECTION, SOLUTION SUBCUTANEOUS ONCE
Status: COMPLETED | OUTPATIENT
Start: 2025-05-30 | End: 2025-05-30

## 2025-05-30 RX ORDER — DIAZEPAM 5 MG/1
5 TABLET ORAL ONCE
Status: COMPLETED | OUTPATIENT
Start: 2025-05-30 | End: 2025-05-30

## 2025-05-30 RX ADMIN — SUMATRIPTAN 6 MG: 6 INJECTION, SOLUTION SUBCUTANEOUS at 12:40

## 2025-05-30 RX ADMIN — DIAZEPAM 5 MG: 5 TABLET ORAL at 13:18

## 2025-05-30 NOTE — ED PROVIDER NOTES
Subjective   History of Present Illness  Patient is a 40-year-old male with a history of migraine headaches who presents with a headache that began yesterday and has gradually worsened. He describes associated symptoms of indecisiveness, nervousness, and mild paranoia. He reports photophobia, noting that bright lights, such as stop lights, exacerbate his headache and have led him to stop wearing his contacts. He denies fever, infectious symptoms, or recent head trauma. He denies visual changes other than photophobia.  Headache is right parietal.  He has not started any new medications recently, but he has been taking two antidepressants started about a month ago. He has taken Tylenol at home for the headache today, with limited relief. He was seen at an outside Emergency Department five days ago for a similar episode and underwent a CT head at that time, which was unremarkable. He denies suicidal ideation and homicidal ideation.  Patient also seen by primary care 2 days ago had memory testing which did not indicate any confusion.  He has reportedly been seen by multiple physicians including a neurologist I was able to find any neurologic deficits per primary care notes.  Review of Systems  See HPI.  No past medical history on file.    No Known Allergies    No past surgical history on file.    Family History   Problem Relation Age of Onset    No Known Problems Mother     No Known Problems Father        Social History     Socioeconomic History    Marital status: Single   Tobacco Use    Smoking status: Every Day     Current packs/day: 0.50     Average packs/day: 0.5 packs/day for 21.4 years (10.7 ttl pk-yrs)     Types: Cigarettes     Start date: 2004    Smokeless tobacco: Never   Vaping Use    Vaping status: Never Used   Substance and Sexual Activity    Alcohol use: Yes     Comment: very little    Drug use: Defer    Sexual activity: Defer           Objective   Physical Exam  No acute distress. Normocephalic. No scleral  "icterus. Moist oral mucosa. No observable neck masses on external visualization. Examined with light, photophobia noted. No respiratory distress. No tachypnea or increased work of breathing. Normal heart rate. Intact distal pulses. Abdomen soft and nontender without peritoneal signs. Cranial nerves II-XII intact, no pronator drift, normal strength and sensation in all four extremities, normal patellar reflexes. Alert. Normal speech. Patient able to follow commands, normal coordination.  Procedures           ED Course        /65   Pulse 69   Temp 98.3 °F (36.8 °C) (Oral)   Resp 20   Ht 177.8 cm (70\")   Wt 64.8 kg (142 lb 12.8 oz)   SpO2 97%   BMI 20.49 kg/m²   Labs Reviewed - No data to display  Medications   SUMAtriptan (IMITREX) SUBCUTANEOUS injection 6 mg (6 mg Subcutaneous Given 5/30/25 1240)   diazePAM (VALIUM) tablet 5 mg (5 mg Oral Given 5/30/25 1318)     No orders to display                                                    Medical Decision Making  Problems Addressed:  Nonintractable headache, unspecified chronicity pattern, unspecified headache type: complicated acute illness or injury    Risk  Prescription drug management.      On reevaluation patient states he is concerned that he may have serotonin syndrome because he took an extra dose of his depression medication last night.  Takes 20 mg a day.  Patient without any spontaneous or inducible clonus.  Afebrile.  Remains alert and oriented x 4.  When I ask patient if his headache is better he says, \"I do not know it seems to be.\"  No SI or HI.  Nonfocal neuro examination.  Hemodynamically stable.  Will DC  Final diagnoses:   Nonintractable headache, unspecified chronicity pattern, unspecified headache type       ED Disposition  ED Disposition       ED Disposition   Discharge    Condition   Stable    Comment   --               Felix Roper DO  [address] [facility]  [address]  [place] [address]  [phone]    In 2 days         "   Medication List      No changes were made to your prescriptions during this visit.            Andre Ott MD  05/30/25 0548